# Patient Record
Sex: FEMALE | Race: WHITE | Employment: OTHER | URBAN - METROPOLITAN AREA
[De-identification: names, ages, dates, MRNs, and addresses within clinical notes are randomized per-mention and may not be internally consistent; named-entity substitution may affect disease eponyms.]

---

## 2017-02-08 PROBLEM — J45.20 MILD INTERMITTENT ASTHMA WITHOUT COMPLICATION: Status: ACTIVE | Noted: 2017-02-08

## 2017-06-25 PROBLEM — S06.0X1A CONCUSSION WITH LOSS OF CONSCIOUSNESS OF 30 MINUTES OR LESS: Status: ACTIVE | Noted: 2017-06-25

## 2017-06-25 PROBLEM — S09.90XA HEAD INJURY: Status: ACTIVE | Noted: 2017-06-25

## 2017-06-25 PROBLEM — F41.9 ANXIETY: Status: ACTIVE | Noted: 2017-06-25

## 2017-08-25 PROBLEM — B02.9 HERPES ZOSTER WITHOUT COMPLICATION: Status: ACTIVE | Noted: 2017-08-25

## 2017-09-29 PROBLEM — E06.3 HASHIMOTO'S THYROIDITIS: Status: ACTIVE | Noted: 2017-09-29

## 2018-04-30 ENCOUNTER — HOSPITAL ENCOUNTER (OUTPATIENT)
Age: 28
Discharge: HOME | End: 2018-04-30
Payer: COMMERCIAL

## 2018-04-30 LAB
ALBUMIN SERPL-MCNC: 4.4 G/DL (ref 3.5–5.2)
ALP BLD-CCNC: 49 U/L (ref 35–104)
ALT SERPL-CCNC: 21 U/L (ref 0–32)
ANION GAP SERPL CALCULATED.3IONS-SCNC: 14 MMOL/L (ref 7–16)
AST SERPL-CCNC: 22 U/L (ref 0–31)
BASOPHILS ABSOLUTE: 0.07 E9/L (ref 0–0.2)
BASOPHILS RELATIVE PERCENT: 0.9 % (ref 0–2)
BILIRUB SERPL-MCNC: 0.5 MG/DL (ref 0–1.2)
BUN BLDV-MCNC: 13 MG/DL (ref 6–20)
CALCIUM SERPL-MCNC: 9.4 MG/DL (ref 8.6–10.2)
CHLORIDE BLD-SCNC: 103 MMOL/L (ref 98–107)
CHOLESTEROL, TOTAL: 155 MG/DL (ref 0–199)
CO2: 18 MMOL/L (ref 22–29)
CREAT SERPL-MCNC: 0.8 MG/DL (ref 0.5–1)
EOSINOPHILS ABSOLUTE: 0.25 E9/L (ref 0.05–0.5)
EOSINOPHILS RELATIVE PERCENT: 3.1 % (ref 0–6)
GFR AFRICAN AMERICAN: >60
GFR NON-AFRICAN AMERICAN: >60 ML/MIN/1.73
GLUCOSE BLD-MCNC: 92 MG/DL (ref 74–109)
HBA1C MFR BLD: 5.2 % (ref 4.8–5.9)
HCT VFR BLD CALC: 44.2 % (ref 34–48)
HDLC SERPL-MCNC: 51 MG/DL
HEMOGLOBIN: 14.9 G/DL (ref 11.5–15.5)
IMMATURE GRANULOCYTES #: 0.04 E9/L
IMMATURE GRANULOCYTES %: 0.5 % (ref 0–5)
LDL CHOLESTEROL CALCULATED: 69 MG/DL (ref 0–99)
LYMPHOCYTES ABSOLUTE: 2.16 E9/L (ref 1.5–4)
LYMPHOCYTES RELATIVE PERCENT: 26.8 % (ref 20–42)
MCH RBC QN AUTO: 31.6 PG (ref 26–35)
MCHC RBC AUTO-ENTMCNC: 33.7 % (ref 32–34.5)
MCV RBC AUTO: 93.8 FL (ref 80–99.9)
MONOCYTES ABSOLUTE: 0.56 E9/L (ref 0.1–0.95)
MONOCYTES RELATIVE PERCENT: 6.9 % (ref 2–12)
NEUTROPHILS ABSOLUTE: 4.99 E9/L (ref 1.8–7.3)
NEUTROPHILS RELATIVE PERCENT: 61.8 % (ref 43–80)
PDW BLD-RTO: 12.3 FL (ref 11.5–15)
PLATELET # BLD: 307 E9/L (ref 130–450)
PMV BLD AUTO: 10 FL (ref 7–12)
POTASSIUM SERPL-SCNC: 4.2 MMOL/L (ref 3.5–5)
RBC # BLD: 4.71 E12/L (ref 3.5–5.5)
SODIUM BLD-SCNC: 135 MMOL/L (ref 132–146)
TOTAL PROTEIN: 7.7 G/DL (ref 6.4–8.3)
TRIGL SERPL-MCNC: 176 MG/DL (ref 0–149)
TSH SERPL DL<=0.05 MIU/L-ACNC: 2.01 UIU/ML (ref 0.27–4.2)
VITAMIN D 25-HYDROXY: 57 NG/ML (ref 30–100)
VLDLC SERPL CALC-MCNC: 35 MG/DL
WBC # BLD: 8.1 E9/L (ref 4.5–11.5)

## 2018-04-30 PROCEDURE — 80061 LIPID PANEL: CPT

## 2018-04-30 PROCEDURE — 85025 COMPLETE CBC W/AUTO DIFF WBC: CPT

## 2018-04-30 PROCEDURE — 84443 ASSAY THYROID STIM HORMONE: CPT

## 2018-04-30 PROCEDURE — 36415 COLL VENOUS BLD VENIPUNCTURE: CPT

## 2018-04-30 PROCEDURE — 83036 HEMOGLOBIN GLYCOSYLATED A1C: CPT

## 2018-04-30 PROCEDURE — 82306 VITAMIN D 25 HYDROXY: CPT

## 2018-04-30 PROCEDURE — 80053 COMPREHEN METABOLIC PANEL: CPT

## 2018-05-01 ENCOUNTER — OFFICE VISIT (OUTPATIENT)
Dept: FAMILY MEDICINE CLINIC | Age: 28
End: 2018-05-01
Payer: COMMERCIAL

## 2018-05-01 ENCOUNTER — TELEPHONE (OUTPATIENT)
Dept: PHYSICAL MEDICINE AND REHAB | Age: 28
End: 2018-05-01

## 2018-05-01 VITALS
RESPIRATION RATE: 18 BRPM | HEIGHT: 66 IN | WEIGHT: 197.4 LBS | DIASTOLIC BLOOD PRESSURE: 78 MMHG | BODY MASS INDEX: 31.72 KG/M2 | HEART RATE: 77 BPM | SYSTOLIC BLOOD PRESSURE: 118 MMHG | OXYGEN SATURATION: 97 %

## 2018-05-01 DIAGNOSIS — J45.20 MILD INTERMITTENT ASTHMA WITHOUT COMPLICATION: ICD-10-CM

## 2018-05-01 DIAGNOSIS — I87.009 POST-PHLEBITIC SYNDROME: ICD-10-CM

## 2018-05-01 DIAGNOSIS — I63.50 CEREBRAL ARTERY OCCLUSION WITH CEREBRAL INFARCTION (HCC): Chronic | ICD-10-CM

## 2018-05-01 DIAGNOSIS — E78.49 OTHER HYPERLIPIDEMIA: ICD-10-CM

## 2018-05-01 DIAGNOSIS — M54.32 SCIATICA OF LEFT SIDE: ICD-10-CM

## 2018-05-01 DIAGNOSIS — G90.A POTS (POSTURAL ORTHOSTATIC TACHYCARDIA SYNDROME): ICD-10-CM

## 2018-05-01 DIAGNOSIS — D68.2 FACTOR V DEFICIENCY (HCC): ICD-10-CM

## 2018-05-01 DIAGNOSIS — G81.90 HEMIPARESIS, UNSPECIFIED HEMIPARESIS ETIOLOGY, UNSPECIFIED LATERALITY (HCC): Primary | ICD-10-CM

## 2018-05-01 PROCEDURE — G8417 CALC BMI ABV UP PARAM F/U: HCPCS | Performed by: FAMILY MEDICINE

## 2018-05-01 PROCEDURE — 1036F TOBACCO NON-USER: CPT | Performed by: FAMILY MEDICINE

## 2018-05-01 PROCEDURE — G8598 ASA/ANTIPLAT THER USED: HCPCS | Performed by: FAMILY MEDICINE

## 2018-05-01 PROCEDURE — 99214 OFFICE O/P EST MOD 30 MIN: CPT | Performed by: FAMILY MEDICINE

## 2018-05-01 PROCEDURE — G8427 DOCREV CUR MEDS BY ELIG CLIN: HCPCS | Performed by: FAMILY MEDICINE

## 2018-05-01 ASSESSMENT — PATIENT HEALTH QUESTIONNAIRE - PHQ9
1. LITTLE INTEREST OR PLEASURE IN DOING THINGS: 1
SUM OF ALL RESPONSES TO PHQ QUESTIONS 1-9: 2
SUM OF ALL RESPONSES TO PHQ9 QUESTIONS 1 & 2: 2
2. FEELING DOWN, DEPRESSED OR HOPELESS: 1

## 2018-05-01 ASSESSMENT — ENCOUNTER SYMPTOMS
BACK PAIN: 1
SINUS PAIN: 0
SHORTNESS OF BREATH: 0
EYE PAIN: 0
BLOOD IN STOOL: 0
CONSTIPATION: 0
VOMITING: 0
EYE REDNESS: 0
EYES NEGATIVE: 1
STRIDOR: 0
RESPIRATORY NEGATIVE: 1
BLURRED VISION: 0
COUGH: 0
DOUBLE VISION: 0
SPUTUM PRODUCTION: 0
ABDOMINAL PAIN: 0
HEMOPTYSIS: 0
SORE THROAT: 0
DIARRHEA: 0
EYE DISCHARGE: 0
PHOTOPHOBIA: 0
HEARTBURN: 0
WHEEZING: 0
NAUSEA: 0
GASTROINTESTINAL NEGATIVE: 1
ORTHOPNEA: 0

## 2018-05-09 ENCOUNTER — HOSPITAL ENCOUNTER (OUTPATIENT)
Dept: NEUROLOGY | Age: 28
Discharge: HOME | End: 2018-05-09
Payer: MEDICARE

## 2018-05-09 ENCOUNTER — TELEPHONE (OUTPATIENT)
Dept: FAMILY MEDICINE CLINIC | Age: 28
End: 2018-05-09

## 2018-05-09 ENCOUNTER — HOSPITAL ENCOUNTER (OUTPATIENT)
Dept: MRI IMAGING | Age: 28
Discharge: HOME | End: 2018-05-11
Payer: MEDICARE

## 2018-05-09 DIAGNOSIS — M54.32 SCIATICA OF LEFT SIDE: ICD-10-CM

## 2018-05-09 PROCEDURE — 95911 NRV CNDJ TEST 9-10 STUDIES: CPT | Performed by: PHYSICAL MEDICINE & REHABILITATION

## 2018-05-09 PROCEDURE — 72148 MRI LUMBAR SPINE W/O DYE: CPT

## 2018-05-09 PROCEDURE — 95870 NDL EMG LMTD STD MUSC 1 XTR: CPT

## 2018-05-09 PROCEDURE — 95886 MUSC TEST DONE W/N TEST COMP: CPT

## 2018-05-09 PROCEDURE — 95911 NRV CNDJ TEST 9-10 STUDIES: CPT

## 2018-05-09 PROCEDURE — 95886 MUSC TEST DONE W/N TEST COMP: CPT | Performed by: PHYSICAL MEDICINE & REHABILITATION

## 2018-05-16 ENCOUNTER — OFFICE VISIT (OUTPATIENT)
Dept: FAMILY MEDICINE CLINIC | Age: 28
End: 2018-05-16
Payer: MEDICARE

## 2018-05-16 VITALS
OXYGEN SATURATION: 98 % | TEMPERATURE: 98.5 F | WEIGHT: 196 LBS | DIASTOLIC BLOOD PRESSURE: 80 MMHG | HEIGHT: 66 IN | RESPIRATION RATE: 16 BRPM | HEART RATE: 66 BPM | SYSTOLIC BLOOD PRESSURE: 102 MMHG | BODY MASS INDEX: 31.5 KG/M2

## 2018-05-16 DIAGNOSIS — I63.50 CEREBRAL ARTERY OCCLUSION WITH CEREBRAL INFARCTION (HCC): Chronic | ICD-10-CM

## 2018-05-16 DIAGNOSIS — M54.16 LUMBAR RADICULOPATHY: Primary | ICD-10-CM

## 2018-05-16 DIAGNOSIS — G90.A POTS (POSTURAL ORTHOSTATIC TACHYCARDIA SYNDROME): ICD-10-CM

## 2018-05-16 DIAGNOSIS — D68.2 FACTOR V DEFICIENCY (HCC): ICD-10-CM

## 2018-05-16 DIAGNOSIS — M51.36 DDD (DEGENERATIVE DISC DISEASE), LUMBAR: ICD-10-CM

## 2018-05-16 PROCEDURE — G8417 CALC BMI ABV UP PARAM F/U: HCPCS | Performed by: FAMILY MEDICINE

## 2018-05-16 PROCEDURE — G8598 ASA/ANTIPLAT THER USED: HCPCS | Performed by: FAMILY MEDICINE

## 2018-05-16 PROCEDURE — 99214 OFFICE O/P EST MOD 30 MIN: CPT | Performed by: FAMILY MEDICINE

## 2018-05-16 PROCEDURE — 1036F TOBACCO NON-USER: CPT | Performed by: FAMILY MEDICINE

## 2018-05-16 PROCEDURE — G8427 DOCREV CUR MEDS BY ELIG CLIN: HCPCS | Performed by: FAMILY MEDICINE

## 2018-05-16 ASSESSMENT — ENCOUNTER SYMPTOMS
SORE THROAT: 0
DIARRHEA: 0
RESPIRATORY NEGATIVE: 1
EYE DISCHARGE: 0
ORTHOPNEA: 0
CONSTIPATION: 0
EYE REDNESS: 0
SHORTNESS OF BREATH: 0
EYE PAIN: 0
PHOTOPHOBIA: 0
SPUTUM PRODUCTION: 0
VOMITING: 0
GASTROINTESTINAL NEGATIVE: 1
COUGH: 0
BACK PAIN: 1
BLOOD IN STOOL: 0
DOUBLE VISION: 0
WHEEZING: 0
HEMOPTYSIS: 0
BLURRED VISION: 0
EYES NEGATIVE: 1
STRIDOR: 0
SINUS PAIN: 0
HEARTBURN: 0
ABDOMINAL PAIN: 0
NAUSEA: 0

## 2018-05-18 ASSESSMENT — ENCOUNTER SYMPTOMS: BOWEL INCONTINENCE: 0

## 2018-05-24 ENCOUNTER — OFFICE VISIT (OUTPATIENT)
Dept: PHYSICAL MEDICINE AND REHAB | Age: 28
End: 2018-05-24
Payer: MEDICARE

## 2018-05-24 VITALS
HEART RATE: 82 BPM | WEIGHT: 190 LBS | DIASTOLIC BLOOD PRESSURE: 57 MMHG | HEIGHT: 66 IN | SYSTOLIC BLOOD PRESSURE: 101 MMHG | BODY MASS INDEX: 30.53 KG/M2

## 2018-05-24 DIAGNOSIS — R20.0 NUMBNESS AND TINGLING: ICD-10-CM

## 2018-05-24 DIAGNOSIS — R20.2 NUMBNESS AND TINGLING: ICD-10-CM

## 2018-05-24 DIAGNOSIS — I63.50 CEREBRAL ARTERY OCCLUSION WITH CEREBRAL INFARCTION (HCC): Chronic | ICD-10-CM

## 2018-05-24 DIAGNOSIS — G81.94 LEFT HEMIPARESIS (HCC): ICD-10-CM

## 2018-05-24 DIAGNOSIS — M21.372 LEFT FOOT DROP: ICD-10-CM

## 2018-05-24 DIAGNOSIS — M54.16 LUMBAR RADICULITIS: Primary | ICD-10-CM

## 2018-05-24 PROCEDURE — G8417 CALC BMI ABV UP PARAM F/U: HCPCS | Performed by: PHYSICAL MEDICINE & REHABILITATION

## 2018-05-24 PROCEDURE — 1036F TOBACCO NON-USER: CPT | Performed by: PHYSICAL MEDICINE & REHABILITATION

## 2018-05-24 PROCEDURE — 99204 OFFICE O/P NEW MOD 45 MIN: CPT | Performed by: PHYSICAL MEDICINE & REHABILITATION

## 2018-05-24 PROCEDURE — G8598 ASA/ANTIPLAT THER USED: HCPCS | Performed by: PHYSICAL MEDICINE & REHABILITATION

## 2018-05-24 PROCEDURE — G8427 DOCREV CUR MEDS BY ELIG CLIN: HCPCS | Performed by: PHYSICAL MEDICINE & REHABILITATION

## 2018-05-28 ENCOUNTER — TELEPHONE (OUTPATIENT)
Dept: NEUROSURGERY | Age: 28
End: 2018-05-28

## 2018-06-26 ENCOUNTER — APPOINTMENT (OUTPATIENT)
Dept: CT IMAGING | Age: 28
End: 2018-06-26
Payer: MEDICARE

## 2018-06-26 ENCOUNTER — HOSPITAL ENCOUNTER (EMERGENCY)
Age: 28
Discharge: HOME | End: 2018-06-27
Attending: EMERGENCY MEDICINE
Payer: MEDICARE

## 2018-06-26 ENCOUNTER — APPOINTMENT (OUTPATIENT)
Dept: GENERAL RADIOLOGY | Age: 28
End: 2018-06-26
Payer: MEDICARE

## 2018-06-26 VITALS
WEIGHT: 185 LBS | RESPIRATION RATE: 18 BRPM | TEMPERATURE: 98.6 F | BODY MASS INDEX: 29.86 KG/M2 | SYSTOLIC BLOOD PRESSURE: 109 MMHG | HEART RATE: 68 BPM | OXYGEN SATURATION: 98 % | DIASTOLIC BLOOD PRESSURE: 65 MMHG

## 2018-06-26 DIAGNOSIS — R10.84 GENERALIZED ABDOMINAL PAIN: Primary | ICD-10-CM
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
## 2018-06-26 LAB
ALBUMIN SERPL-MCNC: 4.1 G/DL (ref 3.5–5.2)
ALP BLD-CCNC: 54 U/L (ref 35–104)
ALT SERPL-CCNC: 16 U/L (ref 0–32)
ANION GAP SERPL CALCULATED.3IONS-SCNC: 14 MMOL/L (ref 7–16)
AST SERPL-CCNC: 18 U/L (ref 0–31)
BACTERIA: ABNORMAL /HPF
BASOPHILS ABSOLUTE: 0.09 E9/L (ref 0–0.2)
BASOPHILS RELATIVE PERCENT: 0.9 % (ref 0–2)
BILIRUB SERPL-MCNC: <0.2 MG/DL (ref 0–1.2)
BILIRUBIN URINE: NEGATIVE
BLOOD, URINE: ABNORMAL
BUN BLDV-MCNC: 12 MG/DL (ref 6–20)
CALCIUM SERPL-MCNC: 9.8 MG/DL (ref 8.6–10.2)
CHLORIDE BLD-SCNC: 105 MMOL/L (ref 98–107)
CHP ED QC CHECK: YES
CLARITY: ABNORMAL
CO2: 21 MMOL/L (ref 22–29)
COLOR: YELLOW
CREAT SERPL-MCNC: 1 MG/DL (ref 0.5–1)
EKG ATRIAL RATE: 57 BPM
EKG P AXIS: 13 DEGREES
EKG P-R INTERVAL: 142 MS
EKG Q-T INTERVAL: 390 MS
EKG QRS DURATION: 62 MS
EKG QTC CALCULATION (BAZETT): 379 MS
EKG R AXIS: 59 DEGREES
EKG T AXIS: 50 DEGREES
EKG VENTRICULAR RATE: 57 BPM
EOSINOPHILS ABSOLUTE: 0.29 E9/L (ref 0.05–0.5)
EOSINOPHILS RELATIVE PERCENT: 3 % (ref 0–6)
EPITHELIAL CELLS, UA: ABNORMAL /HPF
GFR AFRICAN AMERICAN: >60
GFR NON-AFRICAN AMERICAN: >60 ML/MIN/1.73
GLUCOSE BLD-MCNC: 87 MG/DL (ref 74–109)
GLUCOSE URINE: NEGATIVE MG/DL
HCT VFR BLD CALC: 40.1 % (ref 34–48)
HEMOGLOBIN: 13.9 G/DL (ref 11.5–15.5)
IMMATURE GRANULOCYTES #: 0.04 E9/L
IMMATURE GRANULOCYTES %: 0.4 % (ref 0–5)
KETONES, URINE: ABNORMAL MG/DL
LACTIC ACID: 1.3 MMOL/L (ref 0.5–2.2)
LEUKOCYTE ESTERASE, URINE: ABNORMAL
LIPASE: 32 U/L (ref 13–60)
LYMPHOCYTES ABSOLUTE: 2.45 E9/L (ref 1.5–4)
LYMPHOCYTES RELATIVE PERCENT: 25.5 % (ref 20–42)
MCH RBC QN AUTO: 31.9 PG (ref 26–35)
MCHC RBC AUTO-ENTMCNC: 34.7 % (ref 32–34.5)
MCV RBC AUTO: 92 FL (ref 80–99.9)
MONOCYTES ABSOLUTE: 0.96 E9/L (ref 0.1–0.95)
MONOCYTES RELATIVE PERCENT: 10 % (ref 2–12)
NEUTROPHILS ABSOLUTE: 5.79 E9/L (ref 1.8–7.3)
NEUTROPHILS RELATIVE PERCENT: 60.2 % (ref 43–80)
NITRITE, URINE: NEGATIVE
PDW BLD-RTO: 12.2 FL (ref 11.5–15)
PH UA: 6 (ref 5–9)
PLATELET # BLD: 341 E9/L (ref 130–450)
PMV BLD AUTO: 10 FL (ref 7–12)
POTASSIUM SERPL-SCNC: 4.2 MMOL/L (ref 3.5–5)
PREGNANCY TEST URINE, POC: NEGATIVE
PROTEIN UA: NEGATIVE MG/DL
RBC # BLD: 4.36 E12/L (ref 3.5–5.5)
RBC UA: ABNORMAL /HPF (ref 0–2)
SODIUM BLD-SCNC: 140 MMOL/L (ref 132–146)
SPECIFIC GRAVITY UA: 1.02 (ref 1–1.03)
TOTAL PROTEIN: 7.4 G/DL (ref 6.4–8.3)
TROPONIN: <0.01 NG/ML (ref 0–0.03)
UROBILINOGEN, URINE: 0.2 E.U./DL
WBC # BLD: 9.6 E9/L (ref 4.5–11.5)
WBC UA: ABNORMAL /HPF (ref 0–5)

## 2018-06-26 PROCEDURE — 2580000003 HC RX 258: Performed by: EMERGENCY MEDICINE

## 2018-06-26 PROCEDURE — 83605 ASSAY OF LACTIC ACID: CPT

## 2018-06-26 PROCEDURE — 84484 ASSAY OF TROPONIN QUANT: CPT

## 2018-06-26 PROCEDURE — 6360000004 HC RX CONTRAST MEDICATION: Performed by: RADIOLOGY

## 2018-06-26 PROCEDURE — 93005 ELECTROCARDIOGRAM TRACING: CPT | Performed by: EMERGENCY MEDICINE

## 2018-06-26 PROCEDURE — 72170 X-RAY EXAM OF PELVIS: CPT

## 2018-06-26 PROCEDURE — 71260 CT THORAX DX C+: CPT

## 2018-06-26 PROCEDURE — 81001 URINALYSIS AUTO W/SCOPE: CPT

## 2018-06-26 PROCEDURE — 6360000002 HC RX W HCPCS: Performed by: EMERGENCY MEDICINE

## 2018-06-26 PROCEDURE — 96374 THER/PROPH/DIAG INJ IV PUSH: CPT

## 2018-06-26 PROCEDURE — 72100 X-RAY EXAM L-S SPINE 2/3 VWS: CPT

## 2018-06-26 PROCEDURE — 74177 CT ABD & PELVIS W/CONTRAST: CPT

## 2018-06-26 PROCEDURE — 99285 EMERGENCY DEPT VISIT HI MDM: CPT

## 2018-06-26 PROCEDURE — 80053 COMPREHEN METABOLIC PANEL: CPT

## 2018-06-26 PROCEDURE — 72074 X-RAY EXAM THORAC SPINE4/>VW: CPT

## 2018-06-26 PROCEDURE — 83690 ASSAY OF LIPASE: CPT

## 2018-06-26 PROCEDURE — 70450 CT HEAD/BRAIN W/O DYE: CPT

## 2018-06-26 PROCEDURE — 85025 COMPLETE CBC W/AUTO DIFF WBC: CPT

## 2018-06-26 PROCEDURE — 36415 COLL VENOUS BLD VENIPUNCTURE: CPT

## 2018-06-26 PROCEDURE — 94760 N-INVAS EAR/PLS OXIMETRY 1: CPT

## 2018-06-26 RX ORDER — KETOROLAC TROMETHAMINE 30 MG/ML
30 INJECTION, SOLUTION INTRAMUSCULAR; INTRAVENOUS ONCE
Status: DISCONTINUED | OUTPATIENT
Start: 2018-06-27 | End: 2018-06-27

## 2018-06-26 RX ORDER — ONDANSETRON 2 MG/ML
4 INJECTION INTRAMUSCULAR; INTRAVENOUS ONCE
Status: COMPLETED | OUTPATIENT
Start: 2018-06-26 | End: 2018-06-26

## 2018-06-26 RX ORDER — IBUPROFEN 800 MG/1
800 TABLET ORAL EVERY 8 HOURS PRN
Qty: 21 TABLET | Refills: 0 | Status: SHIPPED | OUTPATIENT
Start: 2018-06-26 | End: 2018-06-27 | Stop reason: ALTCHOICE

## 2018-06-26 RX ORDER — SODIUM CHLORIDE 9 MG/ML
1000 INJECTION, SOLUTION INTRAVENOUS ONCE
Status: COMPLETED | OUTPATIENT
Start: 2018-06-26 | End: 2018-06-27

## 2018-06-26 RX ADMIN — IOPAMIDOL 80 ML: 755 INJECTION, SOLUTION INTRAVENOUS at 23:14

## 2018-06-26 RX ADMIN — ONDANSETRON 4 MG: 2 INJECTION INTRAMUSCULAR; INTRAVENOUS at 21:43

## 2018-06-26 RX ADMIN — SODIUM CHLORIDE 1000 ML: 9 INJECTION, SOLUTION INTRAVENOUS at 21:43

## 2018-06-26 ASSESSMENT — ENCOUNTER SYMPTOMS
ABDOMINAL PAIN: 1
EYE PAIN: 0
SINUS PRESSURE: 0
BACK PAIN: 1
NAUSEA: 0
EYE REDNESS: 0
VOMITING: 0
DIARRHEA: 0
EYE DISCHARGE: 0
SHORTNESS OF BREATH: 0
COUGH: 0
SORE THROAT: 0
WHEEZING: 0
ABDOMINAL DISTENTION: 0

## 2018-06-26 ASSESSMENT — PAIN DESCRIPTION - PAIN TYPE: TYPE: ACUTE PAIN

## 2018-06-26 ASSESSMENT — PAIN DESCRIPTION - LOCATION: LOCATION: HIP;HEAD

## 2018-06-26 ASSESSMENT — PAIN SCALES - GENERAL: PAINLEVEL_OUTOF10: 7

## 2018-06-27 NOTE — ED PROVIDER NOTES
0.00 - 0.03 ng/mL   Microscopic Urinalysis   Result Value Ref Range    WBC, UA 5-10 0 - 5 /HPF    RBC, UA 1-3 0 - 2 /HPF    Epi Cells MANY /HPF    Bacteria, UA MODERATE (A) /HPF   POC Pregnancy Urine Qual   Result Value Ref Range    Preg Test, Ur negative     QC OK? yes    EKG 12 Lead   Result Value Ref Range    Ventricular Rate 57 BPM    Atrial Rate 57 BPM    P-R Interval 142 ms    QRS Duration 62 ms    Q-T Interval 390 ms    QTc Calculation (Bazett) 379 ms    P Axis 13 degrees    R Axis 59 degrees    T Axis 50 degrees     EKG:  Sinus bradycardia with ventricular rate of 57. SD interval, QRS duration and QT interval within normal range. Normal axis. Nonspecific T wave changes. No previous EKG. Radiology:  CT Chest W Contrast   Final Result         CHEST: No acute injury. Abdomen and pelvis: No acute injury            CT ABDOMEN PELVIS W IV CONTRAST Additional Contrast? None   Final Result         CHEST: No acute injury. Abdomen and pelvis: No acute injury            CT Head WO Contrast   Final Result   No CT evidence of acute intracranial injury. XR LUMBAR SPINE (2-3 VIEWS)   Final Result   No focal abnormality. XR PELVIS (1-2 VIEWS)   Final Result   No focal abnormality. XR THORACIC SPINE (MIN 4 VIEWS)   Final Result   No fracture          ------------------------- NURSING NOTES AND VITALS REVIEWED ---------------------------  Date / Time Roomed:  6/26/2018  7:34 PM  ED Bed Assignment:  [id number]    The nursing notes within the ED encounter and vital signs as below have been reviewed.    /65   Pulse 68   Temp 98.6 °F (37 °C) (Oral)   Resp 18   Wt 185 lb (83.9 kg)   LMP 06/01/2018   SpO2 98%   BMI 29.86 kg/m²   Oxygen Saturation Interpretation: Normal      ------------------------------------------ PROGRESS NOTES ------------------------------------------  I have spoken with the patient and discussed todays results, in addition to providing specific details for

## 2018-07-31 RX ORDER — CLONAZEPAM 1 MG/1
0.25 TABLET ORAL DAILY
COMMUNITY
End: 2021-09-02

## 2018-08-01 NOTE — H&P
[place], [address] [facility]  [address] [facility] [facility] [place]  Phone: [phone]  Fax: [phone]    PCP: Irineo Jean DO  Date of visit: 8/2/2018    CC: low back pain       Taty Solis is a 32 y.o. female who presents today for epidural injection. Patient complains of low back pain and left leg numbness. No red flag symptoms. Consents to proceed with procedure. Allergies   Allergen Reactions    Latex     Latex Dermatitis    Prednisone     Prednisone Other (See Comments)     Becomes aggressive per mother    Prozac [Fluoxetine Hcl] Other (See Comments)     Suicidal thoughts       No current facility-administered medications for this encounter. Past Medical History:   Diagnosis Date    Anxiety     Asthma     Blood clot embolism during pregnancy, antepartum     [facility]erebral [facility] occlusion [facility] infarction ([facility]     DVT (deep vein thrombosis) in pregnancy ([facility] [address] [facility] 11/2015    DVT of leg (deep venous thrombosis) ([facility])     left    DVT of upper extremity (deep vein thrombosis) ([facility])     Left X 2    POTS (postural orthostatic tachycardia syndrome)     TIA (transient ischemic attack)        Past Surgical History:   Procedure Laterality Date    ARM SURGERY Right        Family History   Problem Relation Age of Onset    Deep Vein Thrombosis Maternal Grandmother             ROS:    Constitutional: Denies fevers, chills, night sweats, unintentional weight loss     Skin: Denies rash or skin changes     Respiratory: Denies SOB or cough     Cardiovascular: Denies CP, palpitations, edema      Neurologic: See HPI.     MSK: See HPI. Hematologic/Lymphatic/Immunologic: Denies bruising       Physical Exam:   Blood pressure 105/63, pulse 62, resp. rate 16, SpO2 99 %, not currently breastfeeding.    General: well developed and well nourished in no acute distress  Resp: symmetrical chest expansion, unlabored breathing, respirations unlabored. CV: Heart rate is regular. Peripheral pulses are palpable  Skin: No rashes or ecchymosis. Normal turgor. MSK: Palpatory exam revealed mild tenderness along lumbosacral paraspinals, midline spine.       Neurological Exam:  Strength:                     R          L  Hip Flex                       5          4  Knee Ext                     5          4  Ankle dorsi                  5          1  EHL                             5          3  Ankle Plantar               5          4     Sensory:  Decreased sensation LT diffusely but left L4 and L5 more so     Reflexes:                     R          L  Patellar                        (2+)      (2+)  Ankle Jerk                   (2+)      (2+)    Impression:     Lumbar radiculopathy      Plan:   · Injection as planned today           Lj Fish DO, FAAPMR   Board Certified Physical Medicine and Rehabilitation

## 2018-08-02 ENCOUNTER — HOSPITAL ENCOUNTER (OUTPATIENT)
Dept: OPERATING ROOM | Age: 28
Setting detail: OUTPATIENT SURGERY
Discharge: HOME | End: 2018-08-02
Attending: PHYSICAL MEDICINE & REHABILITATION
Payer: MEDICARE

## 2018-08-02 ENCOUNTER — HOSPITAL ENCOUNTER (OUTPATIENT)
Age: 28
Setting detail: OUTPATIENT SURGERY
Discharge: HOME | End: 2018-08-02
Attending: PHYSICAL MEDICINE & REHABILITATION | Admitting: PHYSICAL MEDICINE & REHABILITATION
Payer: MEDICARE

## 2018-08-02 VITALS
HEART RATE: 59 BPM | DIASTOLIC BLOOD PRESSURE: 62 MMHG | OXYGEN SATURATION: 98 % | RESPIRATION RATE: 16 BRPM | SYSTOLIC BLOOD PRESSURE: 112 MMHG

## 2018-08-02 DIAGNOSIS — M51.36 DDD (DEGENERATIVE DISC DISEASE), LUMBAR: ICD-10-CM

## 2018-08-02 PROCEDURE — 3209999900 FLUORO FOR SURGICAL PROCEDURES

## 2018-08-02 PROCEDURE — 2580000003 HC RX 258: Performed by: PHYSICAL MEDICINE & REHABILITATION

## 2018-08-02 PROCEDURE — 2709999900 HC NON-CHARGEABLE SUPPLY: Performed by: PHYSICAL MEDICINE & REHABILITATION

## 2018-08-02 PROCEDURE — 6360000002 HC RX W HCPCS: Performed by: PHYSICAL MEDICINE & REHABILITATION

## 2018-08-02 PROCEDURE — 7100000010 HC PHASE II RECOVERY - FIRST 15 MIN: Performed by: PHYSICAL MEDICINE & REHABILITATION

## 2018-08-02 PROCEDURE — 64483 NJX AA&/STRD TFRM EPI L/S 1: CPT | Performed by: PHYSICAL MEDICINE & REHABILITATION

## 2018-08-02 PROCEDURE — 2500000003 HC RX 250 WO HCPCS: Performed by: PHYSICAL MEDICINE & REHABILITATION

## 2018-08-02 PROCEDURE — 3600000005 HC SURGERY LEVEL 5 BASE: Performed by: PHYSICAL MEDICINE & REHABILITATION

## 2018-08-02 PROCEDURE — 6360000004 HC RX CONTRAST MEDICATION: Performed by: PHYSICAL MEDICINE & REHABILITATION

## 2018-08-02 PROCEDURE — 7100000011 HC PHASE II RECOVERY - ADDTL 15 MIN: Performed by: PHYSICAL MEDICINE & REHABILITATION

## 2018-08-02 RX ORDER — LIDOCAINE HYDROCHLORIDE 10 MG/ML
INJECTION, SOLUTION EPIDURAL; INFILTRATION; INTRACAUDAL; PERINEURAL PRN
Status: DISCONTINUED | OUTPATIENT
Start: 2018-08-02 | End: 2018-08-02 | Stop reason: HOSPADM

## 2018-08-02 ASSESSMENT — PAIN DESCRIPTION - DESCRIPTORS
DESCRIPTORS: ACHING
DESCRIPTORS: ACHING

## 2018-08-02 ASSESSMENT — PAIN DESCRIPTION - LOCATION
LOCATION: HIP
LOCATION: HIP

## 2018-08-02 ASSESSMENT — PAIN DESCRIPTION - PAIN TYPE
TYPE: SURGICAL PAIN;CHRONIC PAIN
TYPE: SURGICAL PAIN

## 2018-08-02 ASSESSMENT — PAIN DESCRIPTION - ORIENTATION
ORIENTATION: LEFT
ORIENTATION: LEFT

## 2018-08-02 ASSESSMENT — PAIN SCALES - GENERAL
PAINLEVEL_OUTOF10: 6
PAINLEVEL_OUTOF10: 4

## 2018-08-02 ASSESSMENT — PAIN - FUNCTIONAL ASSESSMENT: PAIN_FUNCTIONAL_ASSESSMENT: 0-10

## 2018-08-02 NOTE — LETTER
[facility]  [place] [address]  Phone: [phone]          August 2, 2018     Patient: Ann Stiles   YOB: 1990   Date of Visit: 6/27/2018       To Whom it May Concern:    Ann Stiles was seen at [facility] [facility] on 6/27/2018. Please excuse Brisa Hodge from work today, she was needed for her daughter. If you have any questions or concerns, please don't hesitate to call.     Sincerely,         Norman Mcguire, RN

## 2018-08-02 NOTE — OP NOTE
LUMBOSACRAL EPIDURAL STEROID INJECTION, TRANSFORAMINAL APPROACH       WITH FLUOROSCOPIC GUIDANCE    Patient: Taty Solis                         MRN#: [id number]  : 1990   Date of procedure: 2018      Physician Performing Procedure:  Christina Dobbins DO    Clinical Scenario: As per electronic documentation. Diagnosis: lumbar radiculopathy     Injectate: A total of 3cc, consisting of 1 cc of Dexamethasone 10mg/ml,  with the remainder of normal saline    Levels Treated:  Left L5-S1 neural foramen    Approach:   Transforaminal    Improvement after today's procedure: As per nursing record. Comments:  None     Pre-procedural evaluation: The patient was examined today just prior to performing the procedure listed above. The patient's heart rate was normal, lungs were clear to auscultation bilaterally. Procedure: The patient was prepped and draped in a sterile fashion in the prone position after informed consent was signed and all the patient's questions were answered including the risks, benefits, alternative treatment options, and prognosis. The risks include - but are not limited to - infection, allergic reaction, increased pain, lack of therapeutic benefit, steroid reaction, nerve damage, paralysis, stroke, epidural hematoma, syncope, headache, respiratory or cardiac arrest, and scar formation. The C-arm was positioned so that an oblique view of the neural foramen as noted above was visualized. The soft tissues overlying this structure were infiltrated with 2-3 cc. of 1% Lidocaine without Epinephrine. A 22 gauge 5 inch spinal needle was inserted toward the target using a trajectory view along the fluoroscope beam.  Under AP and lateral visualization, the needle was advanced so it did not puncture dura. Biplanar projections were used to confirm position. Aspiration was confirmed to be negative for CSF and/or blood.   A 1-2 cc. volume of Isovue contrast was injected at this

## 2019-01-21 ENCOUNTER — OFFICE VISIT (OUTPATIENT)
Dept: FAMILY MEDICINE CLINIC | Age: 29
End: 2019-01-21
Payer: MEDICARE

## 2019-01-21 VITALS
SYSTOLIC BLOOD PRESSURE: 120 MMHG | HEIGHT: 66 IN | OXYGEN SATURATION: 96 % | TEMPERATURE: 98.3 F | DIASTOLIC BLOOD PRESSURE: 78 MMHG | BODY MASS INDEX: 39.05 KG/M2 | HEART RATE: 94 BPM | RESPIRATION RATE: 18 BRPM | WEIGHT: 243 LBS

## 2019-01-21 DIAGNOSIS — R73.01 IFG (IMPAIRED FASTING GLUCOSE): ICD-10-CM

## 2019-01-21 DIAGNOSIS — R53.83 FATIGUE, UNSPECIFIED TYPE: ICD-10-CM

## 2019-01-21 DIAGNOSIS — Z23 IMMUNIZATION DUE: ICD-10-CM

## 2019-01-21 DIAGNOSIS — I10 ESSENTIAL HYPERTENSION: ICD-10-CM

## 2019-01-21 DIAGNOSIS — R79.89 LOW VITAMIN D LEVEL: ICD-10-CM

## 2019-01-21 DIAGNOSIS — E78.5 DYSLIPIDEMIA: ICD-10-CM

## 2019-01-21 DIAGNOSIS — I63.50 CEREBRAL ARTERY OCCLUSION WITH CEREBRAL INFARCTION (HCC): Chronic | ICD-10-CM

## 2019-01-21 DIAGNOSIS — D68.2 FACTOR V DEFICIENCY (HCC): ICD-10-CM

## 2019-01-21 DIAGNOSIS — G81.90 HEMIPARESIS, UNSPECIFIED HEMIPARESIS ETIOLOGY, UNSPECIFIED LATERALITY (HCC): Primary | ICD-10-CM

## 2019-01-21 PROCEDURE — G8482 FLU IMMUNIZE ORDER/ADMIN: HCPCS | Performed by: FAMILY MEDICINE

## 2019-01-21 PROCEDURE — G8417 CALC BMI ABV UP PARAM F/U: HCPCS | Performed by: FAMILY MEDICINE

## 2019-01-21 PROCEDURE — 90686 IIV4 VACC NO PRSV 0.5 ML IM: CPT | Performed by: FAMILY MEDICINE

## 2019-01-21 PROCEDURE — G8427 DOCREV CUR MEDS BY ELIG CLIN: HCPCS | Performed by: FAMILY MEDICINE

## 2019-01-21 PROCEDURE — 99214 OFFICE O/P EST MOD 30 MIN: CPT | Performed by: FAMILY MEDICINE

## 2019-01-21 PROCEDURE — 1036F TOBACCO NON-USER: CPT | Performed by: FAMILY MEDICINE

## 2019-01-21 PROCEDURE — G0008 ADMIN INFLUENZA VIRUS VAC: HCPCS | Performed by: FAMILY MEDICINE

## 2019-01-21 PROCEDURE — G8598 ASA/ANTIPLAT THER USED: HCPCS | Performed by: FAMILY MEDICINE

## 2019-01-21 RX ORDER — CHOLECALCIFEROL (VITAMIN D3) 125 MCG
CAPSULE ORAL
Qty: 90 TABLET | Refills: 1 | Status: SHIPPED | OUTPATIENT
Start: 2019-01-21 | End: 2019-04-23 | Stop reason: SDUPTHER

## 2019-01-21 RX ORDER — ZOLPIDEM TARTRATE 5 MG/1
5 TABLET ORAL NIGHTLY PRN
COMMUNITY
End: 2019-10-23 | Stop reason: ALTCHOICE

## 2019-01-21 RX ORDER — FOLIC ACID 1 MG/1
TABLET ORAL
Qty: 90 TABLET | Refills: 1 | Status: SHIPPED | OUTPATIENT
Start: 2019-01-21 | End: 2019-04-23 | Stop reason: SDUPTHER

## 2019-01-21 RX ORDER — METOPROLOL SUCCINATE 25 MG/1
12.5 TABLET, EXTENDED RELEASE ORAL DAILY
Qty: 45 TABLET | Refills: 1 | Status: SHIPPED | OUTPATIENT
Start: 2019-01-21 | End: 2019-04-23 | Stop reason: SDUPTHER

## 2019-01-21 RX ORDER — ATORVASTATIN CALCIUM 10 MG/1
TABLET, FILM COATED ORAL
Qty: 90 TABLET | Refills: 1 | Status: SHIPPED | OUTPATIENT
Start: 2019-01-21 | End: 2019-04-23 | Stop reason: SDUPTHER

## 2019-01-21 ASSESSMENT — ENCOUNTER SYMPTOMS
FACIAL SWELLING: 0
VOICE CHANGE: 0
BLURRED VISION: 0
TROUBLE SWALLOWING: 0
BLOOD IN STOOL: 0
EYE PAIN: 0
ANAL BLEEDING: 0
ABDOMINAL DISTENTION: 0
RECTAL PAIN: 0
EYE ITCHING: 0
EYE REDNESS: 0
SORE THROAT: 0
BACK PAIN: 1
ORTHOPNEA: 0
CONSTIPATION: 0
SINUS PAIN: 0
APNEA: 0
RHINORRHEA: 0
SHORTNESS OF BREATH: 0
PHOTOPHOBIA: 0
WHEEZING: 0
VOMITING: 0
EYE DISCHARGE: 0
COLOR CHANGE: 0
RESPIRATORY NEGATIVE: 1
CHOKING: 0
ABDOMINAL PAIN: 0
STRIDOR: 0
ALLERGIC/IMMUNOLOGIC NEGATIVE: 1
COUGH: 0
GASTROINTESTINAL NEGATIVE: 1
NAUSEA: 0
DIARRHEA: 0
BOWEL INCONTINENCE: 0
SINUS PRESSURE: 0
CHEST TIGHTNESS: 0

## 2019-04-18 ENCOUNTER — HOSPITAL ENCOUNTER (OUTPATIENT)
Age: 29
Discharge: HOME | End: 2019-04-18
Payer: MEDICARE

## 2019-04-18 DIAGNOSIS — E78.5 DYSLIPIDEMIA: ICD-10-CM

## 2019-04-18 DIAGNOSIS — G81.90 HEMIPARESIS, UNSPECIFIED HEMIPARESIS ETIOLOGY, UNSPECIFIED LATERALITY (HCC): ICD-10-CM

## 2019-04-18 DIAGNOSIS — R53.83 FATIGUE, UNSPECIFIED TYPE: ICD-10-CM

## 2019-04-18 DIAGNOSIS — D68.2 FACTOR V DEFICIENCY (HCC): ICD-10-CM

## 2019-04-18 DIAGNOSIS — R73.01 IFG (IMPAIRED FASTING GLUCOSE): ICD-10-CM

## 2019-04-18 LAB
ALBUMIN SERPL-MCNC: 4.3 G/DL (ref 3.5–5.2)
ALP BLD-CCNC: 66 U/L (ref 35–104)
ALT SERPL-CCNC: 17 U/L (ref 0–32)
ANION GAP SERPL CALCULATED.3IONS-SCNC: 11 MMOL/L (ref 7–16)
AST SERPL-CCNC: 19 U/L (ref 0–31)
BASOPHILS ABSOLUTE: 0.05 E9/L (ref 0–0.2)
BASOPHILS RELATIVE PERCENT: 0.8 % (ref 0–2)
BILIRUB SERPL-MCNC: 0.5 MG/DL (ref 0–1.2)
BUN BLDV-MCNC: 10 MG/DL (ref 6–20)
CALCIUM SERPL-MCNC: 9.5 MG/DL (ref 8.6–10.2)
CHLORIDE BLD-SCNC: 107 MMOL/L (ref 98–107)
CHOLESTEROL, TOTAL: 188 MG/DL (ref 0–199)
CO2: 19 MMOL/L (ref 22–29)
CREAT SERPL-MCNC: 0.9 MG/DL (ref 0.5–1)
EOSINOPHILS ABSOLUTE: 0.21 E9/L (ref 0.05–0.5)
EOSINOPHILS RELATIVE PERCENT: 3.5 % (ref 0–6)
GFR AFRICAN AMERICAN: >60
GFR NON-AFRICAN AMERICAN: >60 ML/MIN/1.73
GLUCOSE BLD-MCNC: 102 MG/DL (ref 74–99)
HBA1C MFR BLD: 5.4 % (ref 4–5.6)
HCT VFR BLD CALC: 40.6 % (ref 34–48)
HDLC SERPL-MCNC: 50 MG/DL
HEMOGLOBIN: 13.8 G/DL (ref 11.5–15.5)
IMMATURE GRANULOCYTES #: 0.02 E9/L
IMMATURE GRANULOCYTES %: 0.3 % (ref 0–5)
LDL CHOLESTEROL CALCULATED: 94 MG/DL (ref 0–99)
LYMPHOCYTES ABSOLUTE: 1.92 E9/L (ref 1.5–4)
LYMPHOCYTES RELATIVE PERCENT: 31.7 % (ref 20–42)
MCH RBC QN AUTO: 31.3 PG (ref 26–35)
MCHC RBC AUTO-ENTMCNC: 34 % (ref 32–34.5)
MCV RBC AUTO: 92.1 FL (ref 80–99.9)
MONOCYTES ABSOLUTE: 0.76 E9/L (ref 0.1–0.95)
MONOCYTES RELATIVE PERCENT: 12.6 % (ref 2–12)
NEUTROPHILS ABSOLUTE: 3.09 E9/L (ref 1.8–7.3)
NEUTROPHILS RELATIVE PERCENT: 51.1 % (ref 43–80)
PDW BLD-RTO: 12.1 FL (ref 11.5–15)
PLATELET # BLD: 278 E9/L (ref 130–450)
PMV BLD AUTO: 9.4 FL (ref 7–12)
POTASSIUM SERPL-SCNC: 4 MMOL/L (ref 3.5–5)
RBC # BLD: 4.41 E12/L (ref 3.5–5.5)
SODIUM BLD-SCNC: 137 MMOL/L (ref 132–146)
TOTAL PROTEIN: 7.8 G/DL (ref 6.4–8.3)
TRIGL SERPL-MCNC: 219 MG/DL (ref 0–149)
TSH SERPL DL<=0.05 MIU/L-ACNC: 4.77 UIU/ML (ref 0.27–4.2)
VLDLC SERPL CALC-MCNC: 44 MG/DL
WBC # BLD: 6.1 E9/L (ref 4.5–11.5)

## 2019-04-18 PROCEDURE — 80061 LIPID PANEL: CPT

## 2019-04-18 PROCEDURE — 84443 ASSAY THYROID STIM HORMONE: CPT

## 2019-04-18 PROCEDURE — 36415 COLL VENOUS BLD VENIPUNCTURE: CPT

## 2019-04-18 PROCEDURE — 85025 COMPLETE CBC W/AUTO DIFF WBC: CPT

## 2019-04-18 PROCEDURE — 83036 HEMOGLOBIN GLYCOSYLATED A1C: CPT

## 2019-04-18 PROCEDURE — 80053 COMPREHEN METABOLIC PANEL: CPT

## 2019-04-23 ENCOUNTER — OFFICE VISIT (OUTPATIENT)
Dept: FAMILY MEDICINE CLINIC | Age: 29
End: 2019-04-23
Payer: MEDICARE

## 2019-04-23 VITALS
SYSTOLIC BLOOD PRESSURE: 114 MMHG | RESPIRATION RATE: 18 BRPM | DIASTOLIC BLOOD PRESSURE: 62 MMHG | HEIGHT: 66 IN | HEART RATE: 62 BPM | TEMPERATURE: 97.7 F | BODY MASS INDEX: 38.54 KG/M2 | WEIGHT: 239.8 LBS | OXYGEN SATURATION: 98 %

## 2019-04-23 DIAGNOSIS — R79.89 LOW VITAMIN D LEVEL: ICD-10-CM

## 2019-04-23 DIAGNOSIS — I10 ESSENTIAL HYPERTENSION: ICD-10-CM

## 2019-04-23 DIAGNOSIS — I63.50 CEREBRAL ARTERY OCCLUSION WITH CEREBRAL INFARCTION (HCC): Chronic | ICD-10-CM

## 2019-04-23 DIAGNOSIS — D68.2 FACTOR V DEFICIENCY (HCC): ICD-10-CM

## 2019-04-23 DIAGNOSIS — J45.20 MILD INTERMITTENT ASTHMA WITHOUT COMPLICATION: Primary | ICD-10-CM

## 2019-04-23 DIAGNOSIS — G90.A POTS (POSTURAL ORTHOSTATIC TACHYCARDIA SYNDROME): ICD-10-CM

## 2019-04-23 DIAGNOSIS — E78.49 OTHER HYPERLIPIDEMIA: ICD-10-CM

## 2019-04-23 DIAGNOSIS — E78.5 DYSLIPIDEMIA: ICD-10-CM

## 2019-04-23 DIAGNOSIS — R05.9 COUGH: ICD-10-CM

## 2019-04-23 PROCEDURE — 99214 OFFICE O/P EST MOD 30 MIN: CPT | Performed by: FAMILY MEDICINE

## 2019-04-23 PROCEDURE — G8427 DOCREV CUR MEDS BY ELIG CLIN: HCPCS | Performed by: FAMILY MEDICINE

## 2019-04-23 PROCEDURE — 1036F TOBACCO NON-USER: CPT | Performed by: FAMILY MEDICINE

## 2019-04-23 PROCEDURE — G8417 CALC BMI ABV UP PARAM F/U: HCPCS | Performed by: FAMILY MEDICINE

## 2019-04-23 PROCEDURE — G8598 ASA/ANTIPLAT THER USED: HCPCS | Performed by: FAMILY MEDICINE

## 2019-04-23 PROCEDURE — G8510 SCR DEP NEG, NO PLAN REQD: HCPCS | Performed by: FAMILY MEDICINE

## 2019-04-23 RX ORDER — TOPIRAMATE 100 MG/1
100 TABLET, FILM COATED ORAL 2 TIMES DAILY
Refills: 11 | COMMUNITY
Start: 2019-03-27 | End: 2021-02-05 | Stop reason: SDUPTHER

## 2019-04-23 RX ORDER — METOPROLOL SUCCINATE 25 MG/1
12.5 TABLET, EXTENDED RELEASE ORAL DAILY
Qty: 45 TABLET | Refills: 1 | Status: SHIPPED | OUTPATIENT
Start: 2019-04-23 | End: 2019-10-23 | Stop reason: SDUPTHER

## 2019-04-23 RX ORDER — ALBUTEROL SULFATE 90 UG/1
2 AEROSOL, METERED RESPIRATORY (INHALATION) EVERY 6 HOURS PRN
Qty: 1 INHALER | Refills: 3 | Status: SHIPPED | OUTPATIENT
Start: 2019-04-23 | End: 2019-10-23 | Stop reason: SDUPTHER

## 2019-04-23 RX ORDER — CHOLECALCIFEROL (VITAMIN D3) 125 MCG
CAPSULE ORAL
Qty: 90 TABLET | Refills: 1 | Status: SHIPPED | OUTPATIENT
Start: 2019-04-23 | End: 2019-10-23 | Stop reason: SDUPTHER

## 2019-04-23 RX ORDER — ATORVASTATIN CALCIUM 10 MG/1
TABLET, FILM COATED ORAL
Qty: 90 TABLET | Refills: 1 | Status: SHIPPED | OUTPATIENT
Start: 2019-04-23 | End: 2019-10-23 | Stop reason: SDUPTHER

## 2019-04-23 RX ORDER — FOLIC ACID 1 MG/1
TABLET ORAL
Qty: 90 TABLET | Refills: 1 | Status: SHIPPED | OUTPATIENT
Start: 2019-04-23 | End: 2019-10-23 | Stop reason: SDUPTHER

## 2019-04-23 ASSESSMENT — ENCOUNTER SYMPTOMS
RHINORRHEA: 0
SHORTNESS OF BREATH: 0
ABDOMINAL PAIN: 0
NAUSEA: 0
SORE THROAT: 0
FACIAL SWELLING: 0
COLOR CHANGE: 0
EYE DISCHARGE: 0
BLOOD IN STOOL: 0
EYE PAIN: 0
TROUBLE SWALLOWING: 0
VOMITING: 0
COUGH: 0
BACK PAIN: 1
RECTAL PAIN: 0
APNEA: 0
ORTHOPNEA: 0
BOWEL INCONTINENCE: 0
DIARRHEA: 0
STRIDOR: 0
VOICE CHANGE: 0
ABDOMINAL DISTENTION: 0
CHEST TIGHTNESS: 0
EYE ITCHING: 0
CHOKING: 0
BLURRED VISION: 0
ANAL BLEEDING: 0
PHOTOPHOBIA: 0
RESPIRATORY NEGATIVE: 1
SINUS PRESSURE: 0
EYE REDNESS: 0
SINUS PAIN: 0
GASTROINTESTINAL NEGATIVE: 1
CONSTIPATION: 0
ALLERGIC/IMMUNOLOGIC NEGATIVE: 1
WHEEZING: 0

## 2019-04-23 ASSESSMENT — PATIENT HEALTH QUESTIONNAIRE - PHQ9
1. LITTLE INTEREST OR PLEASURE IN DOING THINGS: 0
SUM OF ALL RESPONSES TO PHQ9 QUESTIONS 1 & 2: 0
SUM OF ALL RESPONSES TO PHQ QUESTIONS 1-9: 0
2. FEELING DOWN, DEPRESSED OR HOPELESS: 0
SUM OF ALL RESPONSES TO PHQ QUESTIONS 1-9: 0

## 2019-04-23 NOTE — PROGRESS NOTES
Ashley Kahtleen is a 29 y.o. female. HPI/Chief C/O:  Chief Complaint   Patient presents with    Discuss Labs     Pt here today to discuss labs done on 4/18    Medication Refill     Pt here today for medication refills- meds pended     Allergies   Allergen Reactions    Latex     Latex Dermatitis    Prednisone     Prednisone Other (See Comments)     Becomes aggressive per mother    Prozac [Fluoxetine Hcl] Other (See Comments)     Suicidal thoughts     The patient is here for a medication list and treatment planning review  We will go over our care planning goals as well as take care of all refills  We will set up labs as well     Hypertension   This is a chronic problem. The current episode started more than 1 year ago. The problem is controlled. Associated symptoms include anxiety, malaise/fatigue and neck pain. Pertinent negatives include no blurred vision, chest pain, headaches, orthopnea, palpitations, peripheral edema, PND, shortness of breath or sweats. Risk factors for coronary artery disease include family history. Past treatments include lifestyle changes and beta blockers. The current treatment provides significant improvement. Compliance problems include exercise, diet and psychosocial issues. Hypertensive end-organ damage includes CVA. There is no history of angina, kidney disease, CAD/MI, heart failure, left ventricular hypertrophy, PVD (H/O DVT lower extremity ) or retinopathy. Identifiable causes of hypertension include a thyroid problem. There is no history of chronic renal disease, coarctation of the aorta, hyperaldosteronism, hypercortisolism, hyperparathyroidism, a hypertension causing med, pheochromocytoma, renovascular disease or sleep apnea. Back Pain   This is a chronic problem. The current episode started more than 1 month ago. The problem occurs constantly. The problem has been gradually worsening since onset. The pain is present in the lumbar spine.  The quality of the pain is described as shooting and stabbing. The pain is at a severity of 8/10. The pain is moderate. The pain is the same all the time. Stiffness is present all day. Associated symptoms include leg pain, tingling and weakness. Pertinent negatives include no abdominal pain, bladder incontinence, bowel incontinence, chest pain, dysuria, fever, headaches, numbness, paresis, paresthesias, pelvic pain, perianal numbness or weight loss. ROS:  Review of Systems   Constitutional: Positive for fatigue and malaise/fatigue. Negative for activity change, appetite change, chills, diaphoresis, fever, unexpected weight change and weight loss. HENT: Negative. Negative for congestion, dental problem, drooling, ear discharge, ear pain, facial swelling, hearing loss, mouth sores, nosebleeds, postnasal drip, rhinorrhea, sinus pressure, sinus pain, sneezing, sore throat, tinnitus, trouble swallowing and voice change. Eyes: Negative for blurred vision, photophobia, pain, discharge, redness, itching and visual disturbance. Respiratory: Negative. Negative for apnea, cough, choking, chest tightness, shortness of breath, wheezing and stridor. Cardiovascular: Negative. Negative for chest pain, palpitations, orthopnea, leg swelling and PND. Gastrointestinal: Negative. Negative for abdominal distention, abdominal pain, anal bleeding, blood in stool, bowel incontinence, constipation, diarrhea, nausea, rectal pain and vomiting. Endocrine: Negative. Negative for cold intolerance, heat intolerance, polydipsia, polyphagia and polyuria. Genitourinary: Negative. Negative for bladder incontinence, decreased urine volume, difficulty urinating, dyspareunia, dysuria, enuresis, flank pain, frequency, genital sores, hematuria, menstrual problem, pelvic pain, urgency, vaginal bleeding, vaginal discharge and vaginal pain. Musculoskeletal: Positive for arthralgias, back pain, gait problem, myalgias and neck pain.  Negative for joint swelling and neck stiffness. Skin: Negative. Negative for color change, pallor, rash and wound. Allergic/Immunologic: Negative. Negative for environmental allergies, food allergies and immunocompromised state. Neurological: Positive for tingling and weakness. Negative for dizziness, tremors, seizures, syncope, facial asymmetry, speech difficulty, light-headedness, numbness, headaches and paresthesias. Hematological: Negative for adenopathy. Bruises/bleeds easily. Psychiatric/Behavioral: Positive for decreased concentration and dysphoric mood. Negative for agitation, behavioral problems, confusion, hallucinations, self-injury, sleep disturbance and suicidal ideas. The patient is nervous/anxious. The patient is not hyperactive.          Past Medical/Surgical Hx;  Reviewed with patient      Diagnosis Date    Anxiety     Asthma     Blood clot embolism during pregnancy, antepartum     [facility] occlusion [facility] ([facility]     DVT (deep vein thrombosis) in pregnancy ([facility] [address] 11/2015    DVT of leg (deep venous thrombosis) ([facility])     left    DVT of upper extremity (deep vein thrombosis) ([facility])     Left X 2    POTS (postural orthostatic tachycardia syndrome)     TIA (transient ischemic attack)      Past Surgical History:   Procedure Laterality Date    ARM SURGERY Right     NERVE BLOCK Left 08/02/2018    transforaminal epidural #1    MI ZEHRA NOSE/CLEFT LIP/TIP Left 8/2/2018    LEFT L5-S1 TRANSFORAMINAL EPIDURAL STEROID INJECTION #1 performed by Anitha Chapin DO at [address] [place] [address]       Past Family Hx:  Reviewed with patient      Problem Relation Age of Onset    Deep Vein Thrombosis Maternal Grandmother        Social Hx:  Reviewed with patient  Social History     Tobacco Use    Smoking status: Former Smoker     Types: Cigarettes    Smokeless tobacco: Never Used   Substance Use Topics    Alcohol use: No     Alcohol/week: 0.0 oz       OBJECTIVE  /62   Pulse 62 medication refill. Diagnoses and all orders for this visit:    Mild intermittent asthma without complication  --PLAN--aerosol accuneb 1.25 plus chest percussion--Rx      Dyslipidemia  -     atorvastatin (LIPITOR) 10 MG tablet; TAKE ONE TABLET BY MOUTH ONCE DAILY  --Mediterranean diet, exercise, weight loss, vitamins    We have a long talk on cholesterol and importance of lowering it       Essential hypertension ---controlled   ---CARDIAC---ELIQUIS, ace, BETA, STATIN, hctz, ( ccb )  --patient is instructed on low to moderate sodium ( 2 to 2.5 grams ), daily    Also to increase potassium in the diet to about 3.5 grams daily    Literature is provided     -     metoprolol succinate (TOPROL XL) 25 MG extended release tablet; Take 0.5 tablets by mouth daily    Low vitamin D level  -     Cholecalciferol (VITAMIN D3) 2000 units TABS; TAKE 1 TABLET BY MOUTH ONCE DAILY    Cerebral artery occlusion with cerebral infarction (HCC)  -     folic acid (FOLVITE) 1 MG tablet; TAKE 1 TABLET BY MOUTH ONCE DAILY  ---VASCULAR PANEL  A) asa, plavix, aggrenox  B) ELIQUIS, pletal, tzd, STATIN  C) ace, hctz, FOLIC, ccb  D) cannikinumab, fish oils       Cough  -     albuterol sulfate HFA (VENTOLIN HFA) 108 (90 Base) MCG/ACT inhaler;  Inhale 2 puffs into the lungs every 6 hours as needed for Wheezing    POTS (postural orthostatic tachycardia syndrome)  Long talk on treatment and prevention  Literature is given       Other hyperlipidemia  Long talk on treatment and prevention  Literature is given       Factor V deficiency ([facility] [address]  Long talk on treatment   Literature is given           Outpatient Encounter Medications as of 4/23/2019   Medication Sig Dispense Refill    topiramate (TOPAMAX) 100 MG tablet 100 mg 2 times daily   11    albuterol sulfate HFA (VENTOLIN HFA) 108 (90 Base) MCG/ACT inhaler Inhale 2 puffs into the lungs every 6 hours as needed for Wheezing 1 Inhaler 3    atorvastatin (LIPITOR) 10 MG tablet TAKE ONE TABLET BY MOUTH ONCE DAILY 90 tablet 1    metoprolol succinate (TOPROL XL) 25 MG extended release tablet Take 0.5 tablets by mouth daily 45 tablet 1    Cholecalciferol (VITAMIN D3) 2000 units TABS TAKE 1 TABLET BY MOUTH ONCE DAILY 90 tablet 1    folic acid (FOLVITE) 1 MG tablet TAKE 1 TABLET BY MOUTH ONCE DAILY 90 tablet 1    zolpidem (AMBIEN) 5 MG tablet Take 5 mg by mouth nightly as needed for Sleep. Yamil Whitney clonazePAM (KLONOPIN) 1 MG tablet Take 1 mg by mouth 2 times daily as needed. Yamil Whitney tiZANidine (ZANAFLEX) 2 MG tablet TAKE ONE TABLET BY MOUTH AT BEDTIME 90 tablet 1    pyridostigmine (MESTINON) 60 MG tablet Take 1 tablet by mouth 3 times daily 270 tablet 1    apixaban (ELIQUIS) 5 MG TABS tablet Take 5 mg by mouth 2 times daily      citalopram (CELEXA) 20 MG tablet Take 20 mg by mouth daily      acetaminophen (TYLENOL) 500 MG tablet Take 500 mg by mouth      hydrocortisone 2.5 % cream Apply topically 2 times daily. 1 Tube 3    fluticasone (FLONASE) 50 MCG/ACT nasal spray 1 spray by Nasal route daily 1 Bottle 3    [DISCONTINUED] folic acid (FOLVITE) 1 MG tablet TAKE 1 TABLET BY MOUTH ONCE DAILY 90 tablet 1    [DISCONTINUED] atorvastatin (LIPITOR) 10 MG tablet TAKE ONE TABLET BY MOUTH ONCE DAILY 90 tablet 1    [DISCONTINUED] metoprolol succinate (TOPROL XL) 25 MG extended release tablet Take 0.5 tablets by mouth daily 45 tablet 1    [DISCONTINUED] Cholecalciferol (VITAMIN D3) 2000 units TABS TAKE 1 TABLET BY MOUTH ONCE DAILY 90 tablet 1    [DISCONTINUED] topiramate (TOPAMAX) 25 MG tablet Take 1 tablet by mouth 4 times daily (Patient taking differently: Take 200 mg by mouth daily ) 120 tablet 3    [DISCONTINUED] albuterol sulfate HFA (VENTOLIN HFA) 108 (90 BASE) MCG/ACT inhaler Inhale 2 puffs into the lungs every 6 hours as needed for Wheezing 1 Inhaler 3     No facility-administered encounter medications on file as of 4/23/2019. No follow-ups on file.         Reviewed recent labs related to Gertrude's current

## 2019-04-23 NOTE — PATIENT INSTRUCTIONS
Patient Education        Hashimoto's Thyroiditis: Care Instructions  Your Care Instructions    Hashimoto's thyroiditis is a problem with the thyroid gland. The thyroid gland, which is in your neck, controls the way your body uses energy. Sometimes the disease causes the gland to make too much thyroid hormone (thyrotoxicosis). This can make you feel nervous, lose weight, and have many loose bowel movements. You may also have a fast heartbeat. But as the disease progresses, the gland usually does not make enough thyroid hormone. This can cause you to feel tired and have dry skin and thinning hair. Most people with Hashimoto's are diagnosed when they have these symptoms. You may need to take medicine if you have symptoms or if your thyroid hormone level is not normal. Most people with Hashimoto's thyroiditis need to take medicine for the rest of their lives. Follow-up care is a key part of your treatment and safety. Be sure to make and go to all appointments, and call your doctor if you are having problems. It's also a good idea to know your test results and keep a list of the medicines you take. How can you care for yourself at home? · Take your medicines exactly as prescribed. Call your doctor if you think you are having a problem with your medicine. You will get more details on the specific medicines your doctor prescribes. When should you call for help? Call 911 anytime you think you may need emergency care.  For example, call if:    · You passed out (lost consciousness).     · You have severe trouble breathing.     · You have a very slow heartbeat (less than 60 beats a minute).     · You have a low body temperature (95°F or below).    Watch closely for changes in your health, and be sure to contact your doctor if:    · You gain weight even though you are eating normally or less than usual.     · You feel extremely weak or tired.     · You have new changes in your skin, nails, or hair, or the changes get worse.     · You notice that your thyroid gland has grown or changed in size.     · You have constipation that is new or that gets worse.     · You cannot stand cold temperatures.     · You have heavy or irregular menstrual periods.     · You have other new symptoms. Where can you learn more? Go to [email or web address] and sign in to your [email or web address] account. Enter K454 in the [employer] box to learn more about \"Hashimoto's Thyroiditis: Care Instructions. \"     If you do not have an account, please click on the \"Sign Up Now\" link. Current as of: March 14, 2018  Content Version: 11.9  © 4748-9275 [employer]. Care instructions adapted under license by [facility] ([facility]). If you have questions about a medical condition or this instruction, always ask your healthcare professional. [address] any warranty or liability for your use of this information.

## 2019-05-07 ENCOUNTER — OFFICE VISIT (OUTPATIENT)
Dept: FAMILY MEDICINE CLINIC | Age: 29
End: 2019-05-07
Payer: MEDICARE

## 2019-05-07 VITALS
BODY MASS INDEX: 40.15 KG/M2 | DIASTOLIC BLOOD PRESSURE: 62 MMHG | HEART RATE: 73 BPM | WEIGHT: 241 LBS | TEMPERATURE: 97.6 F | OXYGEN SATURATION: 99 % | SYSTOLIC BLOOD PRESSURE: 112 MMHG | HEIGHT: 65 IN

## 2019-05-07 DIAGNOSIS — E66.01 MORBID OBESITY WITH BMI OF 40.0-44.9, ADULT (HCC): ICD-10-CM

## 2019-05-07 DIAGNOSIS — J45.20 MILD INTERMITTENT ASTHMA WITHOUT COMPLICATION: ICD-10-CM

## 2019-05-07 DIAGNOSIS — E66.01 MORBID OBESITY (HCC): ICD-10-CM

## 2019-05-07 DIAGNOSIS — J40 BRONCHITIS: Primary | ICD-10-CM

## 2019-05-07 DIAGNOSIS — G90.A POTS (POSTURAL ORTHOSTATIC TACHYCARDIA SYNDROME): ICD-10-CM

## 2019-05-07 DIAGNOSIS — J01.90 ACUTE SINUSITIS, RECURRENCE NOT SPECIFIED, UNSPECIFIED LOCATION: ICD-10-CM

## 2019-05-07 PROCEDURE — 1036F TOBACCO NON-USER: CPT | Performed by: FAMILY MEDICINE

## 2019-05-07 PROCEDURE — G8417 CALC BMI ABV UP PARAM F/U: HCPCS | Performed by: FAMILY MEDICINE

## 2019-05-07 PROCEDURE — G8427 DOCREV CUR MEDS BY ELIG CLIN: HCPCS | Performed by: FAMILY MEDICINE

## 2019-05-07 PROCEDURE — G8598 ASA/ANTIPLAT THER USED: HCPCS | Performed by: FAMILY MEDICINE

## 2019-05-07 PROCEDURE — 99213 OFFICE O/P EST LOW 20 MIN: CPT | Performed by: FAMILY MEDICINE

## 2019-05-07 RX ORDER — GUAIFENESIN 600 MG/1
600 TABLET, EXTENDED RELEASE ORAL 2 TIMES DAILY
Qty: 30 TABLET | Refills: 0 | Status: SHIPPED | OUTPATIENT
Start: 2019-05-07 | End: 2019-05-22

## 2019-05-07 RX ORDER — AMOXICILLIN 500 MG/1
500 CAPSULE ORAL 3 TIMES DAILY
Qty: 30 CAPSULE | Refills: 0 | Status: SHIPPED | OUTPATIENT
Start: 2019-05-07 | End: 2019-05-17

## 2019-05-07 ASSESSMENT — ENCOUNTER SYMPTOMS
ABDOMINAL PAIN: 0
GASTROINTESTINAL NEGATIVE: 1
TROUBLE SWALLOWING: 0
EYE DISCHARGE: 0
CHEST TIGHTNESS: 0
COLOR CHANGE: 0
CONSTIPATION: 0
FACIAL SWELLING: 0
BLOOD IN STOOL: 0
SHORTNESS OF BREATH: 0
APNEA: 0
RECTAL PAIN: 0
BACK PAIN: 1
ALLERGIC/IMMUNOLOGIC NEGATIVE: 1
RESPIRATORY NEGATIVE: 1
STRIDOR: 0
ANAL BLEEDING: 0
EYE ITCHING: 0
ABDOMINAL DISTENTION: 0
CHOKING: 0
VOICE CHANGE: 0

## 2019-05-07 NOTE — PROGRESS NOTES
Richy Reynolds is a 29 y.o. female. HPI/Chief C/O:  Chief Complaint   Patient presents with    URI     dry cough; unable to bring anything up; symptoms started last night    Headache     Allergies   Allergen Reactions    Latex     Latex Dermatitis    Prednisone     Prednisone Other (See Comments)     Becomes aggressive per mother    Prozac [Fluoxetine Hcl] Other (See Comments)     Suicidal thoughts   She is here with sinus and a sore throat    Hypertension   This is a chronic problem. The current episode started more than 1 year ago. The problem is controlled. Associated symptoms include anxiety, malaise/fatigue and neck pain. Pertinent negatives include no blurred vision, chest pain, headaches, orthopnea, palpitations, peripheral edema, PND, shortness of breath or sweats. Risk factors for coronary artery disease include family history. Past treatments include lifestyle changes and beta blockers. The current treatment provides significant improvement. Compliance problems include exercise, diet and psychosocial issues. Hypertensive end-organ damage includes CVA. There is no history of angina, kidney disease, CAD/MI, heart failure, left ventricular hypertrophy, PVD (H/O DVT lower extremity ) or retinopathy. Identifiable causes of hypertension include a thyroid problem. There is no history of chronic renal disease, coarctation of the aorta, hyperaldosteronism, hypercortisolism, hyperparathyroidism, a hypertension causing med, pheochromocytoma, renovascular disease or sleep apnea. Back Pain   This is a chronic problem. The current episode started more than 1 month ago. The problem occurs constantly. The problem has been gradually worsening since onset. The pain is present in the lumbar spine. The quality of the pain is described as shooting and stabbing. The pain is at a severity of 8/10. The pain is moderate. The pain is the same all the time. Stiffness is present all day.  Associated symptoms include leg pain, tingling and weakness. Pertinent negatives include no abdominal pain, bladder incontinence, bowel incontinence, chest pain, dysuria, fever, headaches, numbness, paresis, paresthesias, pelvic pain, perianal numbness or weight loss. ROS:  Review of Systems   Constitutional: Positive for fatigue and malaise/fatigue. Negative for activity change, appetite change, chills, diaphoresis, fever, unexpected weight change and weight loss. HENT: Positive for postnasal drip. Negative for dental problem, drooling, ear discharge, facial swelling, mouth sores, nosebleeds, trouble swallowing and voice change. Eyes: Negative for blurred vision, discharge, itching and visual disturbance. Respiratory: Negative. Negative for apnea, choking, chest tightness, shortness of breath and stridor. Cardiovascular: Negative. Negative for chest pain, palpitations, orthopnea, leg swelling and PND. Gastrointestinal: Negative. Negative for abdominal distention, abdominal pain, anal bleeding, blood in stool, bowel incontinence, constipation and rectal pain. Endocrine: Negative. Negative for cold intolerance, heat intolerance, polydipsia, polyphagia and polyuria. Genitourinary: Negative. Negative for bladder incontinence, decreased urine volume, difficulty urinating, dyspareunia, dysuria, enuresis, flank pain, frequency, genital sores, hematuria, menstrual problem, pelvic pain, urgency, vaginal bleeding, vaginal discharge and vaginal pain. Musculoskeletal: Positive for arthralgias, back pain, gait problem, myalgias and neck pain. Negative for joint swelling and neck stiffness. Skin: Negative. Negative for color change, pallor and wound. Allergic/Immunologic: Negative. Negative for environmental allergies, food allergies and immunocompromised state. Neurological: Positive for tingling and weakness.  Negative for tremors, syncope, facial asymmetry, speech difficulty, light-headedness, numbness, headaches and paresthesias. Hematological: Negative for adenopathy. Bruises/bleeds easily. Psychiatric/Behavioral: Positive for decreased concentration and dysphoric mood. Negative for agitation, behavioral problems, confusion, hallucinations, self-injury, sleep disturbance and suicidal ideas. The patient is nervous/anxious. The patient is not hyperactive. Past Medical/Surgical Hx;  Reviewed with patient      Diagnosis Date    Anxiety     Asthma     Blood clot embolism during pregnancy, antepartum     Cerebral artery occlusion with cerebral infarction [facility] [address]     DVT (deep vein thrombosis) in pregnancy ([facility] [address] 11/2015    DVT of leg (deep venous thrombosis) (HCC)     left    DVT of upper extremity (deep vein thrombosis) (HCC)     Left X 2    POTS (postural orthostatic tachycardia syndrome)     TIA (transient ischemic attack)      Past Surgical History:   Procedure Laterality Date    ARM SURGERY Right     NERVE BLOCK Left 08/02/2018    transforaminal epidural #1    NE ZEHRA NOSE/CLEFT LIP/TIP Left 8/2/2018    LEFT L5-S1 TRANSFORAMINAL EPIDURAL STEROID INJECTION #1 performed by Mook Sanz DO at [address] [place] [address]       Past Family Hx:  Reviewed with patient      Problem Relation Age of Onset    Deep Vein Thrombosis Maternal Grandmother        Social Hx:  Reviewed with patient  Social History     Tobacco Use    Smoking status: Former Smoker     Types: Cigarettes    Smokeless tobacco: Never Used   Substance Use Topics    Alcohol use: No     Alcohol/week: 0.0 oz       OBJECTIVE  /62   Pulse 73   Temp 97.6 °F (36.4 °C)   Ht 5' 5\" (1.651 m)   Wt 241 lb (109.3 kg)   LMP 03/22/2019 (Exact Date)   SpO2 99%   Breastfeeding? No   BMI 40.10 kg/m²     Problem List:  [facility] does not have any pertinent problems on file. PHYS EX:  Physical Exam   Constitutional: She is oriented to person, place, and time. She appears well-developed and well-nourished. No distress.    HENT:   Head: and headache. Diagnoses and all orders for this visit:    Bronchitis  -     XR CHEST STANDARD (2 VW); Future  -     guaiFENesin (MUCINEX) 600 MG extended release tablet; Take 1 tablet by mouth 2 times daily for 15 days  -     amoxicillin (AMOXIL) 500 MG capsule; Take 1 capsule by mouth 3 times daily for 10 days  --PLAN--aerosol accuneb 1.25 plus chest percussion--Rx      Acute sinusitis, recurrence not specified, unspecified location  -     guaiFENesin (MUCINEX) 600 MG extended release tablet; Take 1 tablet by mouth 2 times daily for 15 days  -     amoxicillin (AMOXIL) 500 MG capsule; Take 1 capsule by mouth 3 times daily for 10 days  PLAN---irrigate right and left sinus---Rx      POTS (postural orthostatic tachycardia syndrome)  --stable on current care planning  -- continue treatment as we are meeting goals   Long talk on treatment and prevention  Literature is given       Mild intermittent asthma without complication  --PLAN--aerosol accuneb 1.25 plus chest percussion--Rx    Morbid obesity  Long talk on treatment and prevention  Literature is given       ---CARDIAC---ELIQUIS, ace, BETA, STATIN, hctz, ( ccb )    Quality & Risk Score Accuracy    Visit Dx:  E66.01, Z68.41 - Morbid obesity with BMI of 40.0-44.9, adult ([facility] [address]  The current BMI is 40.10 kg/m2 as of 05/08/19 05:55 EDT  Assessment and plan:  Stable based upon symptoms and exam. Continue current treatment plan and follow up at least yearly.   Last edited 05/08/19 05:55 EDT by Greta Peter, DO           Outpatient Encounter Medications as of 5/7/2019   Medication Sig Dispense Refill    guaiFENesin (MUCINEX) 600 MG extended release tablet Take 1 tablet by mouth 2 times daily for 15 days 30 tablet 0    amoxicillin (AMOXIL) 500 MG capsule Take 1 capsule by mouth 3 times daily for 10 days 30 capsule 0    topiramate (TOPAMAX) 100 MG tablet 100 mg 2 times daily   11    albuterol sulfate HFA (VENTOLIN HFA) 108 (90 Base) MCG/ACT inhaler Inhale 2 puffs

## 2019-05-07 NOTE — PATIENT INSTRUCTIONS
Patient Education        Low Blood Pressure: Care Instructions  Your Care Instructions    Blood pressure is a measurement of the force of the blood against the walls of the blood vessels during and after each beat of the heart. Low blood pressure is also called hypotension. It means that your blood pressure is much lower than normal. Some people, especially young, slim women, may have slightly low blood pressure without symptoms. But in many people, low blood pressure can cause symptoms such as feeling dizzy or lightheaded. When your blood pressure is too low, your heart, brain, and other organs do not get enough blood. Low blood pressure can be caused by many things, including heart problems and some medicines. Diabetes that is not under control can cause your blood pressure to drop. And so can a severe allergic reaction or infection. Another cause is dehydration, which is when your body loses too much fluid. Treatment for low blood pressure depends on the cause. Follow-up care is a key part of your treatment and safety. Be sure to make and go to all appointments, and call your doctor if you are having problems. It's also a good idea to know your test results and keep a list of the medicines you take. How can you care for yourself at home? · Drink plenty of fluids, enough so that your urine is light yellow or clear like water. If you have kidney, heart, or liver disease and have to limit fluids, talk with your doctor before you increase the amount of fluids you drink. · Be safe with medicines. Call your doctor if you think you are having a problem with your medicine. You will get more details on the specific medicines your doctor prescribes. · Stand up or get out of bed very slowly to allow your body to adjust.  · Get plenty of rest.  · Do not smoke. Smoking increases your risk of heart attack. If you need help quitting, talk to your doctor about stop-smoking programs and medicines.  These can increase your chances of quitting for good. · Limit alcohol to 2 drinks a day for men and 1 drink a day for women. Alcohol may interfere with your medicine. In addition, alcohol can make your low blood pressure worse by causing your body to lose water. When should you call for help? Call 911 anytime you think you may need emergency care. For example, call if:    · You passed out (lost consciousness).    Call your doctor now or seek immediate medical care if:    · You are dizzy or lightheaded, or you feel like you may faint.    Watch closely for changes in your health, and be sure to contact your doctor if you have any problems. Where can you learn more? Go to [email or web address] and sign in to your NWIX account. Enter C304 in the [employer] box to learn more about \"Low Blood Pressure: Care Instructions. \"     If you do not have an account, please click on the \"Sign Up Now\" link. Current as of: July 22, 2018  Content Version: 11.9  © 0846-2176 [employer]. Care instructions adapted under license by [facility] [address] If you have questions about a medical condition or this instruction, always ask your healthcare professional. [place] [address] any warranty or liability for your use of this information.

## 2019-05-08 ASSESSMENT — ENCOUNTER SYMPTOMS
BOWEL INCONTINENCE: 0
ORTHOPNEA: 0
BLURRED VISION: 0

## 2019-09-16 ENCOUNTER — CARE COORDINATION (OUTPATIENT)
Dept: CARE COORDINATION | Age: 29
End: 2019-09-16

## 2019-09-16 NOTE — CARE COORDINATION
-ACM phoned patient to offer enrollment into care coordination, no answer, VM left requesting call back, contact information provided.     PLAN: Send introductory letter

## 2019-09-16 NOTE — LETTER
9/16/2019    Sandee Valdes  [address] [address]      Dear Sandee Valdes,    My name is Aubrie Conti and I am a registered nurse who partners with Piotr Cancino DO to improve patients' health. Moody De La Rosa DO believes you would benefit from working with me. As a member of your health care team, I would work with other providers involved in your care, offer education for your specific health conditions, and connect you with additional resources as needed. I will collaborate with Piotr Cancino DO to support you in following your treatment plan. The additional support I provide is no additional cost to you. My primary focus is to help you achieve specific goals and improve your health. We are committed to walk with you on this journey and look forward to working with you. Please call me to further discuss your healthcare needs. I am available by phone or for appointments at the office. You can reach me at  [phone] or office phone: [phone].     In good health,     Aubrie Conti RN

## 2019-09-23 ENCOUNTER — CARE COORDINATION (OUTPATIENT)
Dept: CARE COORDINATION | Age: 29
End: 2019-09-23

## 2019-09-27 ENCOUNTER — CARE COORDINATION (OUTPATIENT)
Dept: CARE COORDINATION | Age: 29
End: 2019-09-27

## 2019-09-27 DIAGNOSIS — Z76.89 ENCOUNTER FOR WEIGHT MANAGEMENT: ICD-10-CM

## 2019-09-27 DIAGNOSIS — G90.A POTS (POSTURAL ORTHOSTATIC TACHYCARDIA SYNDROME): Primary | ICD-10-CM

## 2019-09-27 SDOH — HEALTH STABILITY: PHYSICAL HEALTH: ON AVERAGE, HOW MANY MINUTES DO YOU ENGAGE IN EXERCISE AT THIS LEVEL?: 30 MIN

## 2019-09-27 SDOH — HEALTH STABILITY: MENTAL HEALTH
STRESS IS WHEN SOMEONE FEELS TENSE, NERVOUS, ANXIOUS, OR CAN'T SLEEP AT NIGHT BECAUSE THEIR MIND IS TROUBLED. HOW STRESSED ARE YOU?: VERY MUCH

## 2019-09-27 SDOH — SOCIAL STABILITY: SOCIAL NETWORK: HOW OFTEN DO YOU ATTENT MEETINGS OF THE CLUB OR ORGANIZATION YOU BELONG TO?: NEVER

## 2019-09-27 SDOH — ECONOMIC STABILITY: TRANSPORTATION INSECURITY
IN THE PAST 12 MONTHS, HAS LACK OF TRANSPORTATION KEPT YOU FROM MEETINGS, WORK, OR FROM GETTING THINGS NEEDED FOR DAILY LIVING?: NO

## 2019-09-27 SDOH — SOCIAL STABILITY: SOCIAL NETWORK
IN A TYPICAL WEEK, HOW MANY TIMES DO YOU TALK ON THE PHONE WITH FAMILY, FRIENDS, OR NEIGHBORS?: MORE THAN THREE TIMES A WEEK

## 2019-09-27 SDOH — ECONOMIC STABILITY: FOOD INSECURITY: WITHIN THE PAST 12 MONTHS, YOU WORRIED THAT YOUR FOOD WOULD RUN OUT BEFORE YOU GOT MONEY TO BUY MORE.: SOMETIMES TRUE

## 2019-09-27 SDOH — SOCIAL STABILITY: SOCIAL NETWORK
DO YOU BELONG TO ANY CLUBS OR ORGANIZATIONS SUCH AS CHURCH GROUPS UNIONS, FRATERNAL OR ATHLETIC GROUPS, OR SCHOOL GROUPS?: NO

## 2019-09-27 SDOH — ECONOMIC STABILITY: TRANSPORTATION INSECURITY
IN THE PAST 12 MONTHS, HAS THE LACK OF TRANSPORTATION KEPT YOU FROM MEDICAL APPOINTMENTS OR FROM GETTING MEDICATIONS?: NO

## 2019-09-27 SDOH — ECONOMIC STABILITY: FOOD INSECURITY: WITHIN THE PAST 12 MONTHS, THE FOOD YOU BOUGHT JUST DIDN'T LAST AND YOU DIDN'T HAVE MONEY TO GET MORE.: SOMETIMES TRUE

## 2019-09-27 SDOH — ECONOMIC STABILITY: INCOME INSECURITY: HOW HARD IS IT FOR YOU TO PAY FOR THE VERY BASICS LIKE FOOD, HOUSING, MEDICAL CARE, AND HEATING?: VERY HARD

## 2019-09-27 SDOH — SOCIAL STABILITY: SOCIAL NETWORK: HOW OFTEN DO YOU GET TOGETHER WITH FRIENDS OR RELATIVES?: NEVER

## 2019-09-27 SDOH — SOCIAL STABILITY: SOCIAL NETWORK: HOW OFTEN DO YOU ATTEND CHURCH OR RELIGIOUS SERVICES?: NEVER

## 2019-09-27 SDOH — SOCIAL STABILITY: SOCIAL NETWORK: ARE YOU MARRIED, WIDOWED, DIVORCED, SEPARATED, NEVER MARRIED, OR LIVING WITH A PARTNER?: MARRIED

## 2019-09-27 SDOH — HEALTH STABILITY: PHYSICAL HEALTH: ON AVERAGE, HOW MANY DAYS PER WEEK DO YOU ENGAGE IN MODERATE TO STRENUOUS EXERCISE (LIKE A BRISK WALK)?: 2 DAYS

## 2019-10-11 ENCOUNTER — OFFICE VISIT (OUTPATIENT)
Dept: FAMILY MEDICINE CLINIC | Age: 29
End: 2019-10-11
Payer: MEDICARE

## 2019-10-11 VITALS
DIASTOLIC BLOOD PRESSURE: 66 MMHG | WEIGHT: 227 LBS | SYSTOLIC BLOOD PRESSURE: 109 MMHG | BODY MASS INDEX: 36.48 KG/M2 | TEMPERATURE: 97.9 F | HEIGHT: 66 IN | HEART RATE: 78 BPM | OXYGEN SATURATION: 98 %

## 2019-10-11 DIAGNOSIS — E78.5 DYSLIPIDEMIA: ICD-10-CM

## 2019-10-11 DIAGNOSIS — J45.20 MILD INTERMITTENT ASTHMA WITHOUT COMPLICATION: Primary | ICD-10-CM

## 2019-10-11 DIAGNOSIS — R73.01 IFG (IMPAIRED FASTING GLUCOSE): ICD-10-CM

## 2019-10-11 DIAGNOSIS — I63.50 CEREBRAL ARTERY OCCLUSION WITH CEREBRAL INFARCTION (HCC): ICD-10-CM

## 2019-10-11 DIAGNOSIS — R53.83 FATIGUE, UNSPECIFIED TYPE: ICD-10-CM

## 2019-10-11 DIAGNOSIS — L30.9 DERMATITIS: ICD-10-CM

## 2019-10-11 DIAGNOSIS — G90.A POTS (POSTURAL ORTHOSTATIC TACHYCARDIA SYNDROME): ICD-10-CM

## 2019-10-11 DIAGNOSIS — I87.009 POST-PHLEBITIC SYNDROME: ICD-10-CM

## 2019-10-11 DIAGNOSIS — D68.2 FACTOR V DEFICIENCY (HCC): ICD-10-CM

## 2019-10-11 PROCEDURE — 99214 OFFICE O/P EST MOD 30 MIN: CPT | Performed by: FAMILY MEDICINE

## 2019-10-11 PROCEDURE — G8598 ASA/ANTIPLAT THER USED: HCPCS | Performed by: FAMILY MEDICINE

## 2019-10-11 PROCEDURE — G8417 CALC BMI ABV UP PARAM F/U: HCPCS | Performed by: FAMILY MEDICINE

## 2019-10-11 PROCEDURE — 1036F TOBACCO NON-USER: CPT | Performed by: FAMILY MEDICINE

## 2019-10-11 PROCEDURE — G8427 DOCREV CUR MEDS BY ELIG CLIN: HCPCS | Performed by: FAMILY MEDICINE

## 2019-10-11 PROCEDURE — G8484 FLU IMMUNIZE NO ADMIN: HCPCS | Performed by: FAMILY MEDICINE

## 2019-10-11 RX ORDER — VALACYCLOVIR HYDROCHLORIDE 1 G/1
1000 TABLET, FILM COATED ORAL 3 TIMES DAILY
Qty: 21 TABLET | Refills: 1 | Status: SHIPPED | OUTPATIENT
Start: 2019-10-11 | End: 2019-10-23 | Stop reason: ALTCHOICE

## 2019-10-11 RX ORDER — FEXOFENADINE HCL 180 MG/1
180 TABLET ORAL DAILY
Qty: 30 TABLET | Refills: 0 | Status: SHIPPED | OUTPATIENT
Start: 2019-10-11 | End: 2019-10-23 | Stop reason: ALTCHOICE

## 2019-10-11 ASSESSMENT — ENCOUNTER SYMPTOMS
CHOKING: 0
RECTAL PAIN: 0
NAUSEA: 0
ALLERGIC/IMMUNOLOGIC NEGATIVE: 1
ORTHOPNEA: 0
FACIAL SWELLING: 0
BLOOD IN STOOL: 0
RESPIRATORY NEGATIVE: 1
ABDOMINAL DISTENTION: 0
SINUS PRESSURE: 0
CHEST TIGHTNESS: 0
VOICE CHANGE: 0
ANAL BLEEDING: 0
WHEEZING: 0
PHOTOPHOBIA: 0
SINUS PAIN: 0
EYE DISCHARGE: 0
ABDOMINAL PAIN: 0
BACK PAIN: 1
SHORTNESS OF BREATH: 0
TROUBLE SWALLOWING: 0
EYE REDNESS: 0
STRIDOR: 0
BLURRED VISION: 0
CONSTIPATION: 0
APNEA: 0
EYE ITCHING: 0
GASTROINTESTINAL NEGATIVE: 1
COLOR CHANGE: 0

## 2019-10-14 ENCOUNTER — CARE COORDINATION (OUTPATIENT)
Dept: CARE COORDINATION | Age: 29
End: 2019-10-14

## 2019-10-17 ENCOUNTER — CARE COORDINATION (OUTPATIENT)
Dept: CARE COORDINATION | Age: 29
End: 2019-10-17

## 2019-10-22 ENCOUNTER — HOSPITAL ENCOUNTER (OUTPATIENT)
Age: 29
Discharge: HOME | End: 2019-10-22
Payer: MEDICARE

## 2019-10-22 DIAGNOSIS — D68.2 FACTOR V DEFICIENCY (HCC): ICD-10-CM

## 2019-10-22 DIAGNOSIS — R73.01 IFG (IMPAIRED FASTING GLUCOSE): ICD-10-CM

## 2019-10-22 DIAGNOSIS — I63.50 CEREBRAL ARTERY OCCLUSION WITH CEREBRAL INFARCTION (HCC): ICD-10-CM

## 2019-10-22 DIAGNOSIS — G90.A POTS (POSTURAL ORTHOSTATIC TACHYCARDIA SYNDROME): ICD-10-CM

## 2019-10-22 DIAGNOSIS — R53.83 FATIGUE, UNSPECIFIED TYPE: ICD-10-CM

## 2019-10-22 DIAGNOSIS — I87.009 POST-PHLEBITIC SYNDROME: ICD-10-CM

## 2019-10-22 DIAGNOSIS — E78.5 DYSLIPIDEMIA: ICD-10-CM

## 2019-10-22 DIAGNOSIS — J45.20 MILD INTERMITTENT ASTHMA WITHOUT COMPLICATION: ICD-10-CM

## 2019-10-22 DIAGNOSIS — L30.9 DERMATITIS: ICD-10-CM

## 2019-10-22 LAB
ANION GAP SERPL CALCULATED.3IONS-SCNC: 12 MMOL/L (ref 7–16)
BASOPHILS ABSOLUTE: 0.05 E9/L (ref 0–0.2)
BASOPHILS RELATIVE PERCENT: 0.9 % (ref 0–2)
BUN BLDV-MCNC: 11 MG/DL (ref 6–20)
CALCIUM SERPL-MCNC: 10.1 MG/DL (ref 8.6–10.2)
CHLORIDE BLD-SCNC: 108 MMOL/L (ref 98–107)
CHOLESTEROL, TOTAL: 153 MG/DL (ref 0–199)
CO2: 20 MMOL/L (ref 22–29)
CREAT SERPL-MCNC: 0.9 MG/DL (ref 0.5–1)
EOSINOPHILS ABSOLUTE: 0.15 E9/L (ref 0.05–0.5)
EOSINOPHILS RELATIVE PERCENT: 2.7 % (ref 0–6)
GFR AFRICAN AMERICAN: >60
GFR NON-AFRICAN AMERICAN: >60 ML/MIN/1.73
GLUCOSE BLD-MCNC: 102 MG/DL (ref 74–99)
HBA1C MFR BLD: 5.2 % (ref 4–5.6)
HCT VFR BLD CALC: 41.2 % (ref 34–48)
HDLC SERPL-MCNC: 47 MG/DL
HEMOGLOBIN: 14 G/DL (ref 11.5–15.5)
IMMATURE GRANULOCYTES #: 0.02 E9/L
IMMATURE GRANULOCYTES %: 0.4 % (ref 0–5)
LDL CHOLESTEROL CALCULATED: 82 MG/DL (ref 0–99)
LYMPHOCYTES ABSOLUTE: 1.78 E9/L (ref 1.5–4)
LYMPHOCYTES RELATIVE PERCENT: 32 % (ref 20–42)
MCH RBC QN AUTO: 31.6 PG (ref 26–35)
MCHC RBC AUTO-ENTMCNC: 34 % (ref 32–34.5)
MCV RBC AUTO: 93 FL (ref 80–99.9)
MONOCYTES ABSOLUTE: 0.53 E9/L (ref 0.1–0.95)
MONOCYTES RELATIVE PERCENT: 9.5 % (ref 2–12)
NEUTROPHILS ABSOLUTE: 3.03 E9/L (ref 1.8–7.3)
NEUTROPHILS RELATIVE PERCENT: 54.5 % (ref 43–80)
PDW BLD-RTO: 12.6 FL (ref 11.5–15)
PLATELET # BLD: 300 E9/L (ref 130–450)
PMV BLD AUTO: 9.6 FL (ref 7–12)
POTASSIUM SERPL-SCNC: 4.2 MMOL/L (ref 3.5–5)
RBC # BLD: 4.43 E12/L (ref 3.5–5.5)
SODIUM BLD-SCNC: 140 MMOL/L (ref 132–146)
TRIGL SERPL-MCNC: 121 MG/DL (ref 0–149)
TSH SERPL DL<=0.05 MIU/L-ACNC: 1.67 UIU/ML (ref 0.27–4.2)
VLDLC SERPL CALC-MCNC: 24 MG/DL
WBC # BLD: 5.6 E9/L (ref 4.5–11.5)

## 2019-10-22 PROCEDURE — 84443 ASSAY THYROID STIM HORMONE: CPT

## 2019-10-22 PROCEDURE — 80061 LIPID PANEL: CPT

## 2019-10-22 PROCEDURE — 83036 HEMOGLOBIN GLYCOSYLATED A1C: CPT

## 2019-10-22 PROCEDURE — 36415 COLL VENOUS BLD VENIPUNCTURE: CPT

## 2019-10-22 PROCEDURE — 80048 BASIC METABOLIC PNL TOTAL CA: CPT

## 2019-10-22 PROCEDURE — 85025 COMPLETE CBC W/AUTO DIFF WBC: CPT

## 2019-10-23 ENCOUNTER — OFFICE VISIT (OUTPATIENT)
Dept: FAMILY MEDICINE CLINIC | Age: 29
End: 2019-10-23
Payer: MEDICARE

## 2019-10-23 VITALS
OXYGEN SATURATION: 98 % | DIASTOLIC BLOOD PRESSURE: 70 MMHG | TEMPERATURE: 98.4 F | HEART RATE: 76 BPM | SYSTOLIC BLOOD PRESSURE: 116 MMHG | BODY MASS INDEX: 36.13 KG/M2 | HEIGHT: 66 IN | RESPIRATION RATE: 18 BRPM | WEIGHT: 224.8 LBS

## 2019-10-23 DIAGNOSIS — Z23 IMMUNIZATION DUE: ICD-10-CM

## 2019-10-23 DIAGNOSIS — R05.9 COUGH: ICD-10-CM

## 2019-10-23 DIAGNOSIS — I63.50 CEREBRAL ARTERY OCCLUSION WITH CEREBRAL INFARCTION (HCC): Chronic | ICD-10-CM

## 2019-10-23 DIAGNOSIS — Z00.00 ROUTINE GENERAL MEDICAL EXAMINATION AT A HEALTH CARE FACILITY: ICD-10-CM

## 2019-10-23 DIAGNOSIS — Z00.00 ENCOUNTER FOR MEDICARE ANNUAL WELLNESS EXAM: Primary | ICD-10-CM

## 2019-10-23 DIAGNOSIS — I10 ESSENTIAL HYPERTENSION: ICD-10-CM

## 2019-10-23 DIAGNOSIS — E78.5 DYSLIPIDEMIA: ICD-10-CM

## 2019-10-23 DIAGNOSIS — R79.89 LOW VITAMIN D LEVEL: ICD-10-CM

## 2019-10-23 PROCEDURE — G0438 PPPS, INITIAL VISIT: HCPCS | Performed by: FAMILY MEDICINE

## 2019-10-23 PROCEDURE — G0008 ADMIN INFLUENZA VIRUS VAC: HCPCS | Performed by: FAMILY MEDICINE

## 2019-10-23 PROCEDURE — 90686 IIV4 VACC NO PRSV 0.5 ML IM: CPT | Performed by: FAMILY MEDICINE

## 2019-10-23 PROCEDURE — G8482 FLU IMMUNIZE ORDER/ADMIN: HCPCS | Performed by: FAMILY MEDICINE

## 2019-10-23 PROCEDURE — G8598 ASA/ANTIPLAT THER USED: HCPCS | Performed by: FAMILY MEDICINE

## 2019-10-23 RX ORDER — CHOLECALCIFEROL (VITAMIN D3) 125 MCG
CAPSULE ORAL
Qty: 90 TABLET | Refills: 1 | Status: SHIPPED
Start: 2019-10-23 | End: 2020-03-26

## 2019-10-23 RX ORDER — ALBUTEROL SULFATE 90 UG/1
2 AEROSOL, METERED RESPIRATORY (INHALATION) EVERY 6 HOURS PRN
Qty: 1 INHALER | Refills: 3 | Status: SHIPPED
Start: 2019-10-23 | End: 2020-05-22 | Stop reason: SDUPTHER

## 2019-10-23 RX ORDER — FLUTICASONE PROPIONATE 50 MCG
1 SPRAY, SUSPENSION (ML) NASAL DAILY
Qty: 1 BOTTLE | Refills: 3 | Status: SHIPPED
Start: 2019-10-23 | End: 2021-09-02

## 2019-10-23 RX ORDER — FOLIC ACID 1 MG/1
TABLET ORAL
Qty: 90 TABLET | Refills: 1 | Status: SHIPPED
Start: 2019-10-23 | End: 2020-03-26

## 2019-10-23 RX ORDER — ZOLPIDEM TARTRATE 5 MG/1
5 TABLET ORAL NIGHTLY PRN
COMMUNITY
End: 2020-05-22

## 2019-10-23 RX ORDER — ATORVASTATIN CALCIUM 10 MG/1
TABLET, FILM COATED ORAL
Qty: 90 TABLET | Refills: 1 | Status: SHIPPED
Start: 2019-10-23 | End: 2020-05-22 | Stop reason: SDUPTHER

## 2019-10-23 RX ORDER — METOPROLOL SUCCINATE 25 MG/1
12.5 TABLET, EXTENDED RELEASE ORAL DAILY
Qty: 45 TABLET | Refills: 1 | Status: SHIPPED
Start: 2019-10-23 | End: 2020-03-26

## 2019-10-23 ASSESSMENT — LIFESTYLE VARIABLES
HOW OFTEN DURING THE LAST YEAR HAVE YOU FOUND THAT YOU WERE NOT ABLE TO STOP DRINKING ONCE YOU HAD STARTED: 0
HAVE YOU OR SOMEONE ELSE BEEN INJURED AS A RESULT OF YOUR DRINKING: 0
HOW OFTEN DO YOU HAVE A DRINK CONTAINING ALCOHOL: 1
HOW OFTEN DURING THE LAST YEAR HAVE YOU FAILED TO DO WHAT WAS NORMALLY EXPECTED FROM YOU BECAUSE OF DRINKING: 0
HOW OFTEN DURING THE LAST YEAR HAVE YOU NEEDED AN ALCOHOLIC DRINK FIRST THING IN THE MORNING TO GET YOURSELF GOING AFTER A NIGHT OF HEAVY DRINKING: 0
HOW MANY STANDARD DRINKS CONTAINING ALCOHOL DO YOU HAVE ON A TYPICAL DAY: 0
AUDIT-C TOTAL SCORE: 1
HAS A RELATIVE, FRIEND, DOCTOR, OR ANOTHER HEALTH PROFESSIONAL EXPRESSED CONCERN ABOUT YOUR DRINKING OR SUGGESTED YOU CUT DOWN: 0
HOW OFTEN DO YOU HAVE SIX OR MORE DRINKS ON ONE OCCASION: 0
HOW OFTEN DURING THE LAST YEAR HAVE YOU BEEN UNABLE TO REMEMBER WHAT HAPPENED THE NIGHT BEFORE BECAUSE YOU HAD BEEN DRINKING: 0
HOW OFTEN DURING THE LAST YEAR HAVE YOU HAD A FEELING OF GUILT OR REMORSE AFTER DRINKING: 0
AUDIT TOTAL SCORE: 1

## 2019-10-23 ASSESSMENT — PATIENT HEALTH QUESTIONNAIRE - PHQ9: SUM OF ALL RESPONSES TO PHQ QUESTIONS 1-9: 13

## 2019-10-31 ENCOUNTER — CARE COORDINATION (OUTPATIENT)
Dept: CARE COORDINATION | Age: 29
End: 2019-10-31

## 2019-11-11 ENCOUNTER — CARE COORDINATION (OUTPATIENT)
Dept: CARE COORDINATION | Age: 29
End: 2019-11-11

## 2019-11-19 ENCOUNTER — CARE COORDINATION (OUTPATIENT)
Dept: CARE COORDINATION | Age: 29
End: 2019-11-19

## 2019-12-02 ENCOUNTER — CARE COORDINATION (OUTPATIENT)
Dept: CARE COORDINATION | Age: 29
End: 2019-12-02

## 2019-12-11 ENCOUNTER — CARE COORDINATION (OUTPATIENT)
Dept: CARE COORDINATION | Age: 29
End: 2019-12-11

## 2019-12-12 ENCOUNTER — TELEPHONE (OUTPATIENT)
Dept: FAMILY MEDICINE CLINIC | Age: 29
End: 2019-12-12

## 2019-12-19 ENCOUNTER — CARE COORDINATION (OUTPATIENT)
Dept: CARE COORDINATION | Age: 29
End: 2019-12-19

## 2020-05-18 RX ORDER — CHOLECALCIFEROL (VITAMIN D3) 125 MCG
CAPSULE ORAL
Qty: 90 TABLET | Refills: 0 | OUTPATIENT
Start: 2020-05-18

## 2020-05-18 RX ORDER — ALBUTEROL SULFATE 90 UG/1
AEROSOL, METERED RESPIRATORY (INHALATION)
Qty: 9 G | Refills: 0 | OUTPATIENT
Start: 2020-05-18

## 2020-05-18 RX ORDER — FOLIC ACID 1 MG/1
TABLET ORAL
Qty: 30 TABLET | Refills: 0 | OUTPATIENT
Start: 2020-05-18

## 2020-05-18 RX ORDER — METOPROLOL SUCCINATE 25 MG/1
TABLET, EXTENDED RELEASE ORAL
Qty: 45 TABLET | Refills: 0 | OUTPATIENT
Start: 2020-05-18

## 2020-05-22 ENCOUNTER — VIRTUAL VISIT (OUTPATIENT)
Dept: FAMILY MEDICINE CLINIC | Age: 30
End: 2020-05-22
Payer: MEDICARE

## 2020-05-22 VITALS — BODY MASS INDEX: 38.57 KG/M2 | WEIGHT: 240 LBS | HEIGHT: 66 IN

## 2020-05-22 PROBLEM — E66.01 MORBID OBESITY (HCC): Status: ACTIVE | Noted: 2020-05-22

## 2020-05-22 PROCEDURE — 99215 OFFICE O/P EST HI 40 MIN: CPT | Performed by: FAMILY MEDICINE

## 2020-05-22 PROCEDURE — G8427 DOCREV CUR MEDS BY ELIG CLIN: HCPCS | Performed by: FAMILY MEDICINE

## 2020-05-22 PROCEDURE — G8417 CALC BMI ABV UP PARAM F/U: HCPCS | Performed by: FAMILY MEDICINE

## 2020-05-22 PROCEDURE — 1036F TOBACCO NON-USER: CPT | Performed by: FAMILY MEDICINE

## 2020-05-22 RX ORDER — CHOLECALCIFEROL (VITAMIN D3) 125 MCG
CAPSULE ORAL
Qty: 90 TABLET | Refills: 1 | Status: SHIPPED
Start: 2020-05-22 | End: 2020-10-26

## 2020-05-22 RX ORDER — FOLIC ACID 1 MG/1
TABLET ORAL
Qty: 90 TABLET | Refills: 1 | Status: SHIPPED
Start: 2020-05-22 | End: 2020-10-26

## 2020-05-22 RX ORDER — METOPROLOL SUCCINATE 25 MG/1
TABLET, EXTENDED RELEASE ORAL
Qty: 45 TABLET | Refills: 1 | Status: SHIPPED
Start: 2020-05-22 | End: 2020-10-26

## 2020-05-22 RX ORDER — ALBUTEROL SULFATE 90 UG/1
2 AEROSOL, METERED RESPIRATORY (INHALATION) EVERY 6 HOURS PRN
Qty: 3 INHALER | Refills: 1 | Status: SHIPPED
Start: 2020-05-22 | End: 2021-04-01 | Stop reason: SDUPTHER

## 2020-05-22 RX ORDER — ATORVASTATIN CALCIUM 10 MG/1
TABLET, FILM COATED ORAL
Qty: 90 TABLET | Refills: 1 | Status: SHIPPED
Start: 2020-05-22 | End: 2020-12-09 | Stop reason: SDUPTHER

## 2020-05-22 ASSESSMENT — ENCOUNTER SYMPTOMS
SINUS PAIN: 0
WHEEZING: 0
STRIDOR: 0
EYE PAIN: 0
FACIAL SWELLING: 0
EYE DISCHARGE: 0
CONSTIPATION: 0
CHOKING: 0
CHEST TIGHTNESS: 0
ALLERGIC/IMMUNOLOGIC NEGATIVE: 1
RESPIRATORY NEGATIVE: 1
ANAL BLEEDING: 0
APNEA: 0
GASTROINTESTINAL NEGATIVE: 1
SHORTNESS OF BREATH: 0
BLURRED VISION: 0
RHINORRHEA: 0
BACK PAIN: 1
PHOTOPHOBIA: 0
RECTAL PAIN: 0
BLOOD IN STOOL: 0
ABDOMINAL DISTENTION: 0
COLOR CHANGE: 0
VOICE CHANGE: 0
TROUBLE SWALLOWING: 0
DIARRHEA: 0
ORTHOPNEA: 0
EYE REDNESS: 0
EYE ITCHING: 0

## 2020-05-22 ASSESSMENT — PATIENT HEALTH QUESTIONNAIRE - PHQ9
SUM OF ALL RESPONSES TO PHQ QUESTIONS 1-9: 0
SUM OF ALL RESPONSES TO PHQ QUESTIONS 1-9: 0
1. LITTLE INTEREST OR PLEASURE IN DOING THINGS: 0
2. FEELING DOWN, DEPRESSED OR HOPELESS: 0
SUM OF ALL RESPONSES TO PHQ9 QUESTIONS 1 & 2: 0

## 2020-05-22 NOTE — PROGRESS NOTES
as needed for Wheezing 3 Inhaler 1    fluticasone (FLONASE) 50 MCG/ACT nasal spray 1 spray by Nasal route daily 1 Bottle 3    topiramate (TOPAMAX) 100 MG tablet 100 mg 2 times daily   11    clonazePAM (KLONOPIN) 1 MG tablet Take 1 mg by mouth 2 times daily as needed. Pasty Shankar tiZANidine (ZANAFLEX) 2 MG tablet TAKE ONE TABLET BY MOUTH AT BEDTIME 90 tablet 1    pyridostigmine (MESTINON) 60 MG tablet Take 1 tablet by mouth 3 times daily 270 tablet 1    apixaban (ELIQUIS) 5 MG TABS tablet Take 5 mg by mouth 2 times daily      citalopram (CELEXA) 20 MG tablet Take 20 mg by mouth daily      acetaminophen (TYLENOL) 500 MG tablet Take 500 mg by mouth      hydrocortisone 2.5 % cream Apply topically 2 times daily. 1 Tube 3    [DISCONTINUED] folic acid (FOLVITE) 1 MG tablet TAKE 1 TABLET BY MOUTH ONCE DAILY - appointment required for further refills. 30 tablet 0    [DISCONTINUED] Cholecalciferol (VITAMIN D3) 50 MCG (2000 UT) TABS TAKE 1 TABLET BY MOUTH ONCE DAILY - appointment required for further refills. 30 tablet 0    [DISCONTINUED] metoprolol succinate (TOPROL XL) 25 MG extended release tablet Take 1/2 tablet by mouth daily - appointment required for further refills. 15 tablet 0    [DISCONTINUED] zolpidem (AMBIEN) 5 MG tablet Take 5 mg by mouth nightly as needed for Sleep.  [DISCONTINUED] atorvastatin (LIPITOR) 10 MG tablet TAKE ONE TABLET BY MOUTH ONCE DAILY 90 tablet 1    [DISCONTINUED] albuterol sulfate HFA (VENTOLIN HFA) 108 (90 Base) MCG/ACT inhaler Inhale 2 puffs into the lungs every 6 hours as needed for Wheezing 1 Inhaler 3     No facility-administered encounter medications on file as of 5/22/2020. Return in about 3 months (around 8/22/2020).         Reviewed recent labs related to Gertrude's current problems      Discussed importance of regular Health Maintenance follow up  Health Maintenance   Topic    Varicella vaccine (1 of 2 - 2-dose childhood series)    Pneumococcal 0-64 years Vaccine

## 2020-06-15 ENCOUNTER — HOSPITAL ENCOUNTER (OUTPATIENT)
Age: 30
Discharge: HOME | End: 2020-06-15
Payer: MEDICARE

## 2020-06-15 LAB
ALBUMIN SERPL-MCNC: 4.2 G/DL (ref 3.5–5.2)
ALP BLD-CCNC: 70 U/L (ref 35–104)
ALT SERPL-CCNC: 17 U/L (ref 0–32)
ANION GAP SERPL CALCULATED.3IONS-SCNC: 12 MMOL/L (ref 7–16)
AST SERPL-CCNC: 17 U/L (ref 0–31)
BASOPHILS ABSOLUTE: 0.06 E9/L (ref 0–0.2)
BASOPHILS RELATIVE PERCENT: 0.8 % (ref 0–2)
BILIRUB SERPL-MCNC: 0.3 MG/DL (ref 0–1.2)
BUN BLDV-MCNC: 8 MG/DL (ref 6–20)
CALCIUM SERPL-MCNC: 9.6 MG/DL (ref 8.6–10.2)
CHLORIDE BLD-SCNC: 107 MMOL/L (ref 98–107)
CHOLESTEROL, TOTAL: 162 MG/DL (ref 0–199)
CO2: 19 MMOL/L (ref 22–29)
CREAT SERPL-MCNC: 0.9 MG/DL (ref 0.5–1)
EOSINOPHILS ABSOLUTE: 0.31 E9/L (ref 0.05–0.5)
EOSINOPHILS RELATIVE PERCENT: 4.1 % (ref 0–6)
GFR AFRICAN AMERICAN: >60
GFR NON-AFRICAN AMERICAN: >60 ML/MIN/1.73
GLUCOSE BLD-MCNC: 106 MG/DL (ref 74–99)
HBA1C MFR BLD: 5.3 % (ref 4–5.6)
HCT VFR BLD CALC: 41.7 % (ref 34–48)
HDLC SERPL-MCNC: 38 MG/DL
HEMOGLOBIN: 13.7 G/DL (ref 11.5–15.5)
IMMATURE GRANULOCYTES #: 0.03 E9/L
IMMATURE GRANULOCYTES %: 0.4 % (ref 0–5)
LDL CHOLESTEROL CALCULATED: 73 MG/DL (ref 0–99)
LYMPHOCYTES ABSOLUTE: 2.04 E9/L (ref 1.5–4)
LYMPHOCYTES RELATIVE PERCENT: 26.8 % (ref 20–42)
MCH RBC QN AUTO: 31.4 PG (ref 26–35)
MCHC RBC AUTO-ENTMCNC: 32.9 % (ref 32–34.5)
MCV RBC AUTO: 95.4 FL (ref 80–99.9)
MONOCYTES ABSOLUTE: 0.63 E9/L (ref 0.1–0.95)
MONOCYTES RELATIVE PERCENT: 8.3 % (ref 2–12)
NEUTROPHILS ABSOLUTE: 4.55 E9/L (ref 1.8–7.3)
NEUTROPHILS RELATIVE PERCENT: 59.6 % (ref 43–80)
PDW BLD-RTO: 12.5 FL (ref 11.5–15)
PLATELET # BLD: 311 E9/L (ref 130–450)
PMV BLD AUTO: 9.6 FL (ref 7–12)
POTASSIUM SERPL-SCNC: 4 MMOL/L (ref 3.5–5)
RBC # BLD: 4.37 E12/L (ref 3.5–5.5)
SODIUM BLD-SCNC: 138 MMOL/L (ref 132–146)
TOTAL PROTEIN: 7.5 G/DL (ref 6.4–8.3)
TRIGL SERPL-MCNC: 256 MG/DL (ref 0–149)
TSH SERPL DL<=0.05 MIU/L-ACNC: 2.42 UIU/ML (ref 0.27–4.2)
URIC ACID, SERUM: 6.8 MG/DL (ref 2.4–5.7)
VLDLC SERPL CALC-MCNC: 51 MG/DL
WBC # BLD: 7.6 E9/L (ref 4.5–11.5)

## 2020-06-15 PROCEDURE — 83036 HEMOGLOBIN GLYCOSYLATED A1C: CPT

## 2020-06-15 PROCEDURE — 84550 ASSAY OF BLOOD/URIC ACID: CPT

## 2020-06-15 PROCEDURE — 36415 COLL VENOUS BLD VENIPUNCTURE: CPT

## 2020-06-15 PROCEDURE — 84443 ASSAY THYROID STIM HORMONE: CPT

## 2020-06-15 PROCEDURE — 80061 LIPID PANEL: CPT

## 2020-06-15 PROCEDURE — 80053 COMPREHEN METABOLIC PANEL: CPT

## 2020-06-15 PROCEDURE — 85025 COMPLETE CBC W/AUTO DIFF WBC: CPT

## 2020-11-30 RX ORDER — CHOLECALCIFEROL (VITAMIN D3) 50 MCG
TABLET ORAL
Qty: 30 TABLET | Refills: 0 | OUTPATIENT
Start: 2020-11-30

## 2020-11-30 RX ORDER — METOPROLOL SUCCINATE 25 MG/1
TABLET, EXTENDED RELEASE ORAL
Qty: 30 TABLET | Refills: 0 | OUTPATIENT
Start: 2020-11-30

## 2020-11-30 RX ORDER — FOLIC ACID 1 MG/1
TABLET ORAL
Qty: 30 TABLET | Refills: 0 | OUTPATIENT
Start: 2020-11-30

## 2020-12-02 RX ORDER — CHOLECALCIFEROL (VITAMIN D3) 50 MCG
TABLET ORAL
Qty: 30 TABLET | Refills: 0 | OUTPATIENT
Start: 2020-12-02

## 2020-12-02 RX ORDER — FOLIC ACID 1 MG/1
TABLET ORAL
Qty: 30 TABLET | Refills: 0 | OUTPATIENT
Start: 2020-12-02

## 2020-12-02 RX ORDER — METOPROLOL SUCCINATE 25 MG/1
TABLET, EXTENDED RELEASE ORAL
Qty: 30 TABLET | Refills: 0 | OUTPATIENT
Start: 2020-12-02

## 2020-12-02 NOTE — TELEPHONE ENCOUNTER
Pt called in she needs her folic acid, toprol and vitamin D3 refilled advised pt she needs to make appt she made a appt for 12/9 at 3:00 can we send a week supply into the pharmacy til her appt next wednes?  Pt aware she needs to make she comes to her appt

## 2020-12-03 RX ORDER — FOLIC ACID 1 MG/1
TABLET ORAL
Qty: 30 TABLET | Refills: 0 | Status: SHIPPED
Start: 2020-12-03 | End: 2020-12-09 | Stop reason: SDUPTHER

## 2020-12-03 RX ORDER — CHOLECALCIFEROL (VITAMIN D3) 125 MCG
CAPSULE ORAL
Qty: 30 TABLET | Refills: 0 | Status: SHIPPED
Start: 2020-12-03 | End: 2020-12-09 | Stop reason: SDUPTHER

## 2020-12-03 RX ORDER — METOPROLOL SUCCINATE 25 MG/1
TABLET, EXTENDED RELEASE ORAL
Qty: 30 TABLET | Refills: 0 | Status: SHIPPED
Start: 2020-12-03 | End: 2020-12-09 | Stop reason: SDUPTHER

## 2020-12-09 ENCOUNTER — OFFICE VISIT (OUTPATIENT)
Dept: FAMILY MEDICINE CLINIC | Age: 30
End: 2020-12-09
Payer: MEDICARE

## 2020-12-09 VITALS
WEIGHT: 247 LBS | SYSTOLIC BLOOD PRESSURE: 122 MMHG | TEMPERATURE: 98.1 F | RESPIRATION RATE: 18 BRPM | DIASTOLIC BLOOD PRESSURE: 80 MMHG | BODY MASS INDEX: 39.7 KG/M2 | OXYGEN SATURATION: 98 % | HEART RATE: 77 BPM | HEIGHT: 66 IN

## 2020-12-09 PROCEDURE — G0439 PPPS, SUBSEQ VISIT: HCPCS | Performed by: FAMILY MEDICINE

## 2020-12-09 PROCEDURE — G0008 ADMIN INFLUENZA VIRUS VAC: HCPCS | Performed by: FAMILY MEDICINE

## 2020-12-09 PROCEDURE — 90686 IIV4 VACC NO PRSV 0.5 ML IM: CPT | Performed by: FAMILY MEDICINE

## 2020-12-09 PROCEDURE — G8482 FLU IMMUNIZE ORDER/ADMIN: HCPCS | Performed by: FAMILY MEDICINE

## 2020-12-09 RX ORDER — ATORVASTATIN CALCIUM 10 MG/1
TABLET, FILM COATED ORAL
Qty: 90 TABLET | Refills: 1 | Status: SHIPPED
Start: 2020-12-09 | End: 2021-04-01 | Stop reason: SDUPTHER

## 2020-12-09 RX ORDER — METOPROLOL SUCCINATE 25 MG/1
12.5 TABLET, EXTENDED RELEASE ORAL DAILY
Qty: 45 TABLET | Refills: 1 | Status: SHIPPED
Start: 2020-12-09 | End: 2021-04-01 | Stop reason: SDUPTHER

## 2020-12-09 RX ORDER — CHOLECALCIFEROL (VITAMIN D3) 125 MCG
CAPSULE ORAL
Qty: 90 TABLET | Refills: 1 | Status: SHIPPED
Start: 2020-12-09 | End: 2021-04-01 | Stop reason: SDUPTHER

## 2020-12-09 RX ORDER — FOLIC ACID 1 MG/1
TABLET ORAL
Qty: 90 TABLET | Refills: 1 | Status: SHIPPED
Start: 2020-12-09 | End: 2021-04-01 | Stop reason: SDUPTHER

## 2020-12-09 ASSESSMENT — PATIENT HEALTH QUESTIONNAIRE - PHQ9
SUM OF ALL RESPONSES TO PHQ9 QUESTIONS 1 & 2: 0
SUM OF ALL RESPONSES TO PHQ QUESTIONS 1-9: 0
SUM OF ALL RESPONSES TO PHQ QUESTIONS 1-9: 0
2. FEELING DOWN, DEPRESSED OR HOPELESS: 0
1. LITTLE INTEREST OR PLEASURE IN DOING THINGS: 0
SUM OF ALL RESPONSES TO PHQ QUESTIONS 1-9: 0

## 2020-12-09 ASSESSMENT — LIFESTYLE VARIABLES: HOW OFTEN DO YOU HAVE A DRINK CONTAINING ALCOHOL: 0

## 2020-12-09 NOTE — PROGRESS NOTES
Medicare Annual Wellness Visit  Name: Tiana Cancino Date: 2020   MRN: [id number] Sex: Female   Age: 34 y.o. Ethnicity: Non-/Non    : 1990 Race: Yandel Harkins is here for Medicare AWV (AWV) and Medication Refill (Pharmacy confirmed, meds pended)    Screenings for behavioral, psychosocial and functional/safety risks, and cognitive dysfunction are all negative except as indicated below. These results, as well as other patient data from the [address] form, are documented in Flowsheets linked to this Encounter. Allergies   Allergen Reactions    Latex     Latex Dermatitis    Prednisone     Prednisone Other (See Comments)     Becomes aggressive per mother    Prozac [Fluoxetine Hcl] Other (See Comments)     Suicidal thoughts         Prior to Visit Medications    Medication Sig Taking? Authorizing Provider   atorvastatin (LIPITOR) 10 MG tablet TAKE ONE TABLET BY MOUTH ONCE DAILY Yes Moody Ramos DO   folic acid (FOLVITE) 1 MG tablet TAKE 1 TABLET BY MOUTH ONCE DAILY Yes Gage Ramos DO   metoprolol succinate (TOPROL XL) 25 MG extended release tablet Take 0.5 tablets by mouth daily Yes Moody Ramos DO   Cholecalciferol (VITAMIN D3) 50 MCG (2000 UT) TABS Take one tablet daily Yes Moody Ramos DO   albuterol sulfate HFA (VENTOLIN HFA) 108 (90 Base) MCG/ACT inhaler Inhale 2 puffs into the lungs every 6 hours as needed for Wheezing Yes Moody Ramos DO   fluticasone (FLONASE) 50 MCG/ACT nasal spray 1 spray by Nasal route daily Yes Moody Ramos DO   topiramate (TOPAMAX) 100 MG tablet 100 mg 2 times daily  Yes Historical Provider, MD   clonazePAM (KLONOPIN) 1 MG tablet Take 1 mg by mouth 2 times daily as needed. . Yes Historical Provider, MD   tiZANidine (ZANAFLEX) 2 MG tablet TAKE ONE TABLET BY MOUTH AT BEDTIME Yes Moody Ramos DO   pyridostigmine (MESTINON) 60 MG tablet Take 1 tablet by mouth 3 6\" (1.676 m)     Body mass index is 39.87 kg/m². Based upon direct observation of the patient, evaluation of cognition reveals recent and remote memory intact. General Appearance: alert and oriented to person, place and time, well-developed and well-nourished, in no acute distress  Skin: warm and dry, no rash or erythema  Head: normocephalic and atraumatic  Eyes: pupils equal, round, and reactive to light, extraocular eye movements intact, conjunctivae normal  ENT: tympanic membrane, external ear and ear canal normal bilaterally, oropharynx clear and moist with normal mucous membranes and hearing grossly normal bilaterally  Neck: neck supple and non tender without mass, no thyromegaly or thyroid nodules, no cervical lymphadenopathy   Pulmonary/Chest: clear to auscultation bilaterally- no wheezes, rales or rhonchi, normal air movement, no respiratory distress and no chest wall tenderness  Cardiovascular: normal rate, regular rhythm, normal S1 and S2, no murmurs, no gallops, intact distal pulses and no carotid bruits  Abdomen: soft, non-tender, non-distended, normal bowel sounds, no masses or organomegaly  Extremities: no cyanosis, no clubbing and brace to her lower left leg for foot drop  Musculoskeletal: weak left side  Neurologic: left arm and left leg are weak     Patient's complete Health Risk Assessment and screening values have been reviewed and are found in Flowsheets. The following problems were reviewed today and where indicated follow up appointments were made and/or referrals ordered. Positive Risk Factor Screenings with Interventions:     Fall Risk:  Timed Up and Go Test > 12 seconds?  (Complete if either Fall Risk answers are Yes): no  2 or more falls in past year?: (!) yes  Fall with injury in past year?: no  Fall Risk Interventions:    · Home safety tips provided    General Health and ACP:  General  In general, how would you say your health is?: Good  In the past 7 days, have you experienced any of the following?  New or Increased Pain, New or Increased Fatigue, Loneliness, Social Isolation, Stress or Anger?: None of These  Do you get the social and emotional support that you need?: Yes  Do you have a Living Will?: (!) No  Advance Directives     Power of  Living Will ACP-Advance Directive ACP-Power of     Not on File [place] on 02/05/16 [address] [facility] Risk Interventions:  · she feels much better than she did but her left leg still troubles her    Health Habits/Nutrition:  Health Habits/Nutrition  Do you exercise for at least 20 minutes 2-3 times per week?: Yes  Have you lost any weight without trying in the past 3 months?: No  Do you eat fewer than 2 meals per day?: No  Have you seen a dentist within the past year?: Yes  Body mass index: (!) 39.86  Health Habits/Nutrition Interventions:  · no acute issues    Personalized Preventive Plan   Current Health Maintenance Status  Immunization History   Administered Date(s) Administered    Influenza Vaccine, unspecified formulation 12/03/2015    Influenza, Intradermal, Preservative free 01/12/2018    Influenza, Quadv, IM, PF (6 mo and older Fluzone, Flulaval, Fluarix, and 3 yrs and older Afluria) 10/04/2016, 01/21/2019, 10/23/2019    Rabies 06/23/2015, 06/26/2015, 06/30/2015, 07/07/2015    Rabies Immune Globulin 06/23/2015    Td, unspecified formulation 06/23/2015        Health Maintenance   Topic Date Due    Varicella vaccine (1 of 2 - 2-dose childhood series) 12/15/1991    HIV screen  12/15/2005    DTaP/Tdap/Td vaccine (1 - Tdap) 12/15/2009    Cervical cancer screen  09/01/2014    Annual Wellness Visit (AWV)  05/29/2019    Flu vaccine (1) 09/01/2020    Lipid screen  06/15/2021    Hepatitis A vaccine  Aged Out    Hepatitis B vaccine  Aged Out    Hib vaccine  Aged Out    Meningococcal (ACWY) vaccine  Aged Out    Pneumococcal 0-64 years Vaccine  Aged Out     Recommendations for Aquiris Due: see orders and patient instructions/AVS.  . Recommended screening schedule for the next 5-10 years is provided to the patient in written form: see Patient Instructions/AVS.    [facility] was seen today for medicare awv and medication refill. Diagnoses and all orders for this visit:    Encounter for Medicare annual wellness exam    ---VASCULAR PANEL  A) asa, plavix, aggrenox  B) ELIQUIS, pletal, tzd, STATIN  C) ace, hctz, FOLIC, ccb  D) cannikinumab, fish oils     ---CARDIAC---ELIQUIS, ace, BETA, STATIN, hctz, ( ccb )    Cough  -     Basic Metabolic Panel; Future  -     CBC Auto Differential; Future    Dyslipidemia  -     atorvastatin (LIPITOR) 10 MG tablet; TAKE ONE TABLET BY MOUTH ONCE DAILY  -     Basic Metabolic Panel; Future  -     Lipid Panel; Future  -     CBC Auto Differential; Future    Cerebral artery occlusion with cerebral infarction (HCC)  -     folic acid (FOLVITE) 1 MG tablet; TAKE 1 TABLET BY MOUTH ONCE DAILY  -     Basic Metabolic Panel; Future  -     CBC Auto Differential; Future    Essential hypertension  -     metoprolol succinate (TOPROL XL) 25 MG extended release tablet; Take 0.5 tablets by mouth daily  -     Basic Metabolic Panel; Future  -     CBC Auto Differential; Future    Low vitamin D level  -     Cholecalciferol (VITAMIN D3) 50 MCG (2000 UT) TABS; Take one tablet daily  -     Basic Metabolic Panel; Future  -     CBC Auto Differential; Future    IFG (impaired fasting glucose)  -     Basic Metabolic Panel; Future  -     CBC Auto Differential; Future  -     Hemoglobin A1C; Future    Fatigue, unspecified type  -     TSH without Reflex; Future  -     Uric Acid; Future  -     Basic Metabolic Panel;  Future  -     CBC Auto Differential; Future    Needs flu shot  -     INFLUENZA, QUADV, 3 YRS AND OLDER, IM PF, PREFILL SYR OR SDV, 0.5ML (AFLURIA QUADV, PF)

## 2020-12-09 NOTE — PATIENT INSTRUCTIONS
Personalized Preventive Plan for [place] - 12/9/2020  Medicare offers a range of preventive health benefits. Some of the tests and screenings are paid in full while other may be subject to a deductible, co-insurance, and/or copay. Some of these benefits include a comprehensive review of your medical history including lifestyle, illnesses that may run in your family, and various assessments and screenings as appropriate. After reviewing your medical record and screening and assessments performed today your provider may have ordered immunizations, labs, imaging, and/or referrals for you. A list of these orders (if applicable) as well as your Preventive Care list are included within your After Visit Summary for your review. Other Preventive Recommendations:    · A preventive eye exam performed by an eye specialist is recommended every 1-2 years to screen for glaucoma; cataracts, macular degeneration, and other eye disorders. · A preventive dental visit is recommended every 6 months. · Try to get at least 150 minutes of exercise per week or 10,000 steps per day on a pedometer . · Order or download the FREE \"Exercise & Physical Activity: Your Everyday Guide\" from [employer]. Call 4-181.553.2381 or search [employer] online. · You need 5329-1977 mg of calcium and 3418-3570 IU of vitamin D per day. It is possible to meet your calcium requirement with diet alone, but a vitamin D supplement is usually necessary to meet this goal.  · When exposed to the sun, use a sunscreen that protects against both UVA and UVB radiation with an SPF of 30 or greater. Reapply every 2 to 3 hours or after sweating, drying off with a towel, or swimming. · Always wear a seat belt when traveling in a car. Always wear a helmet when riding a bicycle or motorcycle.

## 2021-01-08 ENCOUNTER — TELEPHONE (OUTPATIENT)
Dept: FAMILY MEDICINE CLINIC | Age: 31
End: 2021-01-08

## 2021-01-08 DIAGNOSIS — Z86.718 H/O DEEP VENOUS THROMBOSIS: ICD-10-CM

## 2021-01-08 DIAGNOSIS — M79.605 PAIN OF LEFT LOWER EXTREMITY: Primary | ICD-10-CM

## 2021-01-08 NOTE — TELEPHONE ENCOUNTER
This MA spoke with pt - advised pt US order is placed. Pt verbalized she understood.     Electronically signed by Levar Benitez MA on 1/8/21 at 3:58 PM EST

## 2021-02-04 NOTE — PROGRESS NOTES
[facility]. Haris Reid M.D., F.A.C.P. Leona Jarrell, FRANCISCO, APRN, CNS  Marisela Sims. Saleem Salgado, MSN, APRN-FNP-C  Therese Pierce MSN, APRN, FNP-C  Antoine LONG, PEPE  [address] MSN, APRN, FNP-C  [address], [address]  [place], [address] [address]  Phone: [phone]  Fax: [phone]       Rickie Rojas is a 27 y.o. right handed female     Past Medical History:     Past Medical History:   Diagnosis Date    Anxiety     Asthma     Blood clot embolism during pregnancy, antepartum     Cerebrovascular [facility] (CVA) [facility] to vascular occlusion [facility]     Chronic migraine     Coagulopathy [facility] [facility]     DVT of leg (deep venous thrombosis) ([facility] [address] [facility]     left    DVT of upper extremity (deep vein thrombosis) ([facility] [address] [facility]     Left X 2    Heterozygous for BERT-1 4G allele     Low back pain     MTHFR mutation ([facility] [address] [facility]     POTS (postural orthostatic tachycardia syndrome)     Shingles      Past Surgical History:       Past Surgical History:   Procedure Laterality Date    DILATION AND CURETTAGE OF UTERUS      NERVE BLOCK Left 08/02/2018    transforaminal epidural #1    NM ZEHRA NOSE/CLEFT LIP/TIP Left 08/02/2018    LEFT L5-S1 TRANSFORAMINAL EPIDURAL STEROID INJECTION #1 performed by Herb Medrano DO at [facility] Right      Allergies:       Latex, Latex, Prednisone, Prednisone, and Prozac [fluoxetine hcl]    Medications:     Current Outpatient Medications   Medication Sig Dispense Refill    zolpidem (AMBIEN) 10 MG tablet Take 1 tablet by mouth nightly.       atorvastatin (LIPITOR) 10 MG tablet TAKE ONE TABLET BY MOUTH ONCE DAILY 90 tablet 1    folic acid (FOLVITE) 1 MG tablet TAKE 1 TABLET BY MOUTH ONCE DAILY 90 tablet 1    metoprolol succinate (TOPROL XL) 25 MG extended release tablet Take 0.5 tablets by mouth daily 45 tablet 1    Cholecalciferol (VITAMIN D3) 50 MCG (2000 UT) TABS Take one tablet daily 90 tablet 1    albuterol sulfate HFA (VENTOLIN HFA) 108 (90 Base) MCG/ACT inhaler Inhale 2 puffs into the lungs every 6 hours as needed for Wheezing 3 Inhaler 1    fluticasone (FLONASE) 50 MCG/ACT nasal spray 1 spray by Nasal route daily (Patient taking differently: 1 spray by Nasal route daily as needed ) 1 Bottle 3    topiramate (TOPAMAX) 100 MG tablet 100 mg 2 times daily   11    clonazePAM (KLONOPIN) 1 MG tablet Take 1 mg by mouth 2 times daily.  tiZANidine (ZANAFLEX) 2 MG tablet TAKE ONE TABLET BY MOUTH AT BEDTIME 90 tablet 1    pyridostigmine (MESTINON) 60 MG tablet Take 1 tablet by mouth 3 times daily 270 tablet 1    apixaban (ELIQUIS) 5 MG TABS tablet Take 5 mg by mouth 2 times daily      citalopram (CELEXA) 20 MG tablet Take 20 mg by mouth daily      acetaminophen (TYLENOL) 500 MG tablet Take 500 mg by mouth every 6 hours as needed       hydrocortisone 2.5 % cream Apply topically 2 times daily. (Patient taking differently: daily as needed ) 1 Tube 3     No current facility-administered medications for this visit. Social History:       She is  with no children and does not currently work    She is a current smoker--a few cigarettes per day; no history of alcohol or illicit drugs    Review of Systems:     No chest pain or palpitations  No SOB  No vertigo, lightheadedness or loss of consciousness  No falls, tripping or stumbling  No incontinence of bowels or bladder  No itching or bruising appreciated  +L arm and leg numbness    ROS is otherwise negative    Family History:     Family History   Problem Relation Age of Onset    Diabetes Father     Deep Vein Thrombosis Maternal Grandmother     Substance Abuse Brother     Stroke Paternal Uncle      History of Present Illness:      The patient was referred by Dr. 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 She was smoking more at that time and on birth control. She passed out at work and, when she awoke, she could not move her left side. She went to the hospital and was found to be suffering from a stroke--she had a complicated hospital course and developed DVTs in her left arm and leg. Subsequently she was found eventually discovered to have a coagulopathy and has been on Eliquis. She follows at the [facility]. Her arm apparently recovered well, but she has residual L leg spasticity. She is wearing an orthotic that is from 2005, and ill fitting. She suffers with dysesthesias in her left lower extremity and was previously following at [facility]-- and placed on topiramate for these pains, as well as for chronic migraines. Gabapentin was reportedly deferred due to worries for weight gain. No images to review from 2005, but no areas of gliosis or encephalomalacia on MRI brain from 2015. She was also diagnosed with POTS and was following with a neurologist at [facility] who also specializes in POTS. She then followed with Dr. Vibha Fregoso. She is on low-dose metoprolol as well as pyridostigmine for the POTS. She does not wear compression stockings but drinks adequate amounts of fluids. She has suffered with chronic migraines as described below    Description of Headaches:  Location of pain: bilateral, temporal, apical  Radiation of pain?:bilateral, apical, retro-orbital  Character of pain:pulsating and throbbing  Severity of pain: 10  Accompanying symptoms: nausea, sonophobia, photophobia, hypotension, tachycardia  Aura: black spots in vision, losing spots of time  Prodromal sx?: none identified  Rapidity of onset: gradual  Typical duration of individual headache: 2 days  Are most headaches similar in presentation? yes  Aggravating factors: bending. moving  Typical precipitants: flashing lights, flashes on visualized  Neck: no carotid bruit--full ROM without cervicalgia  Back: symmetric, no curvature.  ROM normal.   Lungs: clear to auscultation bilaterally  Heart: regular rate and rhythm--no murmur  Abdomen: soft, non-tender; bowel sounds normal; no masses,  no organomegaly  Extremities: L AFO; atraumatic, no cyanosis or edema  Pulses: 2+ and symmetric  Skin:  color, texture, turgor normal--no rashes or lesions      Mental Status: alert and oriented x 4    Appropriate attention/concentration  Intact fundus of knowledge  Memories intact    Speech: no dysarthria  Language: no aphasias    Cranial Nerves:  I: smell    II: visual acuity     II: visual fields Full    II: pupils MILADIS   III,VII: ptosis None   III,IV,VI: extraocular muscles  EOMI without nystagmus   V: mastication Normal   V: facial light touch sensation  Normal   V,VII: corneal reflex     VII: facial muscle function - upper  Normal   VII: facial muscle function - lower Normal   VIII: hearing Normal   IX: soft palate elevation  Normal   IX,X: gag reflex    XI: trapezius strength  5/5   XI: sternocleidomastoid strength 5/5   XI: neck extension strength  5/5   XII: tongue strength  Normal     Motor:  5/5 BUE--slight LUE drift but no pronation (suspect functional)  5/5 RLE; Sudden giving way LLE  Normal bulk; increased tone LLE  No abnormal movements    Sensory:  LT decreased LUE and LLE   Vibration impaired LLE    Coordination:   FN, FFM normal on R  Functional ataxia with FN testing on L    Gait:  Limps on L    DTR:   Right Brachioradialis reflex 2+  Left Brachioradialis reflex 2+  Right Biceps reflex 2+  Left Biceps reflex 2+  Right Triceps reflex 2+  Left Triceps reflex 2+  Right Quadriceps reflex 2+  Left Quadriceps reflex 2+  Right Achilles reflex 2+  Left Achilles reflex NOT DONE--AFO    No Irving's    No other pathological reflexes    Laboratory/Radiology:  ry/Radiology:     CBC with Differential:    Lab Results   Component Value Date    WBC 7.6 and control her vascular risk factors to prevent future events. Chronic migraines with aura: Over 15 HA days per month on topiramate and SSRI. Topiramate is also causing unpleasant side effects and we will start to wean. Limited ability to use or add other first line agents as explained above. She would benefit from CGRP receptor inhibition, which also has a safer side effect profile in light of her multiple comorbidities. She is not a candidate for triptan therapy with her history of stroke, and we will start oral gepant for HA rescue. POTS: May need to establish with EP as I do not typically manage this condition. However, could consider maximizing beta-blocker and discontinuing Mestinon to reduce pill burden. SADIA hose and continuing to maximize fluid intake are also other interventions. Hypercoagulopathy: on [facility] and follows with [facility]    Plan:     Continue OAC/statin    Start Emgality--loading dose sample given    Nurtec PRN--samples given    Decrease topiramate 150 mg nightly    She must stop smoking    HA diary    Stroke s/s reviewed    Akila YOST    The patient is diagnosed with stroke. Patient is in need of a left custom KAFO to offset weakness, joint instability/malalignment, associated pain and proprioceptive and tactile feedback. The custom orthosis will provide multi-plane joint control of the ankle and foot. The custom orthosis will be needed more than six months for ambulation and will prevent tissue injury. The orthosis will improve the patients gait, limb alignment, balance, and risk of falling. This will enable the patient to safely and effectively achieve an increased level of physical activity, leading to an overall reduction in pain and improved quality of life.     RTO in 2 months or sooner PRN    DOUGLAS Castellano  4:42 PM  2/4/2021    I spent 50 minutes with this patient obtaining the HPI and discussing the exam with greater than 50% of the time providing counseling and education on medications and other treatment plans. All questions were answered prior to leaving my office.

## 2021-02-05 ENCOUNTER — OFFICE VISIT (OUTPATIENT)
Dept: NEUROLOGY | Age: 31
End: 2021-02-05
Payer: MEDICARE

## 2021-02-05 VITALS
TEMPERATURE: 97.9 F | SYSTOLIC BLOOD PRESSURE: 107 MMHG | DIASTOLIC BLOOD PRESSURE: 73 MMHG | BODY MASS INDEX: 38.86 KG/M2 | HEART RATE: 82 BPM | OXYGEN SATURATION: 96 % | WEIGHT: 241.8 LBS | RESPIRATION RATE: 12 BRPM | HEIGHT: 66 IN

## 2021-02-05 DIAGNOSIS — G43.109 CHRONIC MIGRAINE WITH AURA: ICD-10-CM

## 2021-02-05 DIAGNOSIS — I69.30 PERSONAL HISTORY OF STROKE WITH RESIDUAL EFFECTS: Primary | ICD-10-CM

## 2021-02-05 PROBLEM — S06.0X1A CONCUSSION WITH LOSS OF CONSCIOUSNESS OF 30 MINUTES OR LESS: Status: RESOLVED | Noted: 2017-06-25 | Resolved: 2021-02-05

## 2021-02-05 PROBLEM — B02.9 HERPES ZOSTER WITHOUT COMPLICATION: Status: RESOLVED | Noted: 2017-08-25 | Resolved: 2021-02-05

## 2021-02-05 PROBLEM — S09.90XA HEAD INJURY: Status: RESOLVED | Noted: 2017-06-25 | Resolved: 2021-02-05

## 2021-02-05 PROCEDURE — G8482 FLU IMMUNIZE ORDER/ADMIN: HCPCS | Performed by: NURSE PRACTITIONER

## 2021-02-05 PROCEDURE — G8427 DOCREV CUR MEDS BY ELIG CLIN: HCPCS | Performed by: NURSE PRACTITIONER

## 2021-02-05 PROCEDURE — 4004F PT TOBACCO SCREEN RCVD TLK: CPT | Performed by: NURSE PRACTITIONER

## 2021-02-05 PROCEDURE — 99205 OFFICE O/P NEW HI 60 MIN: CPT | Performed by: NURSE PRACTITIONER

## 2021-02-05 PROCEDURE — G8417 CALC BMI ABV UP PARAM F/U: HCPCS | Performed by: NURSE PRACTITIONER

## 2021-02-05 RX ORDER — GALCANEZUMAB 120 MG/ML
INJECTION, SOLUTION SUBCUTANEOUS
Qty: 3 PEN | Refills: 3 | Status: SHIPPED
Start: 2021-02-04 | End: 2021-03-01

## 2021-02-05 RX ORDER — TOPIRAMATE 100 MG/1
150 TABLET, FILM COATED ORAL NIGHTLY
Qty: 60 TABLET | Refills: 11
Start: 2021-02-05 | End: 2021-04-30 | Stop reason: SDUPTHER

## 2021-02-05 RX ORDER — ZOLPIDEM TARTRATE 10 MG/1
1 TABLET ORAL NIGHTLY
COMMUNITY
Start: 2021-02-01 | End: 2021-06-18 | Stop reason: ALTCHOICE

## 2021-02-05 RX ORDER — GALCANEZUMAB 120 MG/ML
INJECTION, SOLUTION SUBCUTANEOUS
Qty: 2 PEN | Refills: 0 | COMMUNITY
Start: 2021-02-05 | End: 2021-03-01

## 2021-02-05 SDOH — HEALTH STABILITY: MENTAL HEALTH: HOW MANY STANDARD DRINKS CONTAINING ALCOHOL DO YOU HAVE ON A TYPICAL DAY?: NOT ASKED

## 2021-02-05 SDOH — HEALTH STABILITY: MENTAL HEALTH: HOW OFTEN DO YOU HAVE A DRINK CONTAINING ALCOHOL?: NOT ASKED

## 2021-02-05 NOTE — PATIENT INSTRUCTIONS
Patient Education        galcanezumab  Pronunciation:  GAL ka ANTONIA ue mab  Brand:  Emgality Prefilled Pen, Emgality Prefilled Syringe  What is the most important information I should know about galcanezumab? Follow all directions on your medicine label and package. Tell each of your healthcare providers about all your medical conditions, allergies, and all medicines you use. What is galcanezumab? [place] Bathe is used to prevent migraine headaches in adults. [place] Bathe is also used to treat cluster headache episodes in adults. [place] Bathe may also be used for purposes not listed in this medication guide. What should I discuss with my healthcare provider before using galcanezumab? You should not use galcanezumab if you are allergic to it. Bush[place] Bathe is not approved for use by anyone younger than 25years old. Tell your doctor if you are pregnant. It is not known whether this medicine will harm an unborn baby. However, having migraine headaches during pregnancy may cause preeclampsia (dangerously high blood pressure that can lead to medical problems in both mother and baby). The benefit of preventing migraines may outweigh any risks to the baby. It may not be safe to breast-feed while using this medicine. Ask your doctor about any risk. How should I use galcanezumab? Follow all directions on your prescription label and read all medication guides or instruction sheets. Use the medicine exactly as directed. [place] Bathe is injected under the skin. A healthcare provider may teach you how to properly use the medication by yourself. For prevention of migraine headaches:  Bushwood Bathe is usually given as a first dose of 2 injections, followed by 1 injection once per month. For treatment of a cluster headache episodes:  Bushwood Bathe is usually given as 3 injections at the start of the cluster period, followed by 1 injection once per month until the end of the cluster period. Follow your doctor's dosing instructions very carefully. Read and carefully follow any Instructions for Use provided with your medicine. Ask your doctor or pharmacist if you don't understand all instructions. Prepare an injection only when you are ready to give it. Do not use if the medicine looks cloudy, has changed colors, or has particles in it. Call your pharmacist for new medicine. Store galcanezumab in its original carton in the refrigerator, protected from light. Do not freeze or shake the medicine. You may briefly store galcanezumab at room temperature. Protect from light and use the medicine within 7 days. Do not put it back in the refrigerator. Take the medicine out of the refrigerator and let it reach room temperature for 30 minutes before injecting your dose. Do not warm the medicine with hot water, sunlight, or a microwave. Each prefilled syringe or pen is for one use only. Throw it away after one use, even if there is still medicine left inside. What happens if I miss a dose? Use the medicine as soon as you remember, and then restart your regular injection schedule 1 month later. Do not use two doses at one time to make up a missed dose. What happens if I overdose? Seek emergency medical attention or call the Poison Help line at 1-763.780.3396. What should I avoid while using galcanezumab? Follow your doctor's instructions about any restrictions on food, beverages, or activity. What are the possible side effects of galcanezumab? Get emergency medical help if you have signs of an allergic reaction: hives; difficult breathing; swelling of your face, lips, tongue, or throat. An allergic reaction to galcanezumab can occur several days after an injection. Common side effects may include:  · pain, redness, itching, or irritation where the medicine was injected. This is not a complete list of side effects and others may occur. Call your doctor for medical advice about side effects. You may report side effects to FDA at 2-002-HIE-2992. What other drugs will affect galcanezumab? Other drugs may affect galcanezumab, including prescription and over-the-counter medicines, vitamins, and herbal products. Tell your doctor about all your current medicines and any medicine you start or stop using. Where can I get more information? Your pharmacist can provide more information about galcanezumab. Remember, keep this and all other medicines out of the reach of children, never share your medicines with others, and use this medication only for the indication prescribed. Every effort has been made to ensure that the information provided by [address] is accurate, up-to-date, and complete, but no guarantee is made to that effect. Drug information contained herein may be time sensitive. [facility] information has been compiled for use by healthcare practitioners and consumers in the [place] and therefore [facility] does not warrant that uses outside of the [place] are appropriate, unless specifically indicated otherwise. [facility]'s drug information does not endorse drugs, diagnose patients or recommend therapy. [facility]'s drug information is an informational resource designed to assist licensed healthcare practitioners in caring for their patients and/or to serve consumers viewing this service as a supplement to, and not a substitute for, the expertise, skill, knowledge and judgment of healthcare practitioners. The absence of a warning for a given drug or drug combination in no way should be construed to indicate that the drug or drug combination is safe, effective or appropriate for any given patient. [facility] does not assume any responsibility for any aspect of healthcare administered with the aid of information [facility] provides. The information contained herein is not intended to cover all possible uses, directions, precautions, warnings, drug interactions, allergic reactions, or adverse effects. If you have questions about the drugs you are taking, check with your doctor, nurse or pharmacist.  Copyright 0881-6654 [address]: 2.01. Revision date: 6/13/2019. Care instructions adapted under license by [facility] ([facility]). If you have questions about a medical condition or this instruction, always ask your healthcare professional. [place] [address] any warranty or liability for your use of this information.        Patient Education        rimegepant  Pronunciation:  sera amaya  Brand:  Nurtec ODT What is the most important information I should know about rimegepant? Follow all directions on your medicine label and package. Tell each of your healthcare providers about all your medical conditions, allergies, and all medicines you use. What is rimegepant? Rimegepant is used in adults to treat a migraine headache with or without aura. Rimegepant will not prevent  migraines. Rimegepant may also be used for purposes not listed in this medication guide. What should I discuss with my healthcare provider before taking rimegepant? You should not use rimegepant if you are allergic to it. Tell your doctor if you have ever had:  · liver disease; or  · kidney disease (or if you are on dialysis). It is not known whether rimegepant will harm an unborn baby. However, having migraine headaches may cause complications during pregnancy such as dangerously high blood pressure that can lead to medical problems in both mother and baby. The benefit of treating migraines may outweigh any risks to the baby. It may not be safe to breastfeed while using this medicine. Ask your doctor about any risk. Rimegepant is not approved for use by anyone younger than 25years old. How should I take rimegepant? Follow all directions on your prescription label and read all medication guides or instruction sheets. Use the medicine exactly as directed. Call your doctor if your symptoms do not improve, or if you have more than 15 headaches in one month (30 days). Remove an orally disintegrating tablet from the package only when you are ready to take the medicine. Place the tablet on or under your tongue and allow it to dissolve, without chewing. Swallow several times as the tablet dissolves. Store at cool room temperature, away from moisture and heat. What happens if I miss a dose? Since rimegepant is used when needed, it does not have a daily dosing schedule. Call your doctor if your symptoms do not improve after using this medicine. What happens if I overdose? Seek emergency medical attention or call the Poison Help line at 1-383.947.9895. What should I avoid while taking rimegepant? Follow your doctor's instructions about any restrictions on food, beverages, or activity. What are the possible side effects of rimegepant? Get emergency medical help if you have signs of an allergic reaction: hives, rash; difficult breathing; swelling of your face, lips, tongue, or throat. An allergic reaction can be severe, and may occur days after you have taken rimegepant. Common side effects may include nausea. This is not a complete list of side effects and others may occur. Call your doctor for medical advice about side effects. You may report side effects to FDA at 3-079-ZOC-5157. What other drugs will affect rimegepant? Sometimes it is not safe to use certain medications at the same time. Some drugs can affect your blood levels of other drugs you take, which may increase side effects or make the medications less effective. If you use any of the following medicines, avoid taking rimegepant within 48 hours after you last took the other medicine:  · aprepitant;  · erythromycin;  · fluconazole; or  · heart or blood pressure medicine --diltiazem, verapamil. This list is not complete and many other drugs may affect rimegepant. This includes prescription and over-the-counter medicines, vitamins, and herbal products. Not all possible drug interactions are listed here. Where can I get more information? Your pharmacist can provide more information about rimegepant. Remember, keep this and all other medicines out of the reach of children, never share your medicines with others, and use this medication only for the indication prescribed. Every effort has been made to ensure that the information provided by [address] is accurate, up-to-date, and complete, but no guarantee is made to that effect. Drug information contained herein may be time sensitive. [facility] information has been compiled for use by healthcare practitioners and consumers in the [place] and therefore [facility] does not warrant that uses outside of the [place] are appropriate, unless specifically indicated otherwise. [facility]'s drug information does not endorse drugs, diagnose patients or recommend therapy. [facility]'s drug information is an informational resource designed to assist licensed healthcare practitioners in caring for their patients and/or to serve consumers viewing this service as a supplement to, and not a substitute for, the expertise, skill, knowledge and judgment of healthcare practitioners. The absence of a warning for a given drug or drug combination in no way should be construed to indicate that the drug or drug combination is safe, effective or appropriate for any given patient. [facility] does not assume any responsibility for any aspect of healthcare administered with the aid of information [facility] provides. The information contained herein is not intended to cover all possible uses, directions, precautions, warnings, drug interactions, allergic reactions, or adverse effects. If you have questions about the drugs you are taking, check with your doctor, nurse or pharmacist.  Copyright 6912-7918 [address] Version: 1.01. Revision date: 3/27/2020. Care instructions adapted under license by [facility] ([facility]). If you have questions about a medical condition or this instruction, always ask your healthcare professional. [place] [address] any warranty or liability for your use of this information. Patient Education        Learning About FAST: Stroke Warning Signs  What is FAST? FAST is a simple way to remember the main symptoms of stroke. Recognizing these symptoms helps you know when to call for medical help. FAST stands for:  · F ace drooping. · A rm weakness. · S peech difficulty. · T marge to call 911. Other stroke symptoms can include sudden confusion, a severe headache, and problems with vision or balance. What happens when you have a stroke? A stroke occurs when a blood vessel to the brain bursts or is blocked by a blood clot. Within minutes, the nerve cells in that part of the brain die. As a result, the part of the body controlled by those cells cannot work properly. The effects of a stroke may range from mild to severe. They may get better, or they may last the rest of your life. A stroke can affect vision, speech, behavior, thought processes, and your ability to move. It can cause symptoms that may include:  · Sudden numbness, tingling, weakness, or loss of movement in your face, arm, or leg, especially on only one side of your body. · Sudden vision changes. · Sudden trouble speaking. · Sudden confusion or trouble understanding simple statements. · Sudden problems with walking or balance. · A sudden, severe headache that is different from past headaches. Why is it important to get help FAST? Quick treatment may save your life. And it may reduce the damage in your brain so that you have fewer problems after the stroke. When you know stroke symptoms, you will know when it's important to call for medical help. Where can you learn more? Go to [email or web address]://myla.[email or web address] org and sign in to your "[employer] account. Enter X462 in the [employer] box to learn more about \"Learning About FAST: Stroke Warning Signs. \"     If you do not have an account, please click on the \"Sign Up Now\" link. Current as of: March 4, 2020               Content Version: 12.6  © 5493-8059 [employer]. Care instructions adapted under license by [address] If you have questions about a medical condition or this instruction, always ask your healthcare professional. [place] any warranty or liability for your use of this information. Patient Education         Headaches: Keeping a Diary (01:31)  Your health professional recommends that you watch this short online health video. Learn how keeping a headache diary can help you find what's causing your pain. How to watch the video    Scan the QR code   OR Visit the website    https://hwi. se/r/Gggksxjpnzsy5   Current as of: November 20, 2019               Content Version: 12.6  © 7523-6699 [employer]. Care instructions adapted under license by [address] If you have questions about a medical condition or this instruction, always ask your healthcare professional. [place] any warranty or liability for your use of this information.

## 2021-02-07 PROBLEM — G43.E09 CHRONIC MIGRAINE WITH AURA: Status: ACTIVE | Noted: 2021-02-07

## 2021-02-07 PROBLEM — I69.30 PERSONAL HISTORY OF STROKE WITH RESIDUAL EFFECTS: Status: ACTIVE | Noted: 2021-02-07

## 2021-02-07 PROBLEM — G43.109 CHRONIC MIGRAINE WITH AURA: Status: ACTIVE | Noted: 2021-02-07

## 2021-02-10 ENCOUNTER — TELEPHONE (OUTPATIENT)
Dept: NEUROLOGY | Age: 31
End: 2021-02-10

## 2021-02-10 NOTE — TELEPHONE ENCOUNTER
Emgality approved by insurance but med will cost $340/60 days. Tier reduction request was denied. MA contacted pt who will fill out pt assistance forms for both meds. MA will mail forms to pt who will return ASAP.   Electronically signed by Zach Reina on 2/10/21 at 10:52 AM EST

## 2021-03-01 ENCOUNTER — TELEPHONE (OUTPATIENT)
Dept: NEUROLOGY | Age: 31
End: 2021-03-01

## 2021-03-01 RX ORDER — ERENUMAB-AOOE 70 MG/ML
70 INJECTION SUBCUTANEOUS
Qty: 3 PEN | Refills: 1 | Status: SHIPPED
Start: 2021-03-01 | End: 2021-03-02

## 2021-03-01 NOTE — TELEPHONE ENCOUNTER
Patient called in stating that she filled out the forms that Hilary Tinajero sent and that she made a little bit too much money to qualify for patient assistance. She wants to know what her next option is. Please advise.

## 2021-03-02 RX ORDER — GALCANEZUMAB 120 MG/ML
120 INJECTION, SOLUTION SUBCUTANEOUS
Qty: 2 PEN | Refills: 0 | COMMUNITY
Start: 2021-03-02 | End: 2021-03-11

## 2021-03-02 NOTE — TELEPHONE ENCOUNTER
[employer] tier exception denied so, per Luz Dykes, samples will be given.   Electronically signed by Bettyann Shone on 3/2/21 at 2:31 PM EST

## 2021-03-11 ENCOUNTER — TELEPHONE (OUTPATIENT)
Dept: NEUROLOGY | Age: 31
End: 2021-03-11

## 2021-03-11 NOTE — TELEPHONE ENCOUNTER
Pt called stating [facility] does not accept her insurance. Referral faxed to [facility].   Electronically signed by Alexandria Rich on 3/11/21 at 12:30 PM EST

## 2021-03-15 RX ORDER — GALCANEZUMAB 120 MG/ML
120 INJECTION, SOLUTION SUBCUTANEOUS
Qty: 2 PEN | Refills: 0 | COMMUNITY
Start: 2021-03-15 | End: 2021-04-30 | Stop reason: SDUPTHER

## 2021-03-16 ENCOUNTER — HOSPITAL ENCOUNTER (OUTPATIENT)
Age: 31
Discharge: HOME | End: 2021-03-16
Payer: MEDICARE

## 2021-03-16 ENCOUNTER — TELEPHONE (OUTPATIENT)
Dept: NEUROLOGY | Age: 31
End: 2021-03-16

## 2021-03-16 DIAGNOSIS — R05.9 COUGH: ICD-10-CM

## 2021-03-16 DIAGNOSIS — I10 ESSENTIAL HYPERTENSION: ICD-10-CM

## 2021-03-16 DIAGNOSIS — E78.5 DYSLIPIDEMIA: ICD-10-CM

## 2021-03-16 DIAGNOSIS — I63.50 CEREBRAL ARTERY OCCLUSION WITH CEREBRAL INFARCTION (HCC): Chronic | ICD-10-CM

## 2021-03-16 DIAGNOSIS — R53.83 FATIGUE, UNSPECIFIED TYPE: ICD-10-CM

## 2021-03-16 DIAGNOSIS — R73.01 IFG (IMPAIRED FASTING GLUCOSE): ICD-10-CM

## 2021-03-16 DIAGNOSIS — R79.89 LOW VITAMIN D LEVEL: ICD-10-CM

## 2021-03-16 LAB
ANION GAP SERPL CALCULATED.3IONS-SCNC: 9 MMOL/L (ref 7–16)
BASOPHILS ABSOLUTE: 0.08 E9/L (ref 0–0.2)
BASOPHILS RELATIVE PERCENT: 1 % (ref 0–2)
BUN BLDV-MCNC: 9 MG/DL (ref 6–20)
CALCIUM SERPL-MCNC: 9.8 MG/DL (ref 8.6–10.2)
CHLORIDE BLD-SCNC: 107 MMOL/L (ref 98–107)
CHOLESTEROL, TOTAL: 178 MG/DL (ref 0–199)
CO2: 22 MMOL/L (ref 22–29)
CREAT SERPL-MCNC: 0.9 MG/DL (ref 0.5–1)
EOSINOPHILS ABSOLUTE: 0.35 E9/L (ref 0.05–0.5)
EOSINOPHILS RELATIVE PERCENT: 4.4 % (ref 0–6)
GFR AFRICAN AMERICAN: >60
GFR NON-AFRICAN AMERICAN: >60 ML/MIN/1.73
GLUCOSE BLD-MCNC: 106 MG/DL (ref 74–99)
HBA1C MFR BLD: 5.2 % (ref 4–5.6)
HCT VFR BLD CALC: 44.5 % (ref 34–48)
HDLC SERPL-MCNC: 39 MG/DL
HEMOGLOBIN: 15.3 G/DL (ref 11.5–15.5)
IMMATURE GRANULOCYTES #: 0.04 E9/L
IMMATURE GRANULOCYTES %: 0.5 % (ref 0–5)
LDL CHOLESTEROL CALCULATED: 77 MG/DL (ref 0–99)
LYMPHOCYTES ABSOLUTE: 2.37 E9/L (ref 1.5–4)
LYMPHOCYTES RELATIVE PERCENT: 29.9 % (ref 20–42)
MCH RBC QN AUTO: 31.9 PG (ref 26–35)
MCHC RBC AUTO-ENTMCNC: 34.4 % (ref 32–34.5)
MCV RBC AUTO: 92.7 FL (ref 80–99.9)
MONOCYTES ABSOLUTE: 0.77 E9/L (ref 0.1–0.95)
MONOCYTES RELATIVE PERCENT: 9.7 % (ref 2–12)
NEUTROPHILS ABSOLUTE: 4.32 E9/L (ref 1.8–7.3)
NEUTROPHILS RELATIVE PERCENT: 54.5 % (ref 43–80)
PDW BLD-RTO: 12 FL (ref 11.5–15)
PLATELET # BLD: 374 E9/L (ref 130–450)
PMV BLD AUTO: 9.4 FL (ref 7–12)
POTASSIUM SERPL-SCNC: 4 MMOL/L (ref 3.5–5)
RBC # BLD: 4.8 E12/L (ref 3.5–5.5)
SODIUM BLD-SCNC: 138 MMOL/L (ref 132–146)
TRIGL SERPL-MCNC: 308 MG/DL (ref 0–149)
TSH SERPL DL<=0.05 MIU/L-ACNC: 2.61 UIU/ML (ref 0.27–4.2)
URIC ACID, SERUM: 6.5 MG/DL (ref 2.4–5.7)
VLDLC SERPL CALC-MCNC: 62 MG/DL
WBC # BLD: 7.9 E9/L (ref 4.5–11.5)

## 2021-03-16 PROCEDURE — 80048 BASIC METABOLIC PNL TOTAL CA: CPT

## 2021-03-16 PROCEDURE — 84550 ASSAY OF BLOOD/URIC ACID: CPT

## 2021-03-16 PROCEDURE — 83036 HEMOGLOBIN GLYCOSYLATED A1C: CPT

## 2021-03-16 PROCEDURE — 36415 COLL VENOUS BLD VENIPUNCTURE: CPT

## 2021-03-16 PROCEDURE — 85025 COMPLETE CBC W/AUTO DIFF WBC: CPT

## 2021-03-16 PROCEDURE — 84443 ASSAY THYROID STIM HORMONE: CPT

## 2021-03-16 PROCEDURE — 80061 LIPID PANEL: CPT

## 2021-03-16 NOTE — TELEPHONE ENCOUNTER
[facility] appeal approved through 12/31/21. MA informed [address] who states med cost is >$200/month with insurance. Tier exception request will not be submitted based on previous denials w/insurance. MA LM for pt with above information.   Electronically signed by Lon Richardson on 3/16/21 at 12:20 PM EDT

## 2021-04-01 DIAGNOSIS — E78.5 DYSLIPIDEMIA: ICD-10-CM

## 2021-04-01 DIAGNOSIS — R05.9 COUGH: ICD-10-CM

## 2021-04-01 DIAGNOSIS — I10 ESSENTIAL HYPERTENSION: ICD-10-CM

## 2021-04-01 DIAGNOSIS — I63.50 CEREBRAL ARTERY OCCLUSION WITH CEREBRAL INFARCTION (HCC): Chronic | ICD-10-CM

## 2021-04-01 DIAGNOSIS — R79.89 LOW VITAMIN D LEVEL: ICD-10-CM

## 2021-04-01 RX ORDER — METOPROLOL SUCCINATE 25 MG/1
12.5 TABLET, EXTENDED RELEASE ORAL DAILY
Qty: 45 TABLET | Refills: 0 | Status: SHIPPED
Start: 2021-04-01 | End: 2021-06-18 | Stop reason: SDUPTHER

## 2021-04-01 RX ORDER — FOLIC ACID 1 MG/1
TABLET ORAL
Qty: 90 TABLET | Refills: 0 | Status: SHIPPED
Start: 2021-04-01 | End: 2022-02-16 | Stop reason: SDUPTHER

## 2021-04-01 RX ORDER — CHOLECALCIFEROL (VITAMIN D3) 125 MCG
CAPSULE ORAL
Qty: 90 TABLET | Refills: 0 | Status: SHIPPED
Start: 2021-04-01 | End: 2021-06-18 | Stop reason: SDUPTHER

## 2021-04-01 RX ORDER — ATORVASTATIN CALCIUM 10 MG/1
TABLET, FILM COATED ORAL
Qty: 90 TABLET | Refills: 0 | Status: SHIPPED
Start: 2021-04-01 | End: 2021-06-18

## 2021-04-01 RX ORDER — ALBUTEROL SULFATE 90 UG/1
2 AEROSOL, METERED RESPIRATORY (INHALATION) EVERY 6 HOURS PRN
Qty: 3 INHALER | Refills: 0 | Status: SHIPPED
Start: 2021-04-01 | End: 2021-04-15 | Stop reason: SDUPTHER

## 2021-04-01 NOTE — TELEPHONE ENCOUNTER
Pt called in she needs refills on her meds, she wants them sent to [facility] number [phone] and they would need to be a 90 day supply because of her insurance and the costs. She needs atorvastatin, metoprolol succinate, folic acid, Vitamin D and albuterol sulfate.

## 2021-04-05 ENCOUNTER — TELEPHONE (OUTPATIENT)
Dept: FAMILY MEDICINE CLINIC | Age: 31
End: 2021-04-05

## 2021-04-05 DIAGNOSIS — R05.9 COUGH: ICD-10-CM

## 2021-04-05 NOTE — TELEPHONE ENCOUNTER
Pt called in she said she spoke with her insurance and the albuterol needs a PA she said the number is [phone], also she said they needed clarification on the Vitamin D and they pharmacy was going to send us a fax over, she wasn't sure what they needed clarification on.

## 2021-04-15 DIAGNOSIS — R05.9 COUGH: ICD-10-CM

## 2021-04-15 RX ORDER — ALBUTEROL SULFATE 90 UG/1
2 AEROSOL, METERED RESPIRATORY (INHALATION) EVERY 6 HOURS PRN
Qty: 3 INHALER | Refills: 1 | Status: SHIPPED
Start: 2021-04-15 | End: 2022-03-16 | Stop reason: SDUPTHER

## 2021-04-15 NOTE — TELEPHONE ENCOUNTER
Patient called for refill. Chart reviewed - Rx sent to the pharmacy.     Electronically signed by Christine Noble MA on 4/15/21 at 2:12 PM EDT

## 2021-04-29 NOTE — PROGRESS NOTES
[address] [place] [address]. Arian Stanton M.D., F.A.C.P. Jimena Moreno, FRANCISCO, APRN, CNS  Familia Aguilar. Marlene Ruiz, MSN, APRN-FNP-C  [place] MSN, APRN, FNP-C  Fabrizio LONG, PA-C  [address] MSN, APRN, FNP-C  [address] [place], [address]  Phone: [phone]  Fax: [phone]           Lise Cadet is a 27 y.o. right handed female     We are following her for history of stroke and chronic migraines    She presents alone and is an excellent historian     Having some increased dysesthesias in her left lower extremity, radiating from her left calf up into the posterior thigh. She will sometimes have to take a hot bath in order to relieve her pains. She has not yet gone for replacement of her L AFO. No other new weakness or neuro symptoms since her last visit. She has been maintained on samples of Emgality and Nurtec due to insurance denials, which has shown a reduction to 9 headache days per month. She does notice a wearing off effect about about 4 days prior to Emgality injections, but Nurtec is very effective for rescue. She is weaned herself down to 100 mg of topiramate nightly and feels greater mental clarity, and would like to decrease this medication further. She followed with neurologist specializing in POTS at [facility], and is requesting medication refills for her Mestinon and metoprolol. She would rather not return to [facility] clinic if she does not have to.   No other new concerns since her last visit    No chest pain or palpitations  No SOB  No vertigo, lightheadedness or loss of consciousness  No falls, tripping or stumbling  No incontinence of bowels or bladder  No itching or bruising appreciated  No speech or swallowing troubles    ROS otherwise negative     Medications:     Current Outpatient Medications   Medication Sig Dispense Refill    albuterol sulfate HFA (VENTOLIN HFA) 108 (90 Base) MCG/ACT inhaler Inhale 2 puffs into the lungs every 6 hours as needed for Wheezing 3 Inhaler 1    atorvastatin (LIPITOR) 10 MG tablet TAKE ONE TABLET BY MOUTH ONCE DAILY 90 tablet 0    metoprolol succinate (TOPROL XL) 25 MG extended release tablet Take 0.5 tablets by mouth daily 45 tablet 0    folic acid (FOLVITE) 1 MG tablet TAKE 1 TABLET BY MOUTH ONCE DAILY 90 tablet 0    Cholecalciferol (VITAMIN D3) 50 MCG (2000 UT) TABS Take one tablet daily 90 tablet 0    Galcanezumab-gnlm (EMGALITY) 120 MG/ML SOAJ Inject 120 mg into the skin every 30 days 2 pen 0    Rimegepant Sulfate 75 MG TBDP Take 75 mg by mouth daily as needed (migraine) No more than 1 tab in 24 hours; lot [id number] exp 10/2022 4 tablet 0    zolpidem (AMBIEN) 10 MG tablet Take 1 tablet by mouth nightly.  topiramate (TOPAMAX) 100 MG tablet Take 1.5 tablets by mouth nightly 60 tablet 11    Rimegepant Sulfate 75 MG TBDP Take 75 mg by mouth daily as needed (migraine) No more than 1 tab in 24 hours 8 tablet 3    fluticasone (FLONASE) 50 MCG/ACT nasal spray 1 spray by Nasal route daily (Patient taking differently: 1 spray by Nasal route daily as needed ) 1 Bottle 3    clonazePAM (KLONOPIN) 1 MG tablet Take 1 mg by mouth 2 times daily.  tiZANidine (ZANAFLEX) 2 MG tablet TAKE ONE TABLET BY MOUTH AT BEDTIME 90 tablet 1    pyridostigmine (MESTINON) 60 MG tablet Take 1 tablet by mouth 3 times daily 270 tablet 1    apixaban (ELIQUIS) 5 MG TABS tablet Take 5 mg by mouth 2 times daily      citalopram (CELEXA) 20 MG tablet Take 20 mg by mouth daily      acetaminophen (TYLENOL) 500 MG tablet Take 500 mg by mouth every 6 hours as needed       hydrocortisone 2.5 % cream Apply topically 2 times daily. (Patient taking differently: daily as needed ) 1 Tube 3     No current facility-administered medications for this visit.       Objective:     /73 (Site: Left Upper Arm, Position: Sitting, Cuff Size: Large Adult)   Pulse 89   Temp 98.4 °F (36.9 °C) (Infrared)   Resp 12   SpO2 97%     General appearance: alert, appears stated age, cooperative and in no distress  Head: normocephalic, without obvious abnormality, atraumatic  Eyes:clear  Neck: no carotid bruit--full ROM without cervicalgia  Lungs: clear to auscultation bilaterally  Heart: regular rate and rhythm--no murmur  Extremities: L AFO; atraumatic, no cyanosis or edema  Pulses: 2+ and symmetric  Skin:  color, texture, turgor normal--no rashes or lesions      Mental Status: alert and oriented x 4    Appropriate attention/concentration  Intact fundus of knowledge  Memories intact    Speech: no dysarthria  Language: no aphasias    Cranial Nerves:  I: smell    II: visual acuity     II: visual fields Full    II: pupils MILADIS   III,VII: ptosis None   III,IV,VI: extraocular muscles  EOMI without nystagmus   V: mastication Normal   V: facial light touch sensation  Normal   V,VII: corneal reflex     VII: facial muscle function - upper  Normal   VII: facial muscle function - lower Normal   VIII: hearing Normal   IX: soft palate elevation  Normal   IX,X: gag reflex    XI: trapezius strength  5/5   XI: sternocleidomastoid strength 5/5   XI: neck extension strength  5/5   XII: tongue strength  Normal     Motor:  5/5 BUE no drift today  5/5 RLE; Sudden giving way LLE  Normal bulk; increased tone LLE  No abnormal movements    Sensory:  LT decreased LUE and LLE     Coordination:   FN, FFM normal on R  Functional ataxia with FN testing on L    Gait:  Limps on L    DTR:   Right Brachioradialis reflex 2+  Left Brachioradialis reflex 2+  Right Biceps reflex 2+  Left Biceps reflex 2+  Right Triceps reflex 2+  Left Triceps reflex 2+  Right Quadriceps reflex 2+  Left Quadriceps reflex 2+  Right Achilles reflex 2+  Left Achilles reflex NOT DONE--AFO    No Irving's    No other pathological reflexes    Laboratory/Radiology:  ry/Radiology:     CBC with Differential:    Lab Results   Component Value Date    WBC 7.9 03/16/2021    RBC 4.80 03/16/2021    HGB 15.3 03/16/2021    HCT 44.5 03/16/2021     03/16/2021    MCV 92.7 03/16/2021    MCH 31.9 03/16/2021    MCHC 34.4 03/16/2021    RDW 12.0 03/16/2021    LYMPHOPCT 29.9 03/16/2021    MONOPCT 9.7 03/16/2021    BASOPCT 1.0 03/16/2021    MONOSABS 0.77 03/16/2021    LYMPHSABS 2.37 03/16/2021    EOSABS 0.35 03/16/2021    BASOSABS 0.08 03/16/2021     CMP:    Lab Results   Component Value Date     03/16/2021    K 4.0 03/16/2021     03/16/2021    CO2 22 03/16/2021    BUN 9 03/16/2021    CREATININE 0.9 03/16/2021    GFRAA >60 03/16/2021    LABGLOM >60 03/16/2021    GLUCOSE 106 03/16/2021    PROT 7.5 06/15/2020    LABALBU 4.2 06/15/2020    CALCIUM 9.8 03/16/2021    BILITOT 0.3 06/15/2020    ALKPHOS 70 06/15/2020    AST 17 06/15/2020    ALT 17 06/15/2020     HgBA1c:    Lab Results   Component Value Date    LABA1C 5.2 03/16/2021     FLP:    Lab Results   Component Value Date    TRIG 308 03/16/2021    HDL 39 03/16/2021    LDLCALC 77 03/16/2021    LABVLDL 62 03/16/2021     All labs and images were personally reviewed at the time of this visit    Assessment:     Hx right hemispheric stroke: with residual left sided weakness and spasticity. Etiology suspected to be hypercoagulable state from clotting disorder (also on OCP and was smoking). With residual L sided weakness and spasticity. Having left lower extremity dysesthesias which may respond to gabapentin. Neuro exam is otherwise unchanged    Chronic migraines with aura: Reduction to 9 migraines per month since starting Emgality, with Nurtec very effective for rescue. We will continue topiramate wean d/t mental fogginess and to reduce pill burden. POTS: We will refer to EP, as our practice does not typically manage this condition.     Hypercoagulopathy: on [address] and follows with [facility]    Plan:     Refer to EP for POTS--if they will not see, she will have to return to [facility] specialist    Continue OAC/statin    Continue Emgality 120 mg monthly--samples given    Nurtec PRN--samples given    Wean off topiramate over the next few weeks    Start gabapentin 200 mg nightly PRN    She must stop smoking    HA diary    Stroke s/s reviewed    F/U with Francisco Gaytan for new L AFO    RTO in 4-6 months or sooner PRN    DOUGLAS Rai  11:53 AM  4/29/2021

## 2021-04-30 ENCOUNTER — OFFICE VISIT (OUTPATIENT)
Dept: NEUROLOGY | Age: 31
End: 2021-04-30
Payer: MEDICARE

## 2021-04-30 ENCOUNTER — TELEPHONE (OUTPATIENT)
Dept: NON INVASIVE DIAGNOSTICS | Age: 31
End: 2021-04-30

## 2021-04-30 VITALS
DIASTOLIC BLOOD PRESSURE: 73 MMHG | HEART RATE: 89 BPM | RESPIRATION RATE: 12 BRPM | TEMPERATURE: 98.4 F | OXYGEN SATURATION: 97 % | SYSTOLIC BLOOD PRESSURE: 104 MMHG

## 2021-04-30 DIAGNOSIS — I69.30 PERSONAL HISTORY OF STROKE WITH RESIDUAL EFFECTS: Primary | ICD-10-CM

## 2021-04-30 DIAGNOSIS — G90.A POTS (POSTURAL ORTHOSTATIC TACHYCARDIA SYNDROME): ICD-10-CM

## 2021-04-30 DIAGNOSIS — G43.109 CHRONIC MIGRAINE WITH AURA: ICD-10-CM

## 2021-04-30 PROCEDURE — G8417 CALC BMI ABV UP PARAM F/U: HCPCS | Performed by: NURSE PRACTITIONER

## 2021-04-30 PROCEDURE — 4004F PT TOBACCO SCREEN RCVD TLK: CPT | Performed by: NURSE PRACTITIONER

## 2021-04-30 PROCEDURE — 99214 OFFICE O/P EST MOD 30 MIN: CPT | Performed by: NURSE PRACTITIONER

## 2021-04-30 PROCEDURE — G8427 DOCREV CUR MEDS BY ELIG CLIN: HCPCS | Performed by: NURSE PRACTITIONER

## 2021-04-30 RX ORDER — GALCANEZUMAB 120 MG/ML
120 INJECTION, SOLUTION SUBCUTANEOUS
Qty: 4 PEN | Refills: 0 | COMMUNITY
Start: 2021-04-30 | End: 2021-09-02

## 2021-04-30 RX ORDER — TOPIRAMATE 100 MG/1
TABLET, FILM COATED ORAL
Qty: 14 TABLET | Refills: 0
Start: 2021-04-30 | End: 2021-06-18 | Stop reason: ALTCHOICE

## 2021-04-30 RX ORDER — GABAPENTIN 100 MG/1
200 CAPSULE ORAL NIGHTLY PRN
Qty: 60 CAPSULE | Refills: 11 | Status: SHIPPED
Start: 2021-04-30 | End: 2021-06-18

## 2021-04-30 NOTE — PATIENT INSTRUCTIONS
Patient Education        Learning About FAST: Stroke Warning Signs  What is FAST? FAST is a simple way to remember the main symptoms of stroke. Recognizing these symptoms helps you know when to call for medical help. FAST stands for:  · F ace drooping. · A rm weakness. · S peech difficulty. · T marge to call 911. Other stroke symptoms can include sudden confusion, a severe headache, and problems with vision or balance. What happens when you have a stroke? A stroke occurs when a blood vessel to the brain bursts or is blocked by a blood clot. Within minutes, the nerve cells in that part of the brain die. As a result, the part of the body controlled by those cells cannot work properly. The effects of a stroke may range from mild to severe. They may get better, or they may last the rest of your life. A stroke can affect vision, speech, behavior, thought processes, and your ability to move. It can cause symptoms that may include:  · Sudden numbness, tingling, weakness, or loss of movement in your face, arm, or leg, especially on only one side of your body. · Sudden vision changes. · Sudden trouble speaking. · Sudden confusion or trouble understanding simple statements. · Sudden problems with walking or balance. · A sudden, severe headache that is different from past headaches. Why is it important to get help FAST? Quick treatment may save your life. And it may reduce the damage in your brain so that you have fewer problems after the stroke. When you know stroke symptoms, you will know when it's important to call for medical help. Where can you learn more? Go to https://Inven[employer]ype[employer].ElementsLocal. org and sign in to your Quisk account. Enter M445 in the Hipcricket box to learn more about \"Learning About FAST: Stroke Warning Signs. \"     If you do not have an account, please click on the \"Sign Up Now\" link.   Current as of: March 4, 2020               Content Version: 12.8  © 8731-3892 disease (COPD);  · kidney disease (or if you are on dialysis);  · diabetes;  · depression, a mood disorder, or suicidal thoughts or actions;  · a drug addiction;  · a seizure (unless you take gabapentin to treat seizures);  · liver disease;  · heart disease; or  · (for patients with RLS) if you are a day sleeper or work a night shift. Some people have thoughts about suicide while taking this medicine. Children taking gabapentin may have behavior changes. Stay alert to changes in your mood or symptoms. Report any new or worsening symptoms to your doctor. It is not known whether this medicine will harm an unborn baby. Tell your doctor if you are pregnant or plan to become pregnant. Seizure control is very important during pregnancy, and having a seizure could harm both mother and baby. Do not start or stop taking gabapentin for seizures without your doctor's advice, and tell your doctor right away if you become pregnant. It may not be safe to breastfeed while using this medicine. Ask your doctor about any risk. How should I take gabapentin? Follow all directions on your prescription label. Do not take this medicine in larger or smaller amounts or for longer than recommended. If your doctor changes your brand, strength, or type of gabapentin, your dosage needs may change. Ask your pharmacist if you have any questions about the new kind of gabapentin you receive at the pharmacy. The Horizant brand of gabapentin should not be taken during the day. For best results, take Horizant with food at about 5:00 in the evening. Both Gralise and Horizant should be taken with food. Neurontin can be taken with or without food. If you break a Neurontin tablet and take only half of it, take the other half at your next dose. Any tablet that has been broken should be used as soon as possible or within a few days. Swallow the capsule  or tablet  whole and do not crush, chew, break, or open it.   Measure liquid medicine carefully. Use the dosing syringe provided, or use a medicine dose-measuring device (not a kitchen spoon). Do not stop using gabapentin suddenly, even if you feel fine. Stopping suddenly may cause increased seizures. Follow your doctor's instructions about tapering your dose. In case of emergency, wear or carry medical identification to let others know you have seizures. This medicine can cause unusual results with certain medical tests. Tell any doctor who treats you that you are using gabapentin. Store gabapentin tablets and capsules at room temperature away from light and moisture. Store the liquid medicine in the refrigerator. Do not freeze. What happens if I miss a dose? Take the medicine as soon as you can, but skip the missed dose if it is almost time for your next dose. Do not take two doses at one time. If you take Horizant:  Skip the missed dose and use your next dose at the regular time. Do not use two doses of Horizant at one time. What happens if I overdose? Seek emergency medical attention or call the Poison Help line at 1-636.638.6353. What should I avoid while taking gabapentin? Avoid driving or hazardous activity until you know how this medicine will affect you. Your reactions could be impaired. Dizziness or drowsiness can cause falls, accidents, or severe injuries. Avoid taking an antacid within 2 hours before or after you take gabapentin. Antacids can make it harder for your body to absorb gabapentin. Avoid drinking alcohol while taking gabapentin. What are the possible side effects of gabapentin? Get emergency medical help if you have signs of an allergic reaction: hives; difficult breathing; swelling of your face, lips, tongue, or throat. Seek medical treatment if you have a serious drug reaction that can affect many parts of your body.  Symptoms may include: skin rash, fever, swollen glands, muscle aches, severe weakness, unusual bruising, upper stomach pain, or yellowing of your skin or eyes. Report any new or worsening symptoms to your doctor, such as: mood or behavior changes, anxiety, panic attacks, trouble sleeping, or if you feel impulsive, irritable, agitated, hostile, aggressive, restless, hyperactive (mentally or physically), depressed, or have thoughts about suicide or hurting yourself. Call your doctor at once if you have:  · weak or shallow breathing;  · blue-colored skin, lips, fingers, and toes;  · confusion, extreme drowsiness or weakness;  · problems with balance or muscle movement;  · unusual or involuntary eye movements; or  · increased seizures. Gabapentin can cause life-threatening breathing problems. A person caring for you should seek emergency medical attention if you have slow breathing with long pauses, blue colored lips, or if you are hard to wake up. Breathing problems may be more likely in older adults or in people with COPD. Some side effects are more likely in children taking gabapentin. Contact your doctor if the child taking this medicine has any of the following side effects:  · changes in behavior;  · memory problems;  · trouble concentrating; or  · acting restless, hostile, or aggressive. Common side effects may include:  · headache;  · dizziness, drowsiness, tiredness;  · problems with balance or eye movements; or  · (in children) fever, nausea, vomiting. This is not a complete list of side effects and others may occur. Call your doctor for medical advice about side effects. You may report side effects to FDA at 3-304-FDA-2523. What other drugs will affect gabapentin? Using gabapentin with other drugs that slow your breathing can cause dangerous side effects or death. Ask your doctor before using opioid medication, a sleeping pill, cold or allergy medicine, a muscle relaxer, or medicine for anxiety or seizures. Other drugs may affect gabapentin, including prescription and over-the-counter medicines, vitamins, and herbal products.  Tell your doctor about all your current medicines and any medicine you start or stop using. Where can I get more information? Your pharmacist can provide more information about gabapentin. Remember, keep this and all other medicines out of the reach of children, never share your medicines with others, and use this medication only for the indication prescribed. Every effort has been made to ensure that the information provided by [facility] is accurate, up-to-date, and complete, but no guarantee is made to that effect. Drug information contained herein may be time sensitive. [facility] information has been compiled for use by healthcare practitioners and consumers in the [place] and therefore [facility] does not warrant that uses outside of the [place] are appropriate, unless specifically indicated otherwise. [facility]'s drug information does not endorse drugs, diagnose patients or recommend therapy. [facility]'s drug information is an informational resource designed to assist licensed healthcare practitioners in caring for their patients and/or to serve consumers viewing this service as a supplement to, and not a substitute for, the expertise, skill, knowledge and judgment of healthcare practitioners. The absence of a warning for a given drug or drug combination in no way should be construed to indicate that the drug or drug combination is safe, effective or appropriate for any given patient. [facility] does not assume any responsibility for any aspect of healthcare administered with the aid of information [facility] provides. The information contained herein is not intended to cover all possible uses, directions, precautions, warnings, drug interactions, allergic reactions, or adverse effects. If you have questions about the drugs you are taking, check with your doctor, nurse or pharmacist.  Copyright 8903-9245 [address]. Version: 16.01. Revision date: 4/30/2020.   Care instructions adapted under

## 2021-04-30 NOTE — TELEPHONE ENCOUNTER
Called and left a message to call the office back to schedule a new pt appointment with next available.   Referral in [address] [place] [address]

## 2021-05-24 ENCOUNTER — TELEPHONE (OUTPATIENT)
Dept: NON INVASIVE DIAGNOSTICS | Age: 31
End: 2021-05-24

## 2021-06-02 DIAGNOSIS — I10 ESSENTIAL HYPERTENSION: ICD-10-CM

## 2021-06-02 DIAGNOSIS — I63.50 CEREBRAL ARTERY OCCLUSION WITH CEREBRAL INFARCTION (HCC): Chronic | ICD-10-CM

## 2021-06-02 DIAGNOSIS — E78.5 DYSLIPIDEMIA: ICD-10-CM

## 2021-06-02 RX ORDER — FOLIC ACID 1 MG/1
TABLET ORAL
Qty: 90 TABLET | Refills: 3 | OUTPATIENT
Start: 2021-06-02

## 2021-06-02 RX ORDER — METOPROLOL SUCCINATE 25 MG/1
TABLET, EXTENDED RELEASE ORAL
Qty: 45 TABLET | Refills: 3 | OUTPATIENT
Start: 2021-06-02

## 2021-06-02 RX ORDER — ATORVASTATIN CALCIUM 10 MG/1
TABLET, FILM COATED ORAL
Qty: 90 TABLET | Refills: 3 | OUTPATIENT
Start: 2021-06-02

## 2021-06-17 DIAGNOSIS — R05.9 COUGH: ICD-10-CM

## 2021-06-17 RX ORDER — ALBUTEROL SULFATE 90 UG/1
AEROSOL, METERED RESPIRATORY (INHALATION)
Qty: 72 G | Refills: 3 | OUTPATIENT
Start: 2021-06-17

## 2021-06-18 ENCOUNTER — VIRTUAL VISIT (OUTPATIENT)
Dept: FAMILY MEDICINE CLINIC | Age: 31
End: 2021-06-18
Payer: MEDICARE

## 2021-06-18 ENCOUNTER — TELEPHONE (OUTPATIENT)
Dept: FAMILY MEDICINE CLINIC | Age: 31
End: 2021-06-18

## 2021-06-18 DIAGNOSIS — D68.2 FACTOR V DEFICIENCY (HCC): ICD-10-CM

## 2021-06-18 DIAGNOSIS — I10 ESSENTIAL HYPERTENSION: Primary | ICD-10-CM

## 2021-06-18 DIAGNOSIS — R79.89 LOW VITAMIN D LEVEL: ICD-10-CM

## 2021-06-18 DIAGNOSIS — R53.83 FATIGUE, UNSPECIFIED TYPE: ICD-10-CM

## 2021-06-18 DIAGNOSIS — E66.01 MORBID OBESITY (HCC): ICD-10-CM

## 2021-06-18 DIAGNOSIS — R73.01 IFG (IMPAIRED FASTING GLUCOSE): ICD-10-CM

## 2021-06-18 DIAGNOSIS — E78.5 DYSLIPIDEMIA: ICD-10-CM

## 2021-06-18 PROCEDURE — G8417 CALC BMI ABV UP PARAM F/U: HCPCS | Performed by: FAMILY MEDICINE

## 2021-06-18 PROCEDURE — 99214 OFFICE O/P EST MOD 30 MIN: CPT | Performed by: FAMILY MEDICINE

## 2021-06-18 PROCEDURE — G8427 DOCREV CUR MEDS BY ELIG CLIN: HCPCS | Performed by: FAMILY MEDICINE

## 2021-06-18 PROCEDURE — 4004F PT TOBACCO SCREEN RCVD TLK: CPT | Performed by: FAMILY MEDICINE

## 2021-06-18 RX ORDER — METOPROLOL SUCCINATE 25 MG/1
12.5 TABLET, EXTENDED RELEASE ORAL DAILY
Qty: 45 TABLET | Refills: 1 | Status: SHIPPED
Start: 2021-06-18 | End: 2021-09-02

## 2021-06-18 RX ORDER — CHOLECALCIFEROL (VITAMIN D3) 125 MCG
CAPSULE ORAL
Qty: 90 TABLET | Refills: 1 | Status: SHIPPED
Start: 2021-06-18 | End: 2022-03-16 | Stop reason: SDUPTHER

## 2021-06-18 RX ORDER — DIPHENHYDRAMINE HCL 25 MG
25 TABLET ORAL NIGHTLY PRN
COMMUNITY
End: 2022-04-07

## 2021-06-18 RX ORDER — ASPIRIN 81 MG/1
81 TABLET ORAL 2 TIMES DAILY
COMMUNITY
End: 2022-02-16

## 2021-06-18 ASSESSMENT — ENCOUNTER SYMPTOMS
VOMITING: 0
COLOR CHANGE: 0
ORTHOPNEA: 0
BLOOD IN STOOL: 0
DIARRHEA: 0
RHINORRHEA: 0
EYE DISCHARGE: 0
BACK PAIN: 1
CONSTIPATION: 0
EYE PAIN: 0
FACIAL SWELLING: 0
ABDOMINAL DISTENTION: 0
ABDOMINAL PAIN: 0
APNEA: 0
SHORTNESS OF BREATH: 0
NAUSEA: 0
RECTAL PAIN: 0
GASTROINTESTINAL NEGATIVE: 1
BLURRED VISION: 0
EYE ITCHING: 0
ANAL BLEEDING: 0
SINUS PRESSURE: 0
PHOTOPHOBIA: 0
TROUBLE SWALLOWING: 0
STRIDOR: 0
COUGH: 0
VOICE CHANGE: 0
RESPIRATORY NEGATIVE: 1
CHOKING: 0
SORE THROAT: 0
ALLERGIC/IMMUNOLOGIC NEGATIVE: 1
EYE REDNESS: 0
SINUS PAIN: 0
CHEST TIGHTNESS: 0
WHEEZING: 0

## 2021-06-18 NOTE — PROGRESS NOTES
have spent 30  minutes reviewing previous notes, test results and face to face (virtual) with the patient discussing the diagnosis and importance of compliance with the treatment plan as well as documenting on the day of the visit. ,     This visit was completed virtually using Doxy. me  The patient is here for a medication list and treatment planning review  We will go over our care planning goals as well as take care of all refills  We will set up labs as well   She is now pregnant and seeing a high risk OB             Hypertension  This is a chronic problem. The current episode started more than 1 year ago. The problem is controlled. Associated symptoms include anxiety and malaise/fatigue. Pertinent negatives include no blurred vision, chest pain, headaches, neck pain, orthopnea, palpitations, peripheral edema, PND, shortness of breath or sweats. Risk factors for coronary artery disease include family history. Past treatments include lifestyle changes and beta blockers. The current treatment provides significant improvement. Compliance problems include exercise, diet and psychosocial issues. Hypertensive end-organ damage includes CVA. There is no history of angina, kidney disease, CAD/MI (leiden factor V), heart failure, left ventricular hypertrophy, PVD (H/O DVT lower extremity ) or retinopathy. Identifiable causes of hypertension include a thyroid problem. There is no history of chronic renal disease, coarctation of the aorta, hyperaldosteronism, hypercortisolism, hyperparathyroidism, a hypertension causing med, pheochromocytoma, renovascular disease or sleep apnea. Back Pain  This is a chronic problem. The current episode started more than 1 month ago. The problem occurs constantly. The problem has been gradually worsening since onset. The pain is present in the lumbar spine. The quality of the pain is described as shooting and stabbing. The pain is at a severity of 8/10. The pain is moderate.  The pain is Neurological: Positive for tingling, weakness and numbness. Negative for dizziness, tremors, seizures, syncope, facial asymmetry, speech difficulty, light-headedness, headaches and paresthesias. Hematological: Negative for adenopathy. Bruises/bleeds easily. Psychiatric/Behavioral: Positive for decreased concentration and dysphoric mood. Negative for agitation, behavioral problems, confusion, hallucinations, self-injury, sleep disturbance and suicidal ideas. The patient is nervous/anxious. The patient is not hyperactive. Past Medical/Surgical Hx;  Reviewed with patient      Diagnosis Date    Anxiety     Asthma     Blood clot embolism during pregnancy, antepartum     Cerebrovascular accident (CVA) due to vascular occlusion (HCC)     Chronic migraine     Coagulopathy ([facility])     DVT of leg (deep venous thrombosis) ([facility])     left    DVT of upper extremity (deep vein thrombosis) ([facility])     Left X 2    Heterozygous for BERT-1 4G allele     Low back pain     MTHFR mutation     POTS (postural orthostatic tachycardia syndrome)     Shingles      Past Surgical History:   Procedure Laterality Date    DILATION AND CURETTAGE OF UTERUS      NERVE BLOCK Left 08/02/2018    transforaminal epidural #1    SD ZEHRA NOSE/CLEFT LIP/TIP Left 08/02/2018    LEFT L5-S1 TRANSFORAMINAL EPIDURAL STEROID INJECTION #1 performed by Jay Bennett DO at [facility] Right        Past Family Hx:  Reviewed with patient      Problem Relation Age of Onset    Diabetes Father     Deep Vein Thrombosis Maternal Grandmother     Substance Abuse Brother     Stroke Paternal Uncle        Social Hx:  Reviewed with patient  Social History     Tobacco Use    Smoking status: Current Some Day Smoker     Types: Cigarettes    Smokeless tobacco: Never Used   Substance Use Topics    Alcohol use: Not Currently       OBJECTIVE  There were no vitals taken for this visit.     Problem List:  Maya suarez not have any pertinent problems on file. PHYS EX:  General: Awake/Alert/Oriented/No Acute Distress      ASSESSMENT/PLAN  Anayeli Chirinos was seen today for medication refill. Diagnoses and all orders for this visit:    Essential hypertension ---controlled   --patient is instructed on low to moderate sodium ( 2 to 2.5 grams ), daily    Also to increase potassium in the diet to about 3.5 grams daily    Literature is provided     -     metoprolol succinate (TOPROL XL) 25 MG extended release tablet; Take 0.5 tablets by mouth daily  -     Comprehensive Metabolic Panel; Future  -     CBC Auto Differential; Future    Low vitamin D level  -     Cholecalciferol (VITAMIN D3) 50 MCG (2000 UT) TABS; Take one tablet daily  -     Comprehensive Metabolic Panel; Future  -     CBC Auto Differential; Future    Morbid obesity ([facility]  -     Comprehensive Metabolic Panel; Future  -     CBC Auto Differential; Future  --talk diet and weight loss    Factor V deficiency ([facility]  -     Comprehensive Metabolic Panel; Future  -     CBC Auto Differential; Future  --stable on current care planning  -- continue treatment as we are meeting goals   Long talk on treatment and prevention  Literature is given       IFG (impaired fasting glucose)  -     Comprehensive Metabolic Panel; Future  -     CBC Auto Differential; Future  -     Hemoglobin A1C; Future    ---VASCULAR PANEL  A) ASA, plavix, aggrenox  B) LOVENOX, pletal, tzd, statin  C) ace, hctz, folic, ccb  D) cannikinumab, fish oils     ---CARDIAC---LOVENOX, ace, BETA, statin, hctz, ( ccb )    Dyslipidemia  -     Comprehensive Metabolic Panel; Future  -     Lipid Panel; Future  -     CBC Auto Differential; Future  --Mediterranean diet, exercise, weight loss, vitamins    We have a long talk on cholesterol and importance of lowering it       Fatigue, unspecified type  -     TSH without Reflex; Future  -     Uric Acid; Future  -     Comprehensive Metabolic Panel;  Future  -     CBC Auto Differential; Future        Outpatient Encounter Medications as of 6/18/2021   Medication Sig Dispense Refill    enoxaparin (LOVENOX) 100 MG/ML injection Inject 100 mg into the skin daily      Prenatal Vit-DSS-Fe Cbn-FA (PRENATAL AD PO) Take by mouth      diphenhydrAMINE (BENADRYL) 25 MG tablet Take 25 mg by mouth nightly as needed for Sleep      aspirin 81 MG EC tablet Take 81 mg by mouth 2 times daily      Cholecalciferol (VITAMIN D3) 50 MCG (2000 UT) TABS Take one tablet daily 90 tablet 1    metoprolol succinate (TOPROL XL) 25 MG extended release tablet Take 0.5 tablets by mouth daily 45 tablet 1    Rimegepant Sulfate 75 MG TBDP Take 75 mg by mouth daily as needed (migraine) No more than 1 tab in 24 hours; lot [id number] exp 10/2022 10 tablet 0    Galcanezumab-gnlm (EMGALITY) 120 MG/ML SOAJ Inject 120 mg into the skin every 30 days 2 boxes lot [id number] exp 7/2022 4 pen 0    albuterol sulfate HFA (VENTOLIN HFA) 108 (90 Base) MCG/ACT inhaler Inhale 2 puffs into the lungs every 6 hours as needed for Wheezing 3 Inhaler 1    folic acid (FOLVITE) 1 MG tablet TAKE 1 TABLET BY MOUTH ONCE DAILY (Patient taking differently: Take 3 mg by mouth daily ) 90 tablet 0    clonazePAM (KLONOPIN) 1 MG tablet Take 0.25 mg by mouth daily. Weaning off.  pyridostigmine (MESTINON) 60 MG tablet Take 1 tablet by mouth 3 times daily 270 tablet 1    citalopram (CELEXA) 20 MG tablet Take 20 mg by mouth daily      acetaminophen (TYLENOL) 500 MG tablet Take 500 mg by mouth every 6 hours as needed       [DISCONTINUED] topiramate (TOPAMAX) 100 MG tablet Take 0.5 tablets by mouth nightly for 7 days, THEN 0.5 tablets every other day for 7 days. THEN STOP. 14 tablet 0    [DISCONTINUED] gabapentin (NEURONTIN) 100 MG capsule Take 2 capsules by mouth nightly as needed (leg pain) for up to 360 days.  Intended supply: 90 days (Patient not taking: Reported on 6/18/2021) 60 capsule 11    atorvastatin (LIPITOR) 10 MG tablet TAKE ONE TABLET BY MOUTH ONCE DAILY (Patient not taking: Reported on 6/18/2021) 90 tablet 0    [DISCONTINUED] metoprolol succinate (TOPROL XL) 25 MG extended release tablet Take 0.5 tablets by mouth daily 45 tablet 0    [DISCONTINUED] Cholecalciferol (VITAMIN D3) 50 MCG (2000 UT) TABS Take one tablet daily 90 tablet 0    [DISCONTINUED] zolpidem (AMBIEN) 10 MG tablet Take 1 tablet by mouth nightly.  fluticasone (FLONASE) 50 MCG/ACT nasal spray 1 spray by Nasal route daily (Patient taking differently: 1 spray by Nasal route daily as needed ) 1 Bottle 3    [DISCONTINUED] tiZANidine (ZANAFLEX) 2 MG tablet TAKE ONE TABLET BY MOUTH AT BEDTIME (Patient not taking: Reported on 6/18/2021) 90 tablet 1    [DISCONTINUED] apixaban (ELIQUIS) 5 MG TABS tablet Take 5 mg by mouth 2 times daily (Patient not taking: Reported on 6/18/2021)      [DISCONTINUED] hydrocortisone 2.5 % cream Apply topically 2 times daily. (Patient taking differently: daily as needed ) 1 Tube 3     No facility-administered encounter medications on file as of 6/18/2021. No follow-ups on file.         Reviewed recent labs related to Gertrude's current problems      Discussed importance of regular Health Maintenance follow up  Health Maintenance   Topic    Varicella vaccine (1 of 2 - 2-dose childhood series)    Pneumococcal 0-64 years Vaccine (1 of 2 - PPSV23)    COVID-19 Vaccine (1)    HIV screen     DTaP/Tdap/Td vaccine (1 - Tdap)    Cervical cancer screen     Annual Wellness Visit (AWV)     Lipid screen     Flu vaccine     Hepatitis C screen     Hepatitis A vaccine     Hepatitis B vaccine     Hib vaccine     Meningococcal (ACWY) vaccine

## 2021-06-18 NOTE — TELEPHONE ENCOUNTER
This MA spoke with pt - pt is NOT taking Lipitor.     Electronically signed by Bhavana Valentin MA on 6/18/21 at 3:59 PM EDT

## 2021-06-18 NOTE — TELEPHONE ENCOUNTER
----- Message from Alejandra Mitchell DO sent at 6/18/2021 12:16 PM EDT -----  Be sure she is NOT taking her Lipitor while pregnant

## 2021-07-13 ENCOUNTER — APPOINTMENT (OUTPATIENT)
Dept: CT IMAGING | Age: 31
End: 2021-07-13
Payer: MEDICARE

## 2021-07-13 ENCOUNTER — HOSPITAL ENCOUNTER (EMERGENCY)
Age: 31
Discharge: HOME | End: 2021-07-14
Attending: EMERGENCY MEDICINE
Payer: MEDICARE

## 2021-07-13 VITALS
DIASTOLIC BLOOD PRESSURE: 86 MMHG | HEART RATE: 89 BPM | BODY MASS INDEX: 42.59 KG/M2 | RESPIRATION RATE: 18 BRPM | HEIGHT: 66 IN | SYSTOLIC BLOOD PRESSURE: 169 MMHG | TEMPERATURE: 97.2 F | OXYGEN SATURATION: 100 % | WEIGHT: 265 LBS

## 2021-07-13 DIAGNOSIS — G89.29 CHRONIC NONINTRACTABLE HEADACHE, UNSPECIFIED HEADACHE TYPE: Primary | ICD-10-CM

## 2021-07-13 DIAGNOSIS — R51.9 CHRONIC NONINTRACTABLE HEADACHE, UNSPECIFIED HEADACHE TYPE: Primary | ICD-10-CM

## 2021-07-13 DIAGNOSIS — Z3A.13 13 WEEKS GESTATION OF PREGNANCY: ICD-10-CM

## 2021-07-13 LAB
ALBUMIN SERPL-MCNC: 4 G/DL (ref 3.5–5.2)
ALP BLD-CCNC: 72 U/L (ref 35–104)
ALT SERPL-CCNC: 12 U/L (ref 0–32)
ANION GAP SERPL CALCULATED.3IONS-SCNC: 14 MMOL/L (ref 7–16)
AST SERPL-CCNC: 19 U/L (ref 0–31)
BACTERIA: ABNORMAL /HPF
BASOPHILS ABSOLUTE: 0.04 E9/L (ref 0–0.2)
BASOPHILS RELATIVE PERCENT: 0.3 % (ref 0–2)
BILIRUB SERPL-MCNC: 0.2 MG/DL (ref 0–1.2)
BILIRUBIN URINE: NEGATIVE
BLOOD, URINE: ABNORMAL
BUN BLDV-MCNC: 6 MG/DL (ref 6–20)
CALCIUM SERPL-MCNC: 10.2 MG/DL (ref 8.6–10.2)
CHLORIDE BLD-SCNC: 103 MMOL/L (ref 98–107)
CLARITY: CLEAR
CO2: 20 MMOL/L (ref 22–29)
COLOR: YELLOW
CREAT SERPL-MCNC: 0.6 MG/DL (ref 0.5–1)
EOSINOPHILS ABSOLUTE: 0.21 E9/L (ref 0.05–0.5)
EOSINOPHILS RELATIVE PERCENT: 1.8 % (ref 0–6)
EPITHELIAL CELLS, UA: ABNORMAL /HPF
GFR AFRICAN AMERICAN: >60
GFR NON-AFRICAN AMERICAN: >60 ML/MIN/1.73
GLUCOSE BLD-MCNC: 118 MG/DL (ref 74–99)
GLUCOSE URINE: NEGATIVE MG/DL
HCT VFR BLD CALC: 36.2 % (ref 34–48)
HEMOGLOBIN: 12.8 G/DL (ref 11.5–15.5)
IMMATURE GRANULOCYTES #: 0.13 E9/L
IMMATURE GRANULOCYTES %: 1.1 % (ref 0–5)
KETONES, URINE: >=80 MG/DL
LEUKOCYTE ESTERASE, URINE: NEGATIVE
LYMPHOCYTES ABSOLUTE: 2.17 E9/L (ref 1.5–4)
LYMPHOCYTES RELATIVE PERCENT: 18.2 % (ref 20–42)
MCH RBC QN AUTO: 32 PG (ref 26–35)
MCHC RBC AUTO-ENTMCNC: 35.4 % (ref 32–34.5)
MCV RBC AUTO: 90.5 FL (ref 80–99.9)
MONOCYTES ABSOLUTE: 0.85 E9/L (ref 0.1–0.95)
MONOCYTES RELATIVE PERCENT: 7.1 % (ref 2–12)
NEUTROPHILS ABSOLUTE: 8.51 E9/L (ref 1.8–7.3)
NEUTROPHILS RELATIVE PERCENT: 71.5 % (ref 43–80)
NITRITE, URINE: NEGATIVE
PDW BLD-RTO: 12.1 FL (ref 11.5–15)
PH UA: 6 (ref 5–9)
PLATELET # BLD: 343 E9/L (ref 130–450)
PMV BLD AUTO: 9.7 FL (ref 7–12)
POTASSIUM REFLEX MAGNESIUM: 3.8 MMOL/L (ref 3.5–5)
PROTEIN UA: NEGATIVE MG/DL
RBC # BLD: 4 E12/L (ref 3.5–5.5)
RBC UA: ABNORMAL /HPF (ref 0–2)
SODIUM BLD-SCNC: 137 MMOL/L (ref 132–146)
SPECIFIC GRAVITY UA: 1.01 (ref 1–1.03)
TOTAL PROTEIN: 7.5 G/DL (ref 6.4–8.3)
UROBILINOGEN, URINE: 0.2 E.U./DL
WBC # BLD: 11.9 E9/L (ref 4.5–11.5)
WBC UA: ABNORMAL /HPF (ref 0–5)

## 2021-07-13 PROCEDURE — 70450 CT HEAD/BRAIN W/O DYE: CPT

## 2021-07-13 PROCEDURE — 80053 COMPREHEN METABOLIC PANEL: CPT

## 2021-07-13 PROCEDURE — 2580000003 HC RX 258: Performed by: STUDENT IN AN ORGANIZED HEALTH CARE EDUCATION/TRAINING PROGRAM

## 2021-07-13 PROCEDURE — 96374 THER/PROPH/DIAG INJ IV PUSH: CPT

## 2021-07-13 PROCEDURE — 81001 URINALYSIS AUTO W/SCOPE: CPT

## 2021-07-13 PROCEDURE — 99284 EMERGENCY DEPT VISIT MOD MDM: CPT

## 2021-07-13 PROCEDURE — 96375 TX/PRO/DX INJ NEW DRUG ADDON: CPT

## 2021-07-13 PROCEDURE — 6360000002 HC RX W HCPCS: Performed by: STUDENT IN AN ORGANIZED HEALTH CARE EDUCATION/TRAINING PROGRAM

## 2021-07-13 PROCEDURE — 85025 COMPLETE CBC W/AUTO DIFF WBC: CPT

## 2021-07-13 RX ORDER — METOCLOPRAMIDE HYDROCHLORIDE 5 MG/ML
10 INJECTION INTRAMUSCULAR; INTRAVENOUS ONCE
Status: COMPLETED | OUTPATIENT
Start: 2021-07-13 | End: 2021-07-13

## 2021-07-13 RX ORDER — DIPHENHYDRAMINE HYDROCHLORIDE 50 MG/ML
25 INJECTION INTRAMUSCULAR; INTRAVENOUS ONCE
Status: COMPLETED | OUTPATIENT
Start: 2021-07-13 | End: 2021-07-13

## 2021-07-13 RX ORDER — 0.9 % SODIUM CHLORIDE 0.9 %
1000 INTRAVENOUS SOLUTION INTRAVENOUS ONCE
Status: COMPLETED | OUTPATIENT
Start: 2021-07-13 | End: 2021-07-13

## 2021-07-13 RX ADMIN — METOCLOPRAMIDE HYDROCHLORIDE 10 MG: 5 INJECTION INTRAMUSCULAR; INTRAVENOUS at 22:15

## 2021-07-13 RX ADMIN — DIPHENHYDRAMINE HYDROCHLORIDE 25 MG: 50 INJECTION INTRAMUSCULAR; INTRAVENOUS at 22:15

## 2021-07-13 RX ADMIN — SODIUM CHLORIDE 1000 ML: 9 INJECTION, SOLUTION INTRAVENOUS at 22:14

## 2021-07-13 ASSESSMENT — ENCOUNTER SYMPTOMS
EYE REDNESS: 0
EYE DISCHARGE: 0
COUGH: 0
ABDOMINAL DISTENTION: 0
WHEEZING: 0
SORE THROAT: 0
NAUSEA: 0
VOMITING: 0
SHORTNESS OF BREATH: 0
EYE PAIN: 0
SINUS PRESSURE: 0
BACK PAIN: 0
DIARRHEA: 0

## 2021-07-13 ASSESSMENT — PAIN DESCRIPTION - LOCATION: LOCATION: HEAD

## 2021-07-13 ASSESSMENT — PAIN DESCRIPTION - PAIN TYPE: TYPE: ACUTE PAIN

## 2021-07-13 ASSESSMENT — PAIN DESCRIPTION - DESCRIPTORS: DESCRIPTORS: ACHING

## 2021-07-13 ASSESSMENT — PAIN SCALES - GENERAL: PAINLEVEL_OUTOF10: 10

## 2021-07-14 NOTE — ED PROVIDER NOTES
Chief Complaint   Patient presents with    Dizziness    Migraine    Blurred Vision       Patient is a 42-year-old female with a history of multiple clotting disorders on Lovenox who is 13 weeks pregnant presents today for headache. She has a history of chronic headaches. States this is slightly different than baseline. Has associated blurry vision. Has tried Tylenol earlier without improvement of her symptoms. Denies numbness, tingling or weakness in her extremities. Denies recent fall, trauma or injury. Has no abdominal pain. Has no complications with this pregnancy. On arrival is alert and oriented x3, in no acute distress. The history is provided by the patient. No  was used. Review of Systems   Constitutional: Negative for chills and fever. HENT: Negative for ear pain, sinus pressure and sore throat. Eyes: Positive for visual disturbance. Negative for pain, discharge and redness. Respiratory: Negative for cough, shortness of breath and wheezing. Cardiovascular: Negative for chest pain, palpitations and leg swelling. Gastrointestinal: Negative for abdominal distention, diarrhea, nausea and vomiting. Genitourinary: Negative for dysuria and frequency. Musculoskeletal: Negative for arthralgias and back pain. Skin: Negative for rash and wound. Neurological: Positive for headaches. Negative for dizziness, tremors, seizures, syncope, speech difficulty, weakness, light-headedness and numbness. Hematological: Negative for adenopathy. All other systems reviewed and are negative. Physical Exam  Vitals and nursing note reviewed. Constitutional:       General: She is not in acute distress. Appearance: Normal appearance. She is well-developed. She is not ill-appearing. HENT:      Head: Normocephalic and atraumatic. Eyes:      Extraocular Movements: Extraocular movements intact.       Conjunctiva/sclera: Conjunctivae normal.      Pupils: Pupils are equal, round, and reactive to light. Comments: No nystagmus   Cardiovascular:      Rate and Rhythm: Normal rate and regular rhythm. Pulses: Normal pulses. Heart sounds: Normal heart sounds. Pulmonary:      Effort: Pulmonary effort is normal. No respiratory distress. Breath sounds: Normal breath sounds. No wheezing or rales. Abdominal:      General: Bowel sounds are normal.      Palpations: Abdomen is soft. Tenderness: There is no abdominal tenderness. There is no guarding or rebound. Musculoskeletal:      Cervical back: Normal range of motion and neck supple. Right lower leg: No edema. Left lower leg: No edema. Skin:     General: Skin is warm and dry. Capillary Refill: Capillary refill takes less than 2 seconds. Neurological:      Mental Status: She is alert and oriented to person, place, and time. Cranial Nerves: No cranial nerve deficit.       Coordination: Coordination normal.      Comments: Muscle strength 5/5 in upper and lower extremities bilaterally  Sensation intact to light touch in upper and lower extremities bilaterally  Alert and oriented x3            Procedures     Labs Reviewed   CBC WITH AUTO DIFFERENTIAL - Abnormal; Notable for the following components:       Result Value    WBC 11.9 (*)     MCHC 35.4 (*)     Lymphocytes % 18.2 (*)     Neutrophils Absolute 8.51 (*)     All other components within normal limits   COMPREHENSIVE METABOLIC PANEL W/ REFLEX TO MG FOR LOW K - Abnormal; Notable for the following components:    CO2 20 (*)     Glucose 118 (*)     All other components within normal limits   URINALYSIS - Abnormal; Notable for the following components:    Ketones, Urine >=80 (*)     Blood, Urine SMALL (*)     All other components within normal limits   MICROSCOPIC URINALYSIS - Abnormal; Notable for the following components:    Bacteria, UA RARE (*)     All other components within normal limits     CT Head WO Contrast   Final Result   No acute intracranial abnormality. MDM  Number of Diagnoses or Management Options  13 weeks gestation of pregnancy  Chronic nonintractable headache, unspecified headache type  Diagnosis management comments: Patient is a 80-year-old female with a history of clotting disorder who is 13 weeks pregnant who presents today for headache, blurry vision. Physical exam shows no neurologic deficits. She is neurovascularly intact. Remainder physical exam is normal.  Lab work and imaging was obtained. Labs are within normal limits. CT of the head was obtained as patient is on Lovenox and endorsing headache. CT head shows no acute intracranial abnormalities. Patient was given headache cocktail in the department improvement of her symptoms. With benign physical exam, normal lab work and CT imaging, patient will be discharged home and instructed to follow-up with her OB as well as PCP. Strict return precautions given. She understands and agrees with plan. ED Course as of Jul 13 2343   Tue Jul 13, 2021 2337 Patient is resting comfortably in bed, in no acute distress. Headache has resolved     [JH]      ED Course User Index  [JH] Mook Guerra, DO       --------------------------------------------- PAST HISTORY ---------------------------------------------  Past Medical History:  has a past medical history of Anxiety, Asthma, Blood clot embolism during pregnancy, antepartum, Cerebrovascular accident (CVA) due to vascular occlusion ([address] Chronic migraine, Coagulopathy ([address] DVT of leg (deep venous thrombosis) ([address] DVT of upper extremity (deep vein thrombosis) ([address] Heterozygous for BERT-1 4G allele, Low back pain, MTHFR mutation, POTS (postural orthostatic tachycardia syndrome), and Shingles. Past Surgical History:  has a past surgical history that includes Wrist fracture surgery (Right);  Nerve Block (Left, 08/02/2018); pr linda nose/cleft lip/tip (Left, 08/02/2018); and Dilation and curettage of uterus. Social History:  reports that she has been smoking cigarettes. She has never used smokeless tobacco. She reports previous alcohol use. She reports that she does not use drugs. Family History: family history includes Deep Vein Thrombosis in her maternal grandmother; Diabetes in her father; Stroke in her paternal uncle; Substance Abuse in her brother. The patients home medications have been reviewed.     Allergies: Latex, Prednisone, and Prozac [fluoxetine hcl]    -------------------------------------------------- RESULTS -------------------------------------------------  Labs:  Results for orders placed or performed during the hospital encounter of 07/13/21   CBC Auto Differential   Result Value Ref Range    WBC 11.9 (H) 4.5 - 11.5 E9/L    RBC 4.00 3.50 - 5.50 E12/L    Hemoglobin 12.8 11.5 - 15.5 g/dL    Hematocrit 36.2 34.0 - 48.0 %    MCV 90.5 80.0 - 99.9 fL    MCH 32.0 26.0 - 35.0 pg    MCHC 35.4 (H) 32.0 - 34.5 %    RDW 12.1 11.5 - 15.0 fL    Platelets 682 092 - 361 E9/L    MPV 9.7 7.0 - 12.0 fL    Neutrophils % 71.5 43.0 - 80.0 %    Immature Granulocytes % 1.1 0.0 - 5.0 %    Lymphocytes % 18.2 (L) 20.0 - 42.0 %    Monocytes % 7.1 2.0 - 12.0 %    Eosinophils % 1.8 0.0 - 6.0 %    Basophils % 0.3 0.0 - 2.0 %    Neutrophils Absolute 8.51 (H) 1.80 - 7.30 E9/L    Immature Granulocytes # 0.13 E9/L    Lymphocytes Absolute 2.17 1.50 - 4.00 E9/L    Monocytes Absolute 0.85 0.10 - 0.95 E9/L    Eosinophils Absolute 0.21 0.05 - 0.50 E9/L    Basophils Absolute 0.04 0.00 - 0.20 E9/L   Comprehensive Metabolic Panel w/ Reflex to MG   Result Value Ref Range    Sodium 137 132 - 146 mmol/L    Potassium reflex Magnesium 3.8 3.5 - 5.0 mmol/L    Chloride 103 98 - 107 mmol/L    CO2 20 (L) 22 - 29 mmol/L    Anion Gap 14 7 - 16 mmol/L    Glucose 118 (H) 74 - 99 mg/dL    BUN 6 6 - 20 mg/dL    CREATININE 0.6 0.5 - 1.0 mg/dL    GFR Non-African American >60 >=60 mL/min/1.73    GFR African American >60     Calcium 10.2 8.6 - 10.2 mg/dL    Total Protein 7.5 6.4 - 8.3 g/dL    Albumin 4.0 3.5 - 5.2 g/dL    Total Bilirubin 0.2 0.0 - 1.2 mg/dL    Alkaline Phosphatase 72 35 - 104 U/L    ALT 12 0 - 32 U/L    AST 19 0 - 31 U/L   Urinalysis, reflex to microscopic   Result Value Ref Range    Color, UA Yellow Straw/Yellow    Clarity, UA Clear Clear    Glucose, Ur Negative Negative mg/dL    Bilirubin Urine Negative Negative    Ketones, Urine >=80 (A) Negative mg/dL    Specific Gravity, UA 1.015 1.005 - 1.030    Blood, Urine SMALL (A) Negative    pH, UA 6.0 5.0 - 9.0    Protein, UA Negative Negative mg/dL    Urobilinogen, Urine 0.2 <2.0 E.U./dL    Nitrite, Urine Negative Negative    Leukocyte Esterase, Urine Negative Negative   Microscopic Urinalysis   Result Value Ref Range    WBC, UA NONE 0 - 5 /HPF    RBC, UA 0-1 0 - 2 /HPF    Epithelial Cells, UA NONE SEEN /HPF    Bacteria, UA RARE (A) None Seen /HPF       Radiology:  CT Head WO Contrast   Final Result   No acute intracranial abnormality.             ------------------------- NURSING NOTES AND VITALS REVIEWED ---------------------------  Date / Time Roomed:  7/13/2021  9:38 PM  ED Bed Assignment:  01/01    The nursing notes within the ED encounter and vital signs as below have been reviewed. BP (!) 169/86   Pulse 89   Temp 97.2 °F (36.2 °C) (Infrared)   Resp 18   Ht 5' 6\" (1.676 m)   Wt 265 lb (120.2 kg)   LMP 04/01/2021   SpO2 100%   BMI 42.77 kg/m²   Oxygen Saturation Interpretation: Normal      ------------------------------------------ PROGRESS NOTES ------------------------------------------  I have spoken with the patient and discussed todays results, in addition to providing specific details for the plan of care and counseling regarding the diagnosis and prognosis. Their questions are answered at this time and they are agreeable with the plan. I discussed at length with them reasons for immediate return here for re evaluation.  They will followup with primary care by calling their office tomorrow. --------------------------------- ADDITIONAL PROVIDER NOTES ---------------------------------  At this time the patient is without objective evidence of an acute process requiring hospitalization or inpatient management. They have remained hemodynamically stable throughout their entire ED visit and are stable for discharge with outpatient follow-up. The plan has been discussed in detail and they are aware of the specific conditions for emergent return, as well as the importance of follow-up. New Prescriptions    No medications on file       Diagnosis:  1. Chronic nonintractable headache, unspecified headache type    2. 13 weeks gestation of pregnancy        Disposition:  Patient's disposition: Discharge to home  Patient's condition is stable.             Chino Choudhury,   Resident  07/13/21 Louann Drummond DO  Resident  07/13/21 [id number]

## 2021-07-14 NOTE — PROGRESS NOTES
Radiology Procedure Waiver   Name: Jermaine Buckner  : 1990  MRN: [id number]    Date:  21    Time: 10:35 PM EDT    Benefits of immediately proceeding with Radiology exam(s) without pre-testing outweigh the risks or are not indicated as specified below and therefore the following is/are being waived:    [x] Pregnancy test   [] Patients LMP on-time and regular.   [] Patient had Tubal Ligation or has other Contraception Device. [] Patient  is Menopausal or Premenarcheal.    [] Patient had Full or Partial Hysterectomy. [] Protocol for Iodine allergy    [] MRI Questionnaire     [] BUN/Creatinine   [] Patient age w/no hx of renal dysfunction. [] Patient on Dialysis. [] Recent Normal Labs.   Electronically signed by Asia Shelton DO on 21 at 10:35 PM EDT

## 2021-09-01 NOTE — PROGRESS NOTES
[address] [facility]. [facility]., M.D., F.A.C.P. Tobias Pickering, FRANCISCO, APRN, CNS  Seth Colón. John Mejía, MSN, APRN-FNP-C  Danette Mar MSN, APRN, FNP-C  Victorina LONG, PA-C  [address] MSN, APRN, FNP-C  [address], [address]  [place]elissa staley, [address] Yari Christianson  Phone: [phone]  Fax: [phone]         Vasiliy Serrano is a 27 y.o. female evaluated via telephone on 9/2/2021. The patient and/or health care decision maker is aware that that he/she may receive a bill for this telephone service, depending on his/her insurance coverage, and has provided verbal consent to proceed: Yes    I affirm this is a Patient Initiated Episode with an Established Patient who has not had a related appointment within my department in the past 7 days or scheduled within the next 24 hours. The patient's identity was verified at the start of the call. Others involved in call: no one    Total Time: minutes: 5-10 minutes    Note: not billable if this call serves to triage the patient into an appointment for the relevant concern    HPI:     We are following her for stroke and migraines    She lost her ride today and had to transition to a phone visit. Three days after her last office visit, she found out that she was pregnant, and is currently 21 weeks along. She is a high risk pregnancy and has had some placenta previa. She is still taking Emgality and Nurtec, which she finds beneficial--she was having about 5 migraines per month on this regimen. . Reportedly her high risk OB/GYN told her it was okay to take Emgality. She never started gabapentin due to her pregnancy and notes persistent dysesthesias in her left lower extremity she was weaned successfully from topiramate    She has not established with EP yet and her PCP has been managing her POTS medications--on mestinon. She does not plan to breast-feed.     Medications:     Prior to Admission medications    Medication Sig Start Date End Date Taking?  Authorizing Provider   enoxaparin (LOVENOX) 100 MG/ML injection Inject 100 mg into the skin daily 5/18/21  Yes Historical Provider, MD   Prenatal Vit-DSS-Fe Cbn-FA (PRENATAL AD PO) Take by mouth   Yes Historical Provider, MD   diphenhydrAMINE (BENADRYL) 25 MG tablet Take 25 mg by mouth nightly as needed for Sleep   Yes Historical Provider, MD   aspirin 81 MG EC tablet Take 81 mg by mouth 2 times daily   Yes Historical Provider, MD   Cholecalciferol (VITAMIN D3) 50 MCG (2000 UT) TABS Take one tablet daily 6/18/21  Yes Jazmine Ramos DO   albuterol sulfate HFA (VENTOLIN HFA) 108 (90 Base) MCG/ACT inhaler Inhale 2 puffs into the lungs every 6 hours as needed for Wheezing 4/15/21  Yes Jazmine Ramos DO   folic acid (FOLVITE) 1 MG tablet TAKE 1 TABLET BY MOUTH ONCE DAILY  Patient taking differently: Take 3 mg by mouth daily  4/1/21  Yes Jazmine Ramos DO   pyridostigmine (MESTINON) 60 MG tablet Take 1 tablet by mouth 3 times daily 8/28/17  Yes Jazmine Ramos DO   citalopram (CELEXA) 20 MG tablet Take 20 mg by mouth daily   Yes Historical Provider, MD   acetaminophen (TYLENOL) 500 MG tablet Take 500 mg by mouth every 6 hours as needed    Yes Historical Provider, MD     Objective:     Mental Status Exam: sounds alert, oriented x4; thought processes appropriate    Speech sounds clear    Breathing Effort sounds normal    Laboratory/Radiology:     CBC with Differential:    Lab Results   Component Value Date    WBC 11.9 07/13/2021    RBC 4.00 07/13/2021    HGB 12.8 07/13/2021    HCT 36.2 07/13/2021     07/13/2021    MCV 90.5 07/13/2021    MCH 32.0 07/13/2021    MCHC 35.4 07/13/2021    RDW 12.1 07/13/2021    LYMPHOPCT 18.2 07/13/2021    MONOPCT 7.1 07/13/2021    BASOPCT 0.3 07/13/2021    MONOSABS 0.85 07/13/2021    LYMPHSABS 2.17 07/13/2021    EOSABS 0.21 07/13/2021    BASOSABS 0.04 07/13/2021     CMP:    Lab Results   Component Value Date     07/13/2021    K 3.8 07/13/2021     07/13/2021    CO2 20 07/13/2021    BUN 6 07/13/2021    CREATININE 0.6 07/13/2021    GFRAA >60 07/13/2021    LABGLOM >60 07/13/2021    GLUCOSE 118 07/13/2021    PROT 7.5 07/13/2021    LABALBU 4.0 07/13/2021    CALCIUM 10.2 07/13/2021    BILITOT 0.2 07/13/2021    ALKPHOS 72 07/13/2021    AST 19 07/13/2021    ALT 12 07/13/2021     Hepatic Function Panel:    Lab Results   Component Value Date    ALKPHOS 72 07/13/2021    ALT 12 07/13/2021    AST 19 07/13/2021    PROT 7.5 07/13/2021    BILITOT 0.2 07/13/2021    BILIDIR <0.2 12/14/2016    IBILI 0.2 12/14/2016    LABALBU 4.0 07/13/2021     CT head July 2021: no acute changes    All labs and images personally reviewed today    Assessment:     Hx right hemispheric stroke: with residual left sided weakness and spasticity. Etiology hypercoagulable state from clotting disorder (also on OCP and was smoking). Unfortunately, she cannot be on gabapentin d/t her pregnancy, but this medication works for her her L sided dysesthesias. Episodic migraines with aura: Unfortunately, I do not feel with the limited data that is available that she should remain on Emgality and Nurtec during pregnancy, though these medications have been effective for her (reducing her HAs to 5 per month).     POTS: EP input pending    Hypercoagulopathy: on [address] and ASA and follows with [facility]    Plan:     Stop Emgality and Nurtec until after she gives birth    Can restart gabapentin after she gives birth    EP referral pending    Continue OAC--now also on ASA BID    Resume statin after pregnancy    Tylenol and conservative tx for migraines for now    Stroke s/s reviewed    Call for appt after she gives birth    ELADIO Alex - CNP  1:56 PM  9/2/2021

## 2021-09-02 ENCOUNTER — VIRTUAL VISIT (OUTPATIENT)
Dept: NEUROLOGY | Age: 31
End: 2021-09-02
Payer: MEDICARE

## 2021-09-02 DIAGNOSIS — G43.009 MIGRAINE WITHOUT AURA AND WITHOUT STATUS MIGRAINOSUS, NOT INTRACTABLE: ICD-10-CM

## 2021-09-02 DIAGNOSIS — I69.30 PERSONAL HISTORY OF STROKE WITH RESIDUAL EFFECTS: Primary | ICD-10-CM

## 2021-09-02 PROCEDURE — 99441 PR PHYS/QHP TELEPHONE EVALUATION 5-10 MIN: CPT | Performed by: NURSE PRACTITIONER

## 2021-10-09 ENCOUNTER — HOSPITAL ENCOUNTER (OUTPATIENT)
Age: 31
Discharge: HOME | End: 2021-10-09
Payer: MEDICARE

## 2021-10-09 LAB
GLUCOSE TOLERANCE TEST 1 HOUR: 246 MG/DL
GLUCOSE TOLERANCE TEST 2 HOUR: 166 MG/DL
GLUCOSE TOLERANCE TEST FASTING: 127 MG/DL

## 2021-10-09 PROCEDURE — 82951 GLUCOSE TOLERANCE TEST (GTT): CPT

## 2021-10-09 PROCEDURE — 36415 COLL VENOUS BLD VENIPUNCTURE: CPT

## 2021-10-13 ENCOUNTER — HOSPITAL ENCOUNTER (OUTPATIENT)
Dept: DIABETES SERVICES | Age: 31
Setting detail: THERAPIES SERIES
Discharge: HOME | End: 2021-10-13
Payer: MEDICARE

## 2021-10-13 VITALS — HEIGHT: 66 IN | BODY MASS INDEX: 45.8 KG/M2 | WEIGHT: 285 LBS

## 2021-10-13 PROCEDURE — G0108 DIAB MANAGE TRN  PER INDIV: HCPCS

## 2021-10-13 ASSESSMENT — PROBLEM AREAS IN DIABETES QUESTIONNAIRE (PAID)
FEELING SCARED WHEN YOU THINK ABOUT LIVING WITH DIABETES: 0
FEELING DEPRESSED WHEN YOU THINK ABOUT LIVING WITH DIABETES: 0
WORRYING ABOUT THE FUTURE AND THE POSSIBILITY OF SERIOUS COMPLICATIONS: 0
FEELING THAT DIABETES IS TAKING UP TOO MUCH OF YOUR MENTAL AND PHYSICAL ENERGY EVERY DAY: 0
PAID-5 TOTAL SCORE: 0
COPING WITH COMPLICATIONS OF DIABETES: 0

## 2021-10-13 NOTE — PROGRESS NOTES
Diabetes Self-Management Education Record    Participant Name: Buck Ontiveros  Referring Provider: Ella Devries DO  Assessment/Evaluation Ratings:  1=Needs Instruction   4=Demonstrates Understanding/Competency  2=Needs Review   NC=Not Covered    3=Comprehends Key Points  N/A=Not Applicable  Topics/Learning Objectives Pre-session Assess Date:  Instructor initials/date  10/13/21KC Instr. Date    Instructor initials/date  10/13/21KC Follow-up Post- session Eval Comments   Diabetes disease process & Treatment process:   -Define type of diabetes in simple terms.  - Describe the ABCs of  diabetes management  -Identify own type of diabetes  -Identify lifestyle changes/treatment options  -other:  2 [x] All     []  []  []  []  []  3 10/13/21KC   Developing strategies for Healthy coping/psychosocial issues:    -Describe feelings about living with diabetes  -Identify coping strategies and sources of stress  -Identify support needed & support network available  -Complete PAID-5 Diabetes questionnaire 1 [] All     []  []  []    [x]  3 10/13/21KC: PAID 5=0   Prevention, detection & treatment of Chronic complications:    -Identify the prevention, detection and treatment for complications including immunizations, preventive eye, foot, dental and renal exams as indicated per the participant's duration of diabetes and health status.  -Define the natural course of diabetes and the relationship of blood glucose levels to long term complications of diabetes.   2 [] All     []            [x]  3 10/13/21KC: r/t GDM   Prevention, detection & treatment of acute complications:    -State the causes,signs & symptoms of hyper & hypoglycemia, and prevention & treatment strategies.   -Describe sick day guidelines  DKA /indications for ketone testing &  when to call physician  3 [] All     []      []        -Identify severe weather/situation crisis  & diabetes supplies management  []      Using medications safely:   -State effects of diabetes medicines on blood glucose levels;  -List diabetes medication taken, action & side effects n/a [] All     []  []      Insulin/Injectables/glucagon  -Name appropriate injection sites; proper storage; supplies needed;  n/a   []       Demonstrate proper technique  []      Monitoring blood glucose, interpreting and using results:   -Identify the purpose of testing   -Identify recommended & personal blood glucose targets & HgbA1C target levels  -State the Importance of logging blood glucose levels for pattern recognition;   -State benefits of reading/using pt generated health data  -Verbalize safe lancet disposal 3 [] All     []  []    []  []  []      -Demonstrate proper testing technique  []      Incorporating physical activity into lifestyle:   -State effect of exercise on blood glucose levels;   -State benefits of regular exercise;   -Define safety considerations/food choices if needed.  -Describe contraindications/maintenance of activity. n/a [] All     []  []    []  []   10/13/21KC: Pt on bed rest   Incorporating nutritional management into lifestyle:   -Describe effect of type, amount & timing of food on blood glucose  -Describe methods for preparing and planning healthy meals  -Correctly read food labels  -Name 3 foods high in Carbohydrate 1 [x] All       []    []    []  []  4 10/13/21KC: Instructed pt on 2000 calorie diet with 14 total carbohydrate servings per day. 2 carbohydrate choices for breakfast, 3 carbohydrate choices for lunch, 3 carbohydrate choices for dinner, 2 carbohydrate choices for AM/PM/HS snack. Discussed recommended weight goals and water intakes. Discussed different types of carbohydrates and choosing higher quality carbohydrates with more fiber. Educated pt on lean proteins and low fat. Educated pt on label reading and how to apply grams of total carbohydrates into carbohydrate servings to comply with meal plan. Encouraged pt to use measuring cups for portion control.  Instructed pt to call with any questions.    -Plan a carbohydrate-controlled meal based on individualized meal plan  -Demonstrate CHO counting/portion control   []  []      Developing strategies for problem solving to promote health/change behavior. -Identify 7 self-care behaviors; Personal health risk factors; Benefits, challenges & strategies for behavioral change and set an individualized goal selection. 1       [x]  3 10/13/21kc  [x]Nutrition  [x]Monitoring  []Exercise  []Medication  []Other     Identified Barriers to learning/adherence to self management plan:    None  []  other    Instruction Method:  Lecture/Discussion and Handouts    Supporting Education Materials/Equipment Provided: Meal Plan, Nutritional Packet and Gestational Pathway Booklet   []Frisian materials       [] services     []Other:      Encounter Type Date Attended Start Time End Time Comments No Show Dates   Assessment          Session 1         Session 2        1:1 DSMES          In person Follow-up         Gestational Diabetes 10/13/21KC 1300 1400 In person    Individual MNT        Meter Instrx        Insulin Instrx           Additional Comments: [] Pt seen individually due to Covid-19 Safety precautions and no group session available.         Date:   Follow-up goal attainment based on patients initial DSMES goal    Dr Notified by [] EMR []Fax        []Post class Hgb A1C  []Medication compliance   []Plate method/meal plan compliance   []Able to state the number of Carbohydrate servings eaten at B,L,D   []Testing blood glucose as prescribed by PCP   []Exercise Routine   []Other:   []Other:     []Patient lost to follow-up  Dr Notified by []EMR []Fax     Personal Support Plan:      [] Keep all scheduled doctor appointments   [] Make and keep appointments with specialists (foot, eye, dentist) as recommended   [] Consult my pharmacist about all new medications or to ask any medication questions   [] Get tested for sleep apnea   [] Seek help for: [] Make an appointment with:   [] Attend smoking cessation classes or call 1-800-QUIT-NOW  [] Attend Diabetes Support Group   [] Use diabetes magazines, books, or credible web-sites like the ADA for more information  [] Increase exercise at home or join an exercise program:   [] Other:

## 2021-10-13 NOTE — LETTER
[facility])  - Diabetes Education    10/13/2021     Re:     Fede Cazares  :  1990    Dear Dr. Allison Minium: Thank you for referring your patient, Fede Cazares, for diabetes education. Your patient was seen on 10/13/21 ,and has completed their personalized initial comprehensive education plan. The education plan included the following topics: Disease Process, Nutrition, Exercise, Blood Glucose Monitoring, Acute/chronic complications, Behavioral and Lifestyle Change, Healthy Coping and goal setting. The following services were also completed:    [x]  Diet instruction on: 2000 calorie ADA      Upon completion of these sessions, the diabetes teaching team made the following evaluation of your patient's progress:          ASSESSMENT    [x]  very attentive to teaching  [x]  answered questions appropriately when asked  [x]  seems able to apply concepts to daily lifestyle  [x]  seems motivated to do well  [x]  verbalized an understanding of meal plan  [x]  expresses an intent to comply with meal plan  [x]  worked out meal timing adjustment according to work/schedule/lifestyle    COMMENTS:    10/13/21KC: Instructed pt on 2000 calorie diet with 14 total carbohydrate servings per day. 2 carbohydrate choices for breakfast, 3 carbohydrate choices for lunch, 3 carbohydrate choices for dinner, 2 carbohydrate choices for AM/PM/HS snack. Discussed recommended weight goals and water intakes. Discussed different types of carbohydrates and choosing higher quality carbohydrates with more fiber. Educated pt on lean proteins and low fat. Educated pt on label reading and how to apply grams of total carbohydrates into carbohydrate servings to comply with meal plan. Encouraged pt to use measuring cups for portion control. Instructed pt to call with any questions. GOAL  [x]  To count carbohydrate servings at each meal as instructed.   [x]  to measure blood glucose   []  to inject insulin in the stomach area  []  to log glucose results and bring to physicians office at each scheduled appointment      DIABETES SELF-MANAGEMENT SUPPORT PLAN/REFERRALS:    []  Prescription Assistance  []  Resource Mother[facility]  []  [facility] Program  []  Financial Counselor  []    []  Dentist  []  Keep all scheduled Dr appointments  []  Smoking Cessation Classes    Thank you for referring this patient to our program.  Please do not hesitate to call if you have any questions at ( (RAGINI or JULITA) or [phone] ([address] [place] [address]).         Sincerely,    [facility]) Diabetes Education Department  American Diabetes Association Recognized DSMES Program

## 2021-10-20 ENCOUNTER — HOSPITAL ENCOUNTER (OUTPATIENT)
Age: 31
Discharge: HOME | End: 2021-10-20
Attending: OBSTETRICS & GYNECOLOGY | Admitting: OBSTETRICS & GYNECOLOGY
Payer: MEDICARE

## 2021-10-20 VITALS
SYSTOLIC BLOOD PRESSURE: 141 MMHG | TEMPERATURE: 98.5 F | RESPIRATION RATE: 18 BRPM | HEART RATE: 108 BPM | DIASTOLIC BLOOD PRESSURE: 75 MMHG

## 2021-10-20 PROBLEM — O26.90 COMPLICATED PREGNANCY, UNSPECIFIED TRIMESTER: Status: ACTIVE | Noted: 2021-10-20

## 2021-10-20 LAB
ALBUMIN SERPL-MCNC: 3.3 G/DL (ref 3.5–5.2)
ALP BLD-CCNC: 59 U/L (ref 35–104)
ALT SERPL-CCNC: 12 U/L (ref 0–32)
ANION GAP SERPL CALCULATED.3IONS-SCNC: 11 MMOL/L (ref 7–16)
AST SERPL-CCNC: 20 U/L (ref 0–31)
BACTERIA: ABNORMAL /HPF
BASOPHILS ABSOLUTE: 0.03 E9/L (ref 0–0.2)
BASOPHILS RELATIVE PERCENT: 0.3 % (ref 0–2)
BILIRUB SERPL-MCNC: <0.2 MG/DL (ref 0–1.2)
BILIRUBIN URINE: NEGATIVE
BLOOD, URINE: ABNORMAL
BUN BLDV-MCNC: 7 MG/DL (ref 6–20)
CALCIUM SERPL-MCNC: 10.6 MG/DL (ref 8.6–10.2)
CHLORIDE BLD-SCNC: 102 MMOL/L (ref 98–107)
CLARITY: CLEAR
CO2: 22 MMOL/L (ref 22–29)
COLOR: YELLOW
CREAT SERPL-MCNC: 0.6 MG/DL (ref 0.5–1)
EOSINOPHILS ABSOLUTE: 0.12 E9/L (ref 0.05–0.5)
EOSINOPHILS RELATIVE PERCENT: 1.2 % (ref 0–6)
EPITHELIAL CELLS, UA: ABNORMAL /HPF
GFR AFRICAN AMERICAN: >60
GFR NON-AFRICAN AMERICAN: >60 ML/MIN/1.73
GLUCOSE BLD-MCNC: 137 MG/DL (ref 74–99)
GLUCOSE URINE: NEGATIVE MG/DL
HCT VFR BLD CALC: 32.1 % (ref 34–48)
HEMOGLOBIN: 10.9 G/DL (ref 11.5–15.5)
IMMATURE GRANULOCYTES #: 0.13 E9/L
IMMATURE GRANULOCYTES %: 1.3 % (ref 0–5)
KETONES, URINE: ABNORMAL MG/DL
LEUKOCYTE ESTERASE, URINE: NEGATIVE
LYMPHOCYTES ABSOLUTE: 1.6 E9/L (ref 1.5–4)
LYMPHOCYTES RELATIVE PERCENT: 16.2 % (ref 20–42)
MCH RBC QN AUTO: 30.6 PG (ref 26–35)
MCHC RBC AUTO-ENTMCNC: 34 % (ref 32–34.5)
MCV RBC AUTO: 90.2 FL (ref 80–99.9)
MONOCYTES ABSOLUTE: 0.8 E9/L (ref 0.1–0.95)
MONOCYTES RELATIVE PERCENT: 8.1 % (ref 2–12)
NEUTROPHILS ABSOLUTE: 7.21 E9/L (ref 1.8–7.3)
NEUTROPHILS RELATIVE PERCENT: 72.9 % (ref 43–80)
NITRITE, URINE: NEGATIVE
PDW BLD-RTO: 13 FL (ref 11.5–15)
PH UA: 6.5 (ref 5–9)
PLATELET # BLD: 274 E9/L (ref 130–450)
PMV BLD AUTO: 9.4 FL (ref 7–12)
POTASSIUM SERPL-SCNC: 4.1 MMOL/L (ref 3.5–5)
PROTEIN UA: NEGATIVE MG/DL
RBC # BLD: 3.56 E12/L (ref 3.5–5.5)
RBC UA: ABNORMAL /HPF (ref 0–2)
SODIUM BLD-SCNC: 135 MMOL/L (ref 132–146)
SPECIFIC GRAVITY UA: 1.01 (ref 1–1.03)
TOTAL PROTEIN: 6.8 G/DL (ref 6.4–8.3)
URIC ACID, SERUM: 4.5 MG/DL (ref 2.4–5.7)
UROBILINOGEN, URINE: 0.2 E.U./DL
WBC # BLD: 9.9 E9/L (ref 4.5–11.5)
WBC UA: ABNORMAL /HPF (ref 0–5)

## 2021-10-20 PROCEDURE — 85025 COMPLETE CBC W/AUTO DIFF WBC: CPT

## 2021-10-20 PROCEDURE — 36415 COLL VENOUS BLD VENIPUNCTURE: CPT

## 2021-10-20 PROCEDURE — 84550 ASSAY OF BLOOD/URIC ACID: CPT

## 2021-10-20 PROCEDURE — 80053 COMPREHEN METABOLIC PANEL: CPT

## 2021-10-20 PROCEDURE — 99211 OFF/OP EST MAY X REQ PHY/QHP: CPT

## 2021-10-20 PROCEDURE — 81001 URINALYSIS AUTO W/SCOPE: CPT

## 2021-10-20 RX ORDER — METOPROLOL SUCCINATE 25 MG/1
12.5 TABLET, EXTENDED RELEASE ORAL NIGHTLY
COMMUNITY
End: 2021-12-10 | Stop reason: SDUPTHER

## 2021-10-20 NOTE — PROGRESS NOTES
28w4d RUBY 21 ambulatory to the L&D unit with complaints of decrease fetal movement and back pain btwn the shoulders. EFM applied. Pt denies any bleeding or leaking fluids. Pt denies any contractions. Call light within reach.

## 2021-10-21 NOTE — H&P
Subjective:      Magdy Grover is an 27 y.o. female at 29 and 4/7 weeks gestation presenting with upper back pain and decreased fetal movement. Patient recently was diagnosed with gestational diabetes and will be started on insulin per Dr. Nimisha Shaikh. Denies any bleeding or fluid leak. Upon admission her blood pressure was found to be elevated and consequently patient was kept for observation. . Fetal Movement: decreased. Past Medical History:   Diagnosis Date    Anxiety     Asthma     Blood clot embolism during pregnancy, antepartum     Cerebrovascular accident (CVA) due to vascular occlusion (HCC)     Chronic migraine     Coagulopathy (HCC)     DVT of leg (deep venous thrombosis) (HCC)     left    DVT of upper extremity (deep vein thrombosis) (HCC)     Left X 2    Heterozygous for BERT-1 4G allele     Low back pain     MTHFR mutation     POTS (postural orthostatic tachycardia syndrome)     Shingles        Review of Systems  Pertinent items are noted in HPI. Objective:      BP (!) 141/75   Pulse 108   Temp 98.5 °F (36.9 °C) (Oral)   Resp 18   LMP 04/01/2021   Blood pressure (!) 141/75, pulse 108, temperature 98.5 °F (36.9 °C), temperature source Oral, resp. rate 18, last menstrual period 04/01/2021, not currently breastfeeding. General:   alert, appears stated age and cooperative   Cervix:   closed.    FHT:   140 BPM     Lab Review    CBC:   Lab Results   Component Value Date    WBC 9.9 10/20/2021    RBC 3.56 10/20/2021    HGB 10.9 10/20/2021    HCT 32.1 10/20/2021    MCV 90.2 10/20/2021    MCH 30.6 10/20/2021    MCHC 34.0 10/20/2021    RDW 13.0 10/20/2021     10/20/2021    MPV 9.4 10/20/2021     CMP:    Lab Results   Component Value Date     10/20/2021    K 4.1 10/20/2021    K 3.8 07/13/2021     10/20/2021    CO2 22 10/20/2021    BUN 7 10/20/2021    CREATININE 0.6 10/20/2021    GFRAA >60 10/20/2021    LABGLOM >60 10/20/2021    GLUCOSE 137 10/20/2021    PROT 6.8 10/20/2021 LABALBU 3.3 10/20/2021    CALCIUM 10.6 10/20/2021    BILITOT <0.2 10/20/2021    ALKPHOS 59 10/20/2021    AST 20 10/20/2021    ALT 12 10/20/2021     U/A:    Lab Results   Component Value Date    COLORU Yellow 10/20/2021    PHUR 6.5 10/20/2021    WBCUA NONE 10/20/2021    RBCUA NONE 10/20/2021    BACTERIA NONE SEEN 10/20/2021    CLARITYU Clear 10/20/2021    SPECGRAV 1.010 10/20/2021    LEUKOCYTESUR Negative 10/20/2021    UROBILINOGEN 0.2 10/20/2021    BILIRUBINUR Negative 10/20/2021    BLOODU MODERATE 10/20/2021    GLUCOSEU Negative 10/20/2021          Assessment:      Back pain in pregnancy  Gestational diabetes  PIH    Plan:      Monitored for contractions - findings: no contractions noted and reactive strip. Orders: [address] labs and fetal monitoring.      Michelle Burk MD,MD,10/20/2021 9:17 PM

## 2021-10-25 ENCOUNTER — HOSPITAL ENCOUNTER (OUTPATIENT)
Age: 31
Discharge: HOME | End: 2021-10-25
Payer: MEDICARE

## 2021-10-25 ENCOUNTER — HOSPITAL ENCOUNTER (OUTPATIENT)
Dept: INFUSION THERAPY | Age: 31
Setting detail: INFUSION SERIES
Discharge: HOME | End: 2021-10-25
Payer: MEDICARE

## 2021-10-25 VITALS
TEMPERATURE: 98.2 F | OXYGEN SATURATION: 99 % | DIASTOLIC BLOOD PRESSURE: 86 MMHG | SYSTOLIC BLOOD PRESSURE: 130 MMHG | RESPIRATION RATE: 18 BRPM | HEART RATE: 101 BPM

## 2021-10-25 LAB
ABO/RH: NORMAL
ALBUMIN SERPL-MCNC: 3.5 G/DL (ref 3.5–5.2)
ALP BLD-CCNC: 65 U/L (ref 35–104)
ALT SERPL-CCNC: 12 U/L (ref 0–32)
ANION GAP SERPL CALCULATED.3IONS-SCNC: 10 MMOL/L (ref 7–16)
ANTIBODY SCREEN: NORMAL
AST SERPL-CCNC: 14 U/L (ref 0–31)
BASOPHILS ABSOLUTE: 0.03 E9/L (ref 0–0.2)
BASOPHILS RELATIVE PERCENT: 0.3 % (ref 0–2)
BILIRUB SERPL-MCNC: <0.2 MG/DL (ref 0–1.2)
BUN BLDV-MCNC: 7 MG/DL (ref 6–20)
CALCIUM SERPL-MCNC: 10 MG/DL (ref 8.6–10.2)
CHLORIDE BLD-SCNC: 103 MMOL/L (ref 98–107)
CO2: 22 MMOL/L (ref 22–29)
CREAT SERPL-MCNC: 0.7 MG/DL (ref 0.5–1)
EOSINOPHILS ABSOLUTE: 0.12 E9/L (ref 0.05–0.5)
EOSINOPHILS RELATIVE PERCENT: 1.2 % (ref 0–6)
GFR AFRICAN AMERICAN: >60
GFR NON-AFRICAN AMERICAN: >60 ML/MIN/1.73
GLUCOSE BLD-MCNC: 90 MG/DL (ref 74–99)
HBA1C MFR BLD: 5.9 % (ref 4–5.6)
HCT VFR BLD CALC: 33.6 % (ref 34–48)
HEMOGLOBIN: 11.2 G/DL (ref 11.5–15.5)
IMMATURE GRANULOCYTES #: 0.15 E9/L
IMMATURE GRANULOCYTES %: 1.5 % (ref 0–5)
LYMPHOCYTES ABSOLUTE: 1.38 E9/L (ref 1.5–4)
LYMPHOCYTES RELATIVE PERCENT: 14 % (ref 20–42)
MCH RBC QN AUTO: 30.4 PG (ref 26–35)
MCHC RBC AUTO-ENTMCNC: 33.3 % (ref 32–34.5)
MCV RBC AUTO: 91.3 FL (ref 80–99.9)
MONOCYTES ABSOLUTE: 0.77 E9/L (ref 0.1–0.95)
MONOCYTES RELATIVE PERCENT: 7.8 % (ref 2–12)
NEUTROPHILS ABSOLUTE: 7.42 E9/L (ref 1.8–7.3)
NEUTROPHILS RELATIVE PERCENT: 75.2 % (ref 43–80)
PDW BLD-RTO: 13.2 FL (ref 11.5–15)
PLATELET # BLD: 300 E9/L (ref 130–450)
PMV BLD AUTO: 9.5 FL (ref 7–12)
POTASSIUM SERPL-SCNC: 3.7 MMOL/L (ref 3.5–5)
RBC # BLD: 3.68 E12/L (ref 3.5–5.5)
RHIG LOT NUMBER: NORMAL
SODIUM BLD-SCNC: 135 MMOL/L (ref 132–146)
T4 FREE: 0.85 NG/DL (ref 0.93–1.7)
TOTAL PROTEIN: 6.9 G/DL (ref 6.4–8.3)
TSH SERPL DL<=0.05 MIU/L-ACNC: 1.49 UIU/ML (ref 0.27–4.2)
WBC # BLD: 9.9 E9/L (ref 4.5–11.5)

## 2021-10-25 PROCEDURE — 96372 THER/PROPH/DIAG INJ SC/IM: CPT

## 2021-10-25 PROCEDURE — 83036 HEMOGLOBIN GLYCOSYLATED A1C: CPT

## 2021-10-25 PROCEDURE — 36415 COLL VENOUS BLD VENIPUNCTURE: CPT

## 2021-10-25 PROCEDURE — 6360000002 HC RX W HCPCS: Performed by: OBSTETRICS & GYNECOLOGY

## 2021-10-25 PROCEDURE — 96365 THER/PROPH/DIAG IV INF INIT: CPT

## 2021-10-25 PROCEDURE — 80053 COMPREHEN METABOLIC PANEL: CPT

## 2021-10-25 PROCEDURE — 86901 BLOOD TYPING SEROLOGIC RH(D): CPT

## 2021-10-25 PROCEDURE — 86900 BLOOD TYPING SEROLOGIC ABO: CPT

## 2021-10-25 PROCEDURE — 84439 ASSAY OF FREE THYROXINE: CPT

## 2021-10-25 PROCEDURE — 85025 COMPLETE CBC W/AUTO DIFF WBC: CPT

## 2021-10-25 PROCEDURE — 86850 RBC ANTIBODY SCREEN: CPT

## 2021-10-25 PROCEDURE — 84443 ASSAY THYROID STIM HORMONE: CPT

## 2021-10-25 RX ADMIN — HUMAN RHO(D) IMMUNE GLOBULIN 300 MCG: 300 INJECTION, SOLUTION INTRAMUSCULAR at 15:10

## 2021-10-27 ENCOUNTER — HOSPITAL ENCOUNTER (OUTPATIENT)
Age: 31
Discharge: HOME | End: 2021-10-27
Payer: MEDICARE

## 2021-10-27 LAB
24HR URINE VOLUME (ML): 2825 ML
CREAT SERPL-MCNC: 0.7 MG/DL (ref 0.5–1)
CREATININE 24 HOUR URINE: 1921 MG/24H (ref 720–1510)
CREATININE CLEARANCE: 190.6 ML/MIN (ref 79–133)
Lab: 24 HOURS
PROTEIN 24 HOUR URINE: 0.62 G/24HR (ref 0–0.14)

## 2021-10-27 PROCEDURE — 84156 ASSAY OF PROTEIN URINE: CPT

## 2021-10-27 PROCEDURE — 82575 CREATININE CLEARANCE TEST: CPT

## 2021-10-27 PROCEDURE — 36415 COLL VENOUS BLD VENIPUNCTURE: CPT

## 2021-11-12 ENCOUNTER — HOSPITAL ENCOUNTER (OUTPATIENT)
Age: 31
Discharge: HOME | End: 2021-11-12
Attending: OBSTETRICS & GYNECOLOGY | Admitting: OBSTETRICS & GYNECOLOGY
Payer: MEDICARE

## 2021-11-12 ENCOUNTER — APPOINTMENT (OUTPATIENT)
Dept: LABOR AND DELIVERY | Age: 31
End: 2021-11-12
Payer: MEDICARE

## 2021-11-12 VITALS
DIASTOLIC BLOOD PRESSURE: 60 MMHG | TEMPERATURE: 98 F | RESPIRATION RATE: 16 BRPM | SYSTOLIC BLOOD PRESSURE: 128 MMHG | HEART RATE: 88 BPM

## 2021-11-12 PROCEDURE — 59025 FETAL NON-STRESS TEST: CPT

## 2021-11-12 NOTE — PROGRESS NOTES
31w6d  EDC 22 ambulatory to L&D for scheduled NST due to GDM. EFM applied. Maternal perception of fetal movement noted. Patient denies bleeding, leaking of fluid, or contractions. Call light in reach.

## 2021-11-12 NOTE — PROGRESS NOTES
Diagnosis:   Diabetes     Result:  Reactive       Plan:  Discharge home    F/U as scheduled      Aziza Bosch MD

## 2021-11-16 ENCOUNTER — VIRTUAL VISIT (OUTPATIENT)
Dept: FAMILY MEDICINE CLINIC | Age: 31
End: 2021-11-16
Payer: MEDICARE

## 2021-11-16 ENCOUNTER — APPOINTMENT (OUTPATIENT)
Dept: LABOR AND DELIVERY | Age: 31
End: 2021-11-16
Payer: MEDICARE

## 2021-11-16 ENCOUNTER — HOSPITAL ENCOUNTER (OUTPATIENT)
Age: 31
Discharge: HOME | End: 2021-11-16
Attending: OBSTETRICS & GYNECOLOGY | Admitting: OBSTETRICS & GYNECOLOGY
Payer: MEDICARE

## 2021-11-16 VITALS — SYSTOLIC BLOOD PRESSURE: 132 MMHG | HEART RATE: 96 BPM | RESPIRATION RATE: 18 BRPM | DIASTOLIC BLOOD PRESSURE: 70 MMHG

## 2021-11-16 DIAGNOSIS — J45.20 MILD INTERMITTENT ASTHMA WITHOUT COMPLICATION: ICD-10-CM

## 2021-11-16 DIAGNOSIS — D68.2 FACTOR V DEFICIENCY (HCC): ICD-10-CM

## 2021-11-16 DIAGNOSIS — M54.16 LUMBAR RADICULOPATHY: ICD-10-CM

## 2021-11-16 DIAGNOSIS — G90.A POTS (POSTURAL ORTHOSTATIC TACHYCARDIA SYNDROME): ICD-10-CM

## 2021-11-16 DIAGNOSIS — E66.01 MORBID OBESITY (HCC): ICD-10-CM

## 2021-11-16 DIAGNOSIS — E78.49 OTHER HYPERLIPIDEMIA: ICD-10-CM

## 2021-11-16 DIAGNOSIS — E06.3 HASHIMOTO'S THYROIDITIS: ICD-10-CM

## 2021-11-16 DIAGNOSIS — O24.414 INSULIN CONTROLLED GESTATIONAL DIABETES MELLITUS (GDM) IN SECOND TRIMESTER: Primary | ICD-10-CM

## 2021-11-16 PROCEDURE — G8484 FLU IMMUNIZE NO ADMIN: HCPCS | Performed by: FAMILY MEDICINE

## 2021-11-16 PROCEDURE — G8417 CALC BMI ABV UP PARAM F/U: HCPCS | Performed by: FAMILY MEDICINE

## 2021-11-16 PROCEDURE — 4004F PT TOBACCO SCREEN RCVD TLK: CPT | Performed by: FAMILY MEDICINE

## 2021-11-16 PROCEDURE — G8427 DOCREV CUR MEDS BY ELIG CLIN: HCPCS | Performed by: FAMILY MEDICINE

## 2021-11-16 PROCEDURE — 59025 FETAL NON-STRESS TEST: CPT

## 2021-11-16 PROCEDURE — 99214 OFFICE O/P EST MOD 30 MIN: CPT | Performed by: FAMILY MEDICINE

## 2021-11-16 SDOH — ECONOMIC STABILITY: FOOD INSECURITY: WITHIN THE PAST 12 MONTHS, YOU WORRIED THAT YOUR FOOD WOULD RUN OUT BEFORE YOU GOT MONEY TO BUY MORE.: NEVER TRUE

## 2021-11-16 SDOH — ECONOMIC STABILITY: FOOD INSECURITY: WITHIN THE PAST 12 MONTHS, THE FOOD YOU BOUGHT JUST DIDN'T LAST AND YOU DIDN'T HAVE MONEY TO GET MORE.: NEVER TRUE

## 2021-11-16 ASSESSMENT — ENCOUNTER SYMPTOMS
EYE ITCHING: 0
STRIDOR: 0
SINUS PRESSURE: 0
APNEA: 0
ORTHOPNEA: 0
VOICE CHANGE: 0
ALLERGIC/IMMUNOLOGIC NEGATIVE: 1
FACIAL SWELLING: 0
RECTAL PAIN: 0
BLOOD IN STOOL: 0
EYE PAIN: 0
ANAL BLEEDING: 0
BLURRED VISION: 0
SHORTNESS OF BREATH: 0
CHOKING: 0
RHINORRHEA: 0
COLOR CHANGE: 0
PHOTOPHOBIA: 0
EYE REDNESS: 0
WHEEZING: 0
CONSTIPATION: 0
BACK PAIN: 1
EYE DISCHARGE: 0
ABDOMINAL DISTENTION: 0
DIARRHEA: 0
GASTROINTESTINAL NEGATIVE: 1
CHEST TIGHTNESS: 0
RESPIRATORY NEGATIVE: 1
TROUBLE SWALLOWING: 0
SINUS PAIN: 0

## 2021-11-16 ASSESSMENT — PATIENT HEALTH QUESTIONNAIRE - PHQ9
1. LITTLE INTEREST OR PLEASURE IN DOING THINGS: 0
SUM OF ALL RESPONSES TO PHQ QUESTIONS 1-9: 0
2. FEELING DOWN, DEPRESSED OR HOPELESS: 0
SUM OF ALL RESPONSES TO PHQ9 QUESTIONS 1 & 2: 0

## 2021-11-16 ASSESSMENT — SOCIAL DETERMINANTS OF HEALTH (SDOH): HOW HARD IS IT FOR YOU TO PAY FOR THE VERY BASICS LIKE FOOD, HOUSING, MEDICAL CARE, AND HEATING?: NOT HARD AT ALL

## 2021-11-16 NOTE — PROGRESS NOTES
Carol Davenport is a 27 y.o. female. HPI/Chief C/O:  Chief Complaint   Patient presents with   [address]     Pt calling to discuss labs from OBGYN    Other     Pt consents to the telemedicine visit and is currently in [address]   Allergen Reactions    Latex Dermatitis    Prednisone Other (See Comments)     Becomes aggressive per mother    Prozac [Fluoxetine Hcl] Other (See Comments)     Suicidal thoughts   TeleMedicine Video Visit    Jefferson Muñiz, was evaluated through a synchronous (real-time) audio-video encounter. The patient (or guardian if applicable) is aware that this is a billable service. Verbal consent to proceed has been obtained within the past 12 months. The visit was conducted pursuant to the emergency declaration under the [address] [place] [address], [address] [place] [address] authority and the [employer] Act. Patient identification was verified, and a caregiver was present when appropriate. The patient was located in a state where the provider was credentialed to provide care. Patient identification was verified at the start of the visit, including the patient's telephone number and physical location. I discussed with the patient the nature of our telehealth visits, that:     Due to the nature of an audio- video modality, the only components of a physical exam that could be done are the elements supported by direct observation. I would evaluate the patient and recommend diagnostics and treatments based on my assessment. If it was felt that the patient should be evaluated in clinic or an emergency room setting, then they would be directed there. Our sessions are not being recorded and that personal health information is protected. Our team would provide follow up care in person if/when the patient needs it.            Patient's location: home address in [facility]  Physician  location home address in [facility] other people involved in call  , are none      On this date 11/16/2021 I have spent 30  minutes reviewing previous notes, test results and face to face (virtual) with the patient discussing the diagnosis and importance of compliance with the treatment plan as well as documenting on the day of the visit. ,     This visit was completed virtually using D[employer]. me  The patient is here for a medication list and treatment planning review  We will go over our care planning goals as well as take care of all refills  We will set up labs as well   She is pregnant             Hypertension  This is a chronic problem. The current episode started more than 1 year ago. The problem is controlled. Associated symptoms include anxiety and malaise/fatigue. Pertinent negatives include no blurred vision, chest pain, headaches, neck pain, orthopnea, palpitations, peripheral edema, PND, shortness of breath or sweats. Risk factors for coronary artery disease include family history. Past treatments include lifestyle changes and beta blockers. The current treatment provides significant improvement. Compliance problems include exercise, diet and psychosocial issues. Hypertensive end-organ damage includes CVA. There is no history of angina, kidney disease, CAD/MI (leiden factor V), heart failure, left ventricular hypertrophy, PVD (H/O DVT lower extremity ) or retinopathy. Identifiable causes of hypertension include a thyroid problem. There is no history of chronic renal disease, coarctation of the aorta, hyperaldosteronism, hypercortisolism, hyperparathyroidism, a hypertension causing med, pheochromocytoma, renovascular disease or sleep apnea. Back Pain  This is a chronic problem. The current episode started more than 1 month ago. The problem occurs constantly. The problem has been gradually worsening since onset. The pain is present in the lumbar spine. The quality of the pain is described as shooting and stabbing.  The pain is at a severity of 8/10. The pain is moderate. The pain is the same all the time. Stiffness is present all day. Associated symptoms include leg pain and tingling. Pertinent negatives include no chest pain, dysuria, headaches, paresis, paresthesias, pelvic pain, perianal numbness or weight loss. ROS:  Review of Systems   Constitutional: Positive for malaise/fatigue. Negative for activity change, appetite change, unexpected weight change and weight loss. HENT: Negative for dental problem, drooling, ear discharge, ear pain, facial swelling, hearing loss, mouth sores, nosebleeds, postnasal drip, rhinorrhea, sinus pressure, sinus pain, sneezing, tinnitus, trouble swallowing and voice change. Eyes: Negative for blurred vision, photophobia, pain, discharge, redness, itching and visual disturbance. Respiratory: Negative. Negative for apnea, choking, chest tightness, shortness of breath, wheezing and stridor. Cardiovascular: Negative. Negative for chest pain, palpitations, orthopnea, leg swelling and PND. Gastrointestinal: Negative. Negative for abdominal distention, anal bleeding, blood in stool, constipation, diarrhea and rectal pain. Endocrine: Negative. Negative for cold intolerance, heat intolerance, polydipsia, polyphagia and polyuria. Genitourinary: Negative. Negative for decreased urine volume, difficulty urinating, dyspareunia, dysuria, enuresis, flank pain, frequency, genital sores, hematuria, menstrual problem, pelvic pain, urgency, vaginal bleeding, vaginal discharge and vaginal pain. Musculoskeletal: Positive for back pain and gait problem. Negative for neck pain and neck stiffness. Skin: Negative for color change, pallor and wound. Allergic/Immunologic: Negative. Negative for environmental allergies, food allergies and immunocompromised state. Neurological: Positive for tingling.  Negative for dizziness, tremors, seizures, syncope, facial asymmetry, speech difficulty, light-headedness, headaches and paresthesias. Hematological: Negative for adenopathy. Bruises/bleeds easily. Psychiatric/Behavioral: Positive for decreased concentration and dysphoric mood. Negative for agitation, behavioral problems, confusion, hallucinations, self-injury, sleep disturbance and suicidal ideas. The patient is nervous/anxious. The patient is not hyperactive. Past Medical/Surgical Hx;  Reviewed with patient      Diagnosis Date    Anxiety     Asthma     Blood clot embolism during pregnancy, antepartum     Cerebrovascular accident (CVA) due to vascular occlusion (HCC)     Chronic migraine     Coagulopathy (HCC)     DVT of leg (deep venous thrombosis) (HCC)     left    DVT of upper extremity (deep vein thrombosis) (HCC)     Left X 2    Heterozygous for BERT-1 4G allele     Low back pain     MTHFR mutation     POTS (postural orthostatic tachycardia syndrome)     Shingles      Past Surgical History:   Procedure Laterality Date    DILATION AND CURETTAGE OF UTERUS      NERVE BLOCK Left 08/02/2018    transforaminal epidural #1    VT ZEHRA NOSE/CLEFT LIP/TIP Left 08/02/2018    LEFT L5-S1 TRANSFORAMINAL EPIDURAL STEROID INJECTION #1 [facility] [address]        Past Family Hx:  Reviewed with patient      Problem Relation Age of Onset    Diabetes Father     Deep Vein Thrombosis Maternal Grandmother     Substance Abuse Brother     Stroke Paternal Uncle        Social Hx:  Reviewed with patient  Social History     Tobacco Use    Smoking status: Current Some Day Smoker     Types: Cigarettes    Smokeless tobacco: Never Used   Substance Use Topics    Alcohol use: Not Currently       OBJECTIVE  [facility] 04/01/2021     Problem List:  Jordyn Lincoln does not have any pertinent problems on file. PHYS EX:  General: Awake/Alert/Oriented/No Acute Distress      ASSESSMENT/PLAN  Jordyn Lincoln was seen today for discuss labs and other.     Diagnoses and all orders for this visit:    Insulin controlled gestational diabetes mellitus (GDM) in second trimester  Long talk on treatment and prevention  Literature is given   - A1C is 5.9    Morbid obesity ([facility]  --diet during pregnancy and watch weight     Lumbar radiculopathy  Long talk on treatment and prevention  Literature is given       Hashimoto's thyroiditis  --stable on current care planning  -- continue treatment as we are meeting goals       Mild intermittent asthma without complication  --stable on current care planning  -- continue treatment as we are meeting goals       Factor V deficiency ([facility]    ---VASCULAR PANEL  A) ASA, plavix, aggrenox  B) LOVENOX, pletal, tzd, statin  C) ace, hctz, FOLIC, ccb  D) cannikinumab, fish oils     ---CARDIAC---LOVENOX, ace, BETA, statin, hctz, ( ccb )    Other hyperlipidemia  --Mediterranean diet, exercise, weight loss, vitamins    We have a long talk on cholesterol and importance of lowering it       POTS (postural orthostatic tachycardia syndrome)  Long talk on treatment and prevention  Literature is given           Outpatient Encounter Medications as of 11/16/2021   Medication Sig Dispense Refill    Insulin NPH Human, Isophane, (NOVOLIN N SC) Inject into the skin      Insulin Regular Human (NOVOLIN R IJ) Inject as directed      metFORMIN (GLUCOPHAGE) 1000 MG tablet Take 2,000 mg by mouth nightly       metoprolol succinate (TOPROL XL) 25 MG extended release tablet Take 12.5 mg by mouth nightly      enoxaparin (LOVENOX) 100 MG/ML injection Inject 100 mg into the skin daily      Prenatal Vit-DSS-Fe Cbn-FA (PRENATAL AD PO) Take by mouth      diphenhydrAMINE (BENADRYL) 25 MG tablet Take 25 mg by mouth nightly as needed for Sleep      aspirin 81 MG EC tablet Take 81 mg by mouth 2 times daily      Cholecalciferol (VITAMIN D3) 50 MCG (2000 UT) TABS Take one tablet daily 90 tablet 1    albuterol sulfate HFA (VENTOLIN HFA) 108 (90 Base) MCG/ACT inhaler Inhale 2 puffs into the

## 2021-11-16 NOTE — PROGRESS NOTES
32 weeks 3 days ambulatory to unit for nst. Maternal perception of fetal movement present, efm applied.

## 2021-12-06 ENCOUNTER — APPOINTMENT (OUTPATIENT)
Dept: LABOR AND DELIVERY | Age: 31
End: 2021-12-06
Payer: MEDICARE

## 2021-12-06 ENCOUNTER — HOSPITAL ENCOUNTER (OUTPATIENT)
Age: 31
Discharge: HOME | End: 2021-12-06
Attending: OBSTETRICS & GYNECOLOGY | Admitting: OBSTETRICS & GYNECOLOGY
Payer: MEDICARE

## 2021-12-06 VITALS
RESPIRATION RATE: 18 BRPM | DIASTOLIC BLOOD PRESSURE: 59 MMHG | TEMPERATURE: 98.2 F | SYSTOLIC BLOOD PRESSURE: 116 MMHG | HEART RATE: 93 BPM

## 2021-12-06 PROCEDURE — 59025 FETAL NON-STRESS TEST: CPT

## 2021-12-06 NOTE — PROGRESS NOTES
35w2d RUBY 21  ambulatory to L&D floor for scheduled NST due to GDM. EFM applied. Denies any bleeding or leaking of fluids. Maternal perception of fetal movements noted. Call light in reach.

## 2021-12-07 NOTE — LETTER
[facility] Diabetes Education Follow-up letter    Name: Jennifer Doe  :   1990    Date:   2021                   Dear Dr. Doretha Schmitz    Thank you for referring Jennifer Doe to [facility] Diabetes Education Services. Jennifer Doe had completed their comprehensive education plan which included the following topics:Gestational  Disease Process, Nutrition, Exercise, Glucose Monitoring, Acute and Chronic Complication, Behavioral and Lifestyle Change/Coping and goal setting. We follow-up with our participants after  to monitor their progress on their selected goal.      This letter is to notify you that we were unable to reach your patient for follow-up and to evaluate the outcome of their chosen goal Meal Plan.       Please contact us if you need any further assistance with this patient at:     [facility] or [id number] [place] [address] Locations: [place] Location: [facility]) Diabetes Education Department  American Diabetes Association Recognized DSMES Program

## 2021-12-07 NOTE — PROGRESS NOTES
Diabetes Self-Management Education Record    Participant Name: Joao Cordero  Referring Provider: Jeanette Gaming DO  Assessment/Evaluation Ratings:  1=Needs Instruction   4=Demonstrates Understanding/Competency  2=Needs Review   NC=Not Covered    3=Comprehends Key Points  N/A=Not Applicable  Topics/Learning Objectives Pre-session Assess Date:  Instructor initials/date  10/13/21KC Instr. Date    Instructor initials/date  10/13/21KC Follow-up Post- session Eval Comments   Diabetes disease process & Treatment process:   -Define type of diabetes in simple terms.  - Describe the ABCs of  diabetes management  -Identify own type of diabetes  -Identify lifestyle changes/treatment options  -other:  2 [x] All     []  []  []  []  []  3 10/13/21KC   Developing strategies for Healthy coping/psychosocial issues:    -Describe feelings about living with diabetes  -Identify coping strategies and sources of stress  -Identify support needed & support network available  -Complete PAID-5 Diabetes questionnaire 1 [] All     []  []  []    [x]  3 10/13/21KC: PAID 5=0   Prevention, detection & treatment of Chronic complications:    -Identify the prevention, detection and treatment for complications including immunizations, preventive eye, foot, dental and renal exams as indicated per the participant's duration of diabetes and health status.  -Define the natural course of diabetes and the relationship of blood glucose levels to long term complications of diabetes.   2 [] All     []            [x]  3 10/13/21KC: r/t GDM   Prevention, detection & treatment of acute complications:    -State the causes,signs & symptoms of hyper & hypoglycemia, and prevention & treatment strategies.   -Describe sick day guidelines  DKA /indications for ketone testing &  when to call physician  3 [] All     []      []        -Identify severe weather/situation crisis  & diabetes supplies management  []      Using medications safely:   -State effects of diabetes medicines on blood glucose levels;  -List diabetes medication taken, action & side effects n/a [] All     []  []      Insulin/Injectables/glucagon  -Name appropriate injection sites; proper storage; supplies needed;  n/a   []       Demonstrate proper technique  []      Monitoring blood glucose, interpreting and using results:   -Identify the purpose of testing   -Identify recommended & personal blood glucose targets & HgbA1C target levels  -State the Importance of logging blood glucose levels for pattern recognition;   -State benefits of reading/using pt generated health data  -Verbalize safe lancet disposal 3 [] All     []  []    []  []  []      -Demonstrate proper testing technique  []      Incorporating physical activity into lifestyle:   -State effect of exercise on blood glucose levels;   -State benefits of regular exercise;   -Define safety considerations/food choices if needed.  -Describe contraindications/maintenance of activity. n/a [] All     []  []    []  []   10/13/21KC: Pt on bed rest   Incorporating nutritional management into lifestyle:   -Describe effect of type, amount & timing of food on blood glucose  -Describe methods for preparing and planning healthy meals  -Correctly read food labels  -Name 3 foods high in Carbohydrate 1 [x] All       []    []    []  []  4 10/13/21KC: Instructed pt on 2000 calorie diet with 14 total carbohydrate servings per day. 2 carbohydrate choices for breakfast, 3 carbohydrate choices for lunch, 3 carbohydrate choices for dinner, 2 carbohydrate choices for AM/PM/HS snack. Discussed recommended weight goals and water intakes. Discussed different types of carbohydrates and choosing higher quality carbohydrates with more fiber. Educated pt on lean proteins and low fat. Educated pt on label reading and how to apply grams of total carbohydrates into carbohydrate servings to comply with meal plan. Encouraged pt to use measuring cups for portion control.  Instructed pt to call with any questions. 10/27/2021 Pt contacted diabetes education, states AM readings above target at 95 mg/dl, Pt states she has more stress at home, encouraged patient to try to limit stress, and will try to reduce carbohydrate at snack time to 1 per snack and at bedtime. Pt feels she is too full. Will be liberal with protein as needed. Encouraged patient to try for 3 days. Pt is on bed rest, activity very limited. Pt will let us know how she is doing. Dru Alanis a carbohydrate-controlled meal based on individualized meal plan  -Demonstrate CHO counting/portion control   []  []      Developing strategies for problem solving to promote health/change behavior. -Identify 7 self-care behaviors; Personal health risk factors; Benefits, challenges & strategies for behavioral change and set an individualized goal selection. 1       [x]  3 10/13/21kc  [x]Nutrition  [x]Monitoring  []Exercise  []Medication  []Other     Identified Barriers to learning/adherence to self management plan:    None  []  other    Instruction Method:  Lecture/Discussion and Handouts    Supporting Education Materials/Equipment Provided: Meal Plan, Nutritional Packet and Gestational Pathway Booklet   []Hungarian materials       [] services     []Other:      Encounter Type Date Attended Start Time End Time Comments No Show Dates   Assessment          Session 1         Session 2        1:1 DSMES          In person Follow-up         Gestational Diabetes 10/13/21 1300 1400 In person    Individual MNT        Meter Instrx        Insulin Instrx           Additional Comments: [] Pt seen individually due to Covid-19 Safety precautions and no group session available.         Date: 12/7/21   Follow-up goal attainment based on patients initial DSMES goal    Dr Notified by [] EMR []Fax        []Post class Hgb A1C  []Medication compliance   []Plate method/meal plan compliance   []Able to state the number of Carbohydrate servings eaten at B,L,D   []Testing blood glucose as prescribed by PCP   []Exercise Routine   []Other:   []Other:     [x]Patient lost to follow-up  Dr Notified by [x]EMR []Fax     Personal Support Plan:      [] Keep all scheduled doctor appointments   [] Make and keep appointments with specialists (foot, eye, dentist) as recommended   [] Consult my pharmacist about all new medications or to ask any medication questions   [] Get tested for sleep apnea   [] Seek help for:   [] Make an appointment with:   [] Attend smoking cessation classes or call 1-800-QUIT-NOW  [] Attend Diabetes Support Group   [] Use diabetes magazines, books, or credible web-sites like the ADA for more information  [] Increase exercise at home or join an exercise program:   [] Other:

## 2021-12-10 ENCOUNTER — OFFICE VISIT (OUTPATIENT)
Dept: FAMILY MEDICINE CLINIC | Age: 31
End: 2021-12-10
Payer: MEDICARE

## 2021-12-10 VITALS
SYSTOLIC BLOOD PRESSURE: 130 MMHG | HEIGHT: 66 IN | DIASTOLIC BLOOD PRESSURE: 74 MMHG | BODY MASS INDEX: 46.12 KG/M2 | OXYGEN SATURATION: 97 % | RESPIRATION RATE: 16 BRPM | WEIGHT: 287 LBS | TEMPERATURE: 97.6 F | HEART RATE: 100 BPM

## 2021-12-10 DIAGNOSIS — E06.3 HASHIMOTO'S THYROIDITIS: ICD-10-CM

## 2021-12-10 DIAGNOSIS — Z33.3 PREGNANT STATE, GESTATIONAL CARRIER: ICD-10-CM

## 2021-12-10 DIAGNOSIS — E78.49 OTHER HYPERLIPIDEMIA: ICD-10-CM

## 2021-12-10 DIAGNOSIS — F41.9 ANXIETY: ICD-10-CM

## 2021-12-10 DIAGNOSIS — E66.01 MORBID OBESITY (HCC): ICD-10-CM

## 2021-12-10 DIAGNOSIS — D68.2 FACTOR V DEFICIENCY (HCC): ICD-10-CM

## 2021-12-10 DIAGNOSIS — O24.414 INSULIN CONTROLLED GESTATIONAL DIABETES MELLITUS (GDM) IN SECOND TRIMESTER: ICD-10-CM

## 2021-12-10 DIAGNOSIS — J45.20 MILD INTERMITTENT ASTHMA WITHOUT COMPLICATION: ICD-10-CM

## 2021-12-10 DIAGNOSIS — M65.4 DE QUERVAIN'S TENOSYNOVITIS, RIGHT: Primary | ICD-10-CM

## 2021-12-10 DIAGNOSIS — G90.A POTS (POSTURAL ORTHOSTATIC TACHYCARDIA SYNDROME): ICD-10-CM

## 2021-12-10 PROCEDURE — G8427 DOCREV CUR MEDS BY ELIG CLIN: HCPCS | Performed by: FAMILY MEDICINE

## 2021-12-10 PROCEDURE — 99214 OFFICE O/P EST MOD 30 MIN: CPT | Performed by: FAMILY MEDICINE

## 2021-12-10 PROCEDURE — G8417 CALC BMI ABV UP PARAM F/U: HCPCS | Performed by: FAMILY MEDICINE

## 2021-12-10 PROCEDURE — 1036F TOBACCO NON-USER: CPT | Performed by: FAMILY MEDICINE

## 2021-12-10 PROCEDURE — G8484 FLU IMMUNIZE NO ADMIN: HCPCS | Performed by: FAMILY MEDICINE

## 2021-12-10 RX ORDER — METOPROLOL SUCCINATE 25 MG/1
12.5 TABLET, EXTENDED RELEASE ORAL NIGHTLY
Qty: 90 TABLET | Refills: 1 | Status: SHIPPED
Start: 2021-12-10 | End: 2022-02-16 | Stop reason: SDUPTHER

## 2021-12-10 ASSESSMENT — ENCOUNTER SYMPTOMS
EYE DISCHARGE: 0
PHOTOPHOBIA: 0
DIARRHEA: 0
CHEST TIGHTNESS: 0
FACIAL SWELLING: 0
SORE THROAT: 0
VOMITING: 0
COUGH: 0
EYE PAIN: 0
ABDOMINAL PAIN: 0
ALLERGIC/IMMUNOLOGIC NEGATIVE: 1
RESPIRATORY NEGATIVE: 1
ORTHOPNEA: 0
RHINORRHEA: 0
BLOOD IN STOOL: 0
VISUAL CHANGE: 0
WHEEZING: 0
SHORTNESS OF BREATH: 0
CONSTIPATION: 0
CHOKING: 0
SINUS PAIN: 0
ABDOMINAL DISTENTION: 0
NAUSEA: 0
STRIDOR: 0
EYE ITCHING: 0
GASTROINTESTINAL NEGATIVE: 1
SINUS PRESSURE: 0
APNEA: 0
BACK PAIN: 1
RECTAL PAIN: 0
TROUBLE SWALLOWING: 0
ANAL BLEEDING: 0
VOICE CHANGE: 0
EYE REDNESS: 0
COLOR CHANGE: 0
BLURRED VISION: 0

## 2021-12-10 NOTE — PROGRESS NOTES
Burt Curtis is a 27 y.o. female. HPI/Chief C/O:  Chief Complaint   Patient presents with    Diabetes     follow up    Medication Refill     pharmacy verified    Hand Injury     Pt states she has baby wrist and mothers thumb in right hand     Allergies   Allergen Reactions    Latex Dermatitis    Prednisone Other (See Comments)     Becomes aggressive per mother    Prozac [Fluoxetine Hcl] Other (See Comments)     Suicidal thoughts   The patient is here for a medication list and treatment planning review  We will go over our care planning goals as well as take care of all refills  We will set up labs as well   She has C/O severe pain to her right wrist     Diabetes  She presents for her follow-up diabetic visit. She has gestational diabetes mellitus. Hypoglycemia symptoms include nervousness/anxiousness. Pertinent negatives for hypoglycemia include no confusion, dizziness, headaches, pallor, seizures, speech difficulty, sweats or tremors. Associated symptoms include fatigue and weakness. Pertinent negatives for diabetes include no blurred vision, no chest pain, no foot paresthesias, no foot ulcerations, no polydipsia, no polyphagia, no polyuria, no visual change and no weight loss. There are no hypoglycemic complications. Diabetic complications include a CVA. Pertinent negatives for diabetic complications include no autonomic neuropathy, heart disease, nephropathy, peripheral neuropathy, PVD (H/O DVT lower extremity ) or retinopathy. Risk factors for coronary artery disease include obesity and diabetes mellitus. Current diabetic treatment includes diet and oral agent (monotherapy). She is compliant with treatment some of the time. An ACE inhibitor/angiotensin II receptor blocker is contraindicated. Hypertension  This is a chronic problem. The current episode started more than 1 year ago. The problem is controlled. Associated symptoms include anxiety and malaise/fatigue.  Pertinent negatives include no blurred vision, chest pain, headaches, neck pain, orthopnea, palpitations, peripheral edema, PND, shortness of breath or sweats. Risk factors for coronary artery disease include family history. Past treatments include lifestyle changes and beta blockers. The current treatment provides significant improvement. Compliance problems include exercise, diet and psychosocial issues. Hypertensive end-organ damage includes CVA. There is no history of angina, kidney disease, CAD/MI (leiden factor V), heart failure, left ventricular hypertrophy, PVD (H/O DVT lower extremity ) or retinopathy. Identifiable causes of hypertension include a thyroid problem. There is no history of chronic renal disease, coarctation of the aorta, hyperaldosteronism, hypercortisolism, hyperparathyroidism, a hypertension causing med, pheochromocytoma, renovascular disease or sleep apnea. Back Pain  This is a chronic problem. The current episode started more than 1 month ago. The problem occurs constantly. The problem has been gradually worsening since onset. The pain is present in the lumbar spine. The quality of the pain is described as shooting and stabbing. The pain is at a severity of 8/10. The pain is moderate. The pain is the same all the time. Stiffness is present all day. Associated symptoms include leg pain and weakness. Pertinent negatives include no abdominal pain, chest pain, dysuria, fever, headaches, paresis, paresthesias, pelvic pain, perianal numbness or weight loss. ROS:  Review of Systems   Constitutional: Positive for fatigue and malaise/fatigue. Negative for activity change, appetite change, chills, diaphoresis, fever, unexpected weight change and weight loss. HENT: Negative.   Negative for congestion, dental problem, drooling, ear discharge, ear pain, facial swelling, hearing loss, mouth sores, nosebleeds, postnasal drip, rhinorrhea, sinus pressure, sinus pain, sneezing, sore throat, tinnitus, trouble swallowing and voice change. Eyes: Negative for blurred vision, photophobia, pain, discharge, redness, itching and visual disturbance. Respiratory: Negative. Negative for apnea, cough, choking, chest tightness, shortness of breath, wheezing and stridor. Cardiovascular: Negative. Negative for chest pain, palpitations, orthopnea, leg swelling and PND. Gastrointestinal: Negative. Negative for abdominal distention, abdominal pain, anal bleeding, blood in stool, constipation, diarrhea, nausea, rectal pain and vomiting. Endocrine: Negative. Negative for cold intolerance, heat intolerance, polydipsia, polyphagia and polyuria. Genitourinary: Negative. Negative for decreased urine volume, difficulty urinating, dyspareunia, dysuria, enuresis, flank pain, frequency, genital sores, hematuria, menstrual problem, pelvic pain, urgency, vaginal bleeding, vaginal discharge and vaginal pain. Musculoskeletal: Positive for arthralgias (severe pain to her right wrist), back pain and gait problem. Negative for joint swelling, myalgias, neck pain and neck stiffness. Skin: Negative. Negative for color change, pallor, rash and wound. Allergic/Immunologic: Negative. Negative for environmental allergies, food allergies and immunocompromised state. Neurological: Positive for weakness. Negative for dizziness, tremors, seizures, syncope, facial asymmetry, speech difficulty, light-headedness, headaches and paresthesias. Hematological: Negative for adenopathy. Bruises/bleeds easily. Psychiatric/Behavioral: Positive for decreased concentration and dysphoric mood. Negative for agitation, behavioral problems, confusion, hallucinations, self-injury, sleep disturbance and suicidal ideas. The patient is nervous/anxious. The patient is not hyperactive.          Past Medical/Surgical Hx;  Reviewed with patient      Diagnosis Date    Anxiety     Asthma     Blood clot embolism during pregnancy, antepartum     Cerebrovascular accident (CVA) due to vascular occlusion (HCC)     Chronic migraine     Coagulopathy (HCC)     DVT of leg (deep venous thrombosis) (HCC)     left    DVT of upper extremity (deep vein thrombosis) (HCC)     Left X 2    Heterozygous for BERT-1 4G allele     Low back pain     MTHFR mutation     POTS (postural orthostatic tachycardia syndrome)     Shingles      Past Surgical History:   Procedure Laterality Date    DILATION AND CURETTAGE OF UTERUS      NERVE BLOCK Left 2018    transforaminal epidural #1    IA ZEHRA NOSE/CLEFT LIP/TIP Left 2018    LEFT L5-S1 TRANSFORAMINAL EPIDURAL STEROID INJECTION #1 performed by Trent Espino DO at [facility] [address] Right        Past Family Hx:  Reviewed with patient      Problem Relation Age of Onset    Diabetes Father     Deep Vein Thrombosis Maternal Grandmother     Substance Abuse Brother     Stroke Paternal Uncle        Social Hx:  Reviewed with patient  Social History     Tobacco Use    Smoking status: Former Smoker     Packs/day: 0.50     Types: Cigarettes     Start date:      Quit date: 2021     Years since quittin.4    Smokeless tobacco: Never Used   Substance Use Topics    Alcohol use: Not Currently       OBJECTIVE  /74   Pulse 100   Temp 97.6 °F (36.4 °C)   Resp 16   Ht 5' 6\" (1.676 m)   Wt 287 lb (130.2 kg)   LMP 2021   SpO2 97%   BMI 46.32 kg/m²     Problem List:  Noah Luke does not have any pertinent problems on file. PHYS EX:  Physical Exam  Vitals and nursing note reviewed. Constitutional:       General: She is not in acute distress. Appearance: Normal appearance. She is well-developed. She is obese. She is not ill-appearing, toxic-appearing or diaphoretic. HENT:      Head: Normocephalic and atraumatic. Right Ear: External ear normal. There is no impacted cerumen. Left Ear: External ear normal. There is no impacted cerumen.       Nose: Nose normal. No congestion or rhinorrhea. Mouth/Throat:      Mouth: Mucous membranes are moist.      Pharynx: No oropharyngeal exudate or posterior oropharyngeal erythema. Eyes:      General: No scleral icterus. Right eye: No discharge. Left eye: No discharge. Extraocular Movements: Extraocular movements intact. Conjunctiva/sclera: Conjunctivae normal.      Pupils: Pupils are equal, round, and reactive to light. Neck:      Thyroid: No thyromegaly. Vascular: No carotid bruit or JVD. Trachea: No tracheal deviation. Cardiovascular:      Rate and Rhythm: Normal rate and regular rhythm. Pulses: Normal pulses. Heart sounds: Normal heart sounds. No murmur heard. No friction rub. No gallop. Pulmonary:      Effort: Pulmonary effort is normal. No respiratory distress. Breath sounds: Normal breath sounds. No stridor. No wheezing, rhonchi or rales. Chest:      Chest wall: No tenderness. Abdominal:      General: Bowel sounds are normal. There is no distension. Palpations: Abdomen is soft. There is no mass. Tenderness: There is no abdominal tenderness. There is no right CVA tenderness, left CVA tenderness, guarding or rebound. Hernia: No hernia is present. Comments: Pregnant state    Musculoskeletal:         General: Tenderness (severe pain to the right thumb extensor tendon) present. No swelling, deformity or signs of injury. Normal range of motion. Cervical back: Normal range of motion and neck supple. No rigidity. No muscular tenderness. Right lower leg: Edema present. Left lower leg: Edema present. Lymphadenopathy:      Cervical: No cervical adenopathy. Skin:     General: Skin is warm. Coloration: Skin is not jaundiced or pale. Findings: No bruising, erythema, lesion or rash. Neurological:      General: No focal deficit present. Mental Status: She is alert and oriented to person, place, and time. Cranial Nerves: No cranial nerve deficit. Sensory: Sensory deficit present. Motor: Weakness present. No abnormal muscle tone. Coordination: Coordination abnormal.      Gait: Gait abnormal.      Deep Tendon Reflexes: Reflexes abnormal.         ASSESSMENT/PLAN  Linda Buck was seen today for diabetes, medication refill and hand injury. Diagnoses and all orders for this visit:    De Quervain's tenosynovitis, right  -     External Referral To Orthopedic Surgery  --PLAN--inject right thumb extensor tendon x 1                1/2 cc xylocaine plus 1/2  cc depo medrol                Omt/ultra--Rx        POTS (postural orthostatic tachycardia syndrome)  -     metoprolol succinate (TOPROL XL) 25 MG extended release tablet;  Take 0.5 tablets by mouth nightly  -     DME Order for [facility]) as OP  Long talk on treatment and prevention  Literature is given   She is stable on BB      Mild intermittent asthma without complication  -     DME Order for (Specify) as OP  --PLAN--aerosol accuneb 1.25 plus chest percussion--Rx      Morbid obesity ([address]  -     [facility]icap [facility]; by Does not apply route Expires in 1 year  -     DME Order for (Specify) as OP  -     External Referral To Orthopedic Surgery  --talk diet and weight loss     Other hyperlipidemia  --Mediterranean diet, exercise, weight loss, vitamins    We have a long talk on cholesterol and importance of lowering it       Factor V deficiency (HCC)  --stable on current care planning  -- continue treatment as we are meeting goals   --she is stable on Lovenox       Anxiety  Long talk on treatment and prevention  Literature is given   Counseling is given for anxiety and depression   All questions were taken and answers are given        Hashimoto's thyroiditis  --stable on current care planning  -- continue treatment as we are meeting goals       Pregnant state, gestational carrier  -     Handicap Placard [facility]; by Does not apply route Expires in 1 year    Other orders  -     diclofenac sodium (VOLTAREN) 1 % GEL;  Apply 2 g topically 4 times daily    ---VASCULAR PANEL  A) ASA, plavix, aggrenox  B) LOVENOX, pletal, tzd, statin  C) ace, hctz, folic, ccb  D) cannikinumab, fish oils     ---CARDIAC---LOVENOX, ace, BETA, statin, hctz, ( ccb )    Outpatient Encounter Medications as of 12/10/2021   Medication Sig Dispense Refill    metoprolol succinate (TOPROL XL) 25 MG extended release tablet Take 0.5 tablets by mouth nightly 90 tablet 1    Handicap Placard MISC by Does not apply route Expires in 1 year 1 each 0    diclofenac sodium (VOLTAREN) 1 % GEL Apply 2 g topically 4 times daily 100 g 3    Insulin NPH Human, Isophane, (NOVOLIN N SC) Inject into the skin      Insulin Regular Human (NOVOLIN R IJ) Inject as directed      metFORMIN (GLUCOPHAGE) 1000 MG tablet Take 2,000 mg by mouth nightly       enoxaparin (LOVENOX) 100 MG/ML injection Inject 100 mg into the skin daily      Prenatal Vit-DSS-Fe Cbn-FA (PRENATAL AD PO) Take by mouth      diphenhydrAMINE (BENADRYL) 25 MG tablet Take 25 mg by mouth nightly as needed for Sleep      aspirin 81 MG EC tablet Take 81 mg by mouth 2 times daily      Cholecalciferol (VITAMIN D3) 50 MCG (2000 UT) TABS Take one tablet daily 90 tablet 1    albuterol sulfate HFA (VENTOLIN HFA) 108 (90 Base) MCG/ACT inhaler Inhale 2 puffs into the lungs every 6 hours as needed for Wheezing 3 Inhaler 1    folic acid (FOLVITE) 1 MG tablet TAKE 1 TABLET BY MOUTH ONCE DAILY (Patient taking differently: Take 3 mg by mouth daily ) 90 tablet 0    pyridostigmine (MESTINON) 60 MG tablet Take 1 tablet by mouth 3 times daily 270 tablet 1    citalopram (CELEXA) 20 MG tablet Take 20 mg by mouth daily      acetaminophen (TYLENOL) 500 MG tablet Take 500 mg by mouth every 6 hours as needed       [DISCONTINUED] metoprolol succinate (TOPROL XL) 25 MG extended release tablet Take 12.5 mg by mouth nightly       No facility-administered encounter medications on file as of 12/10/2021. No follow-ups on file.         Reviewed recent labs related to Gertrude's current problems      Discussed importance of regular Health Maintenance follow up  Health Maintenance   Topic    Varicella vaccine (1 of 2 - 2-dose childhood series)    Pneumococcal 0-64 years Vaccine (1 of 2 - PPSV23)    COVID-19 Vaccine (1)    HIV screen     DTaP/Tdap/Td vaccine (1 - Tdap)    Cervical cancer screen     Flu vaccine (1)    A1C test (Diabetic or Prediabetic)     Hepatitis C screen     Hepatitis A vaccine     Hepatitis B vaccine     Hib vaccine     Meningococcal (ACWY) vaccine

## 2021-12-10 NOTE — PATIENT INSTRUCTIONS
Patient Education        Sun Mena Disease: Exercises  Introduction  Here are some examples of exercises for you to try. The exercises may be suggested for a condition or for rehabilitation. Start each exercise slowly. Ease off the exercises if you start to have pain. You will be told when to start these exercises and which ones will work best for you. How to do the exercises  Thumb lifts    1. Place your hand on a flat surface, with your palm up. 2. Lift your thumb away from your palm to make a \"C\" shape. 3. Hold for about 6 seconds. 4. Repeat 8 to 12 times. Passive thumb MP flexion    1. Hold your hand in front of you, and turn your hand so your little finger faces down and your thumb faces up. (Your hand should be in the position used for shaking someone's hand.) You may also rest your hand on a flat surface. 2. Use the fingers on your other hand to bend your thumb down at the point where your thumb connects to your palm. 3. Hold for at least 15 to 30 seconds. 4. Repeat 2 to 4 times. Finkelstein stretch    1. Hold your arms out in front of you. (Your hand should be in the position used for shaking someone's hand.)  2. Bend your thumb toward your palm. 3. Use your other hand to gently stretch your thumb and wrist downward until you feel the stretch on the thumb side of your wrist.  4. Hold for at least 15 to 30 seconds. 5. Repeat 2 to 4 times. Resisted ulnar deviation    For this exercise, you will need elastic exercise material, such as surgical tubing or Thera-Band. 1. Sit leaning forward with your legs slightly spread and your elbow on your thigh. 2. Grasp one end of the band with your palm down, and step on the other end with the foot opposite the hand holding the band. 3. Slowly bend your wrist sideways and away from your knee. 4. Repeat 8 to 12 times. Follow-up care is a key part of your treatment and safety.  Be sure to make and go to all appointments, and call your doctor if you are having problems. It's also a good idea to know your test results and keep a list of the medicines you take. Where can you learn more? Go to https://chpepiceweb.[employer]. org and sign in to your [employer] account. Enter R780 in the [employer] box to learn more about \"De Quervain's Disease: Exercises. \"     If you do not have an account, please click on the \"Sign Up Now\" link. Current as of: July 1, 2021               Content Version: 13.0  © 2006-2021 [employer]. Care instructions adapted under license by [facility] ([facility]). If you have questions about a medical condition or this instruction, always ask your healthcare professional. [address] any warranty or liability for your use of this information. Patient Education        Princess Thomas Tenosynovitis: Care Instructions  Your Care Instructions  Princess Thomas (say \"[id number]\") tenosynovitis is a problem that makes the bottom of your thumb and the side of your wrist hurt. When you have de Quervain's, the ropey fiber (tendon) that helps move your thumb away from your fingers becomes swollen. You may have pain when you move your wrist or pick things up. You may hear a creaking sound when you move your wrist or thumb. Symptoms often get better in a few weeks with home care. Your doctor may want you to start some gentle stretching exercises once your symptoms are gone. Sometimes treatment with an injection or surgery is needed. Follow-up care is a key part of your treatment and safety. Be sure to make and go to all appointments, and call your doctor if you are having problems. It's also a good idea to know your test results and keep a list of the medicines you take. How can you care for yourself at home? · Until your symptoms are better, stop the activities that caused the pain. · Avoid moving the hand and wrist that hurt.   · Follow your doctor's directions for wearing a splint to keep your thumb and wrist from moving. · Try ice or heat. ? Put ice or a cold pack on your thumb and wrist for 10 to 20 minutes at a time. Put a thin cloth between the ice and your skin. ? You can use heat for 20 to 30 minutes, 2 or 3 times a day. Try using a heating pad, hot shower, or hot pack. · Ask your doctor if you can take an over-the-counter pain medicine, such as acetaminophen (Tylenol), ibuprofen (Advil, Motrin), or naproxen (Aleve). Be safe with medicines. Read and follow all instructions on the label. When should you call for help? Watch closely for changes in your health, and be sure to contact your doctor if:    · You have new or worse pain.     · You have new or worse numbness or tingling in your hand or fingers.     · Your hand feels weaker.     · You do not get better as expected. Where can you learn more? Go to https://Vungle."[employer]. org and sign in to your [employer] account. Enter C917 in the [facility] box to learn more about \"De Quervain's Tenosynovitis: Care Instructions. \"     If you do not have an account, please click on the \"Sign Up Now\" link. Current as of: July 1, 2021               Content Version: 13.0  © 5938-1049 [employer]. Care instructions adapted under license by [facility] ([facility]). If you have questions about a medical condition or this instruction, always ask your healthcare professional. [place] [address] any warranty or liability for your use of this information.

## 2021-12-23 ENCOUNTER — ANESTHESIA EVENT (OUTPATIENT)
Dept: LABOR AND DELIVERY | Age: 31
End: 2021-12-23
Payer: MEDICARE

## 2021-12-23 ENCOUNTER — HOSPITAL ENCOUNTER (INPATIENT)
Age: 31
LOS: 3 days | Discharge: HOME | End: 2021-12-26
Attending: OBSTETRICS & GYNECOLOGY | Admitting: OBSTETRICS & GYNECOLOGY
Payer: MEDICARE

## 2021-12-23 ENCOUNTER — ANESTHESIA (OUTPATIENT)
Dept: LABOR AND DELIVERY | Age: 31
End: 2021-12-23
Payer: MEDICARE

## 2021-12-23 VITALS
SYSTOLIC BLOOD PRESSURE: 107 MMHG | DIASTOLIC BLOOD PRESSURE: 53 MMHG | OXYGEN SATURATION: 98 % | RESPIRATION RATE: 18 BRPM

## 2021-12-23 PROBLEM — D68.59 THROMBOPHILIA DURING PREGNANCY IN THIRD TRIMESTER (HCC): Status: ACTIVE | Noted: 2021-12-23

## 2021-12-23 PROBLEM — Z3A.37 37 WEEKS GESTATION OF PREGNANCY: Status: ACTIVE | Noted: 2021-12-23

## 2021-12-23 PROBLEM — O41.03X0 OLIGOHYDRAMNIOS IN THIRD TRIMESTER: Status: ACTIVE | Noted: 2021-12-23

## 2021-12-23 PROBLEM — O99.113 THROMBOPHILIA DURING PREGNANCY IN THIRD TRIMESTER (HCC): Status: ACTIVE | Noted: 2021-12-23

## 2021-12-23 LAB
ABO/RH: NORMAL
ALBUMIN SERPL-MCNC: 3.7 G/DL (ref 3.5–5.2)
ALP BLD-CCNC: 103 U/L (ref 35–104)
ALT SERPL-CCNC: 12 U/L (ref 0–32)
AMPHETAMINE SCREEN, URINE: NOT DETECTED
ANION GAP SERPL CALCULATED.3IONS-SCNC: 18 MMOL/L (ref 7–16)
ANTIBODY IDENTIFICATION: NORMAL
ANTIBODY SCREEN: NORMAL
AST SERPL-CCNC: 19 U/L (ref 0–31)
BARBITURATE SCREEN URINE: NOT DETECTED
BENZODIAZEPINE SCREEN, URINE: NOT DETECTED
BILIRUB SERPL-MCNC: <0.2 MG/DL (ref 0–1.2)
BUN BLDV-MCNC: 9 MG/DL (ref 6–20)
CALCIUM SERPL-MCNC: 11 MG/DL (ref 8.6–10.2)
CANNABINOID SCREEN URINE: NOT DETECTED
CHLORIDE BLD-SCNC: 100 MMOL/L (ref 98–107)
CO2: 17 MMOL/L (ref 22–29)
COCAINE METABOLITE SCREEN URINE: NOT DETECTED
CREAT SERPL-MCNC: 0.7 MG/DL (ref 0.5–1)
DAT POLYSPECIFIC: NORMAL
FENTANYL SCREEN, URINE: NOT DETECTED
GFR AFRICAN AMERICAN: >60
GFR NON-AFRICAN AMERICAN: >60 ML/MIN/1.73
GLUCOSE BLD-MCNC: 68 MG/DL (ref 74–99)
HCT VFR BLD CALC: 35.6 % (ref 34–48)
HEMOGLOBIN: 11.5 G/DL (ref 11.5–15.5)
Lab: NORMAL
MCH RBC QN AUTO: 27.6 PG (ref 26–35)
MCHC RBC AUTO-ENTMCNC: 32.3 % (ref 32–34.5)
MCV RBC AUTO: 85.6 FL (ref 80–99.9)
METHADONE SCREEN, URINE: NOT DETECTED
OPIATE SCREEN URINE: NOT DETECTED
OXYCODONE URINE: NOT DETECTED
PDW BLD-RTO: 14.8 FL (ref 11.5–15)
PHENCYCLIDINE SCREEN URINE: NOT DETECTED
PLATELET # BLD: 268 E9/L (ref 130–450)
PMV BLD AUTO: 9.9 FL (ref 7–12)
POTASSIUM SERPL-SCNC: 3.8 MMOL/L (ref 3.5–5)
RBC # BLD: 4.16 E12/L (ref 3.5–5.5)
SARS-COV-2, NAAT: NOT DETECTED
SODIUM BLD-SCNC: 135 MMOL/L (ref 132–146)
TOTAL PROTEIN: 7.5 G/DL (ref 6.4–8.3)
WBC # BLD: 9.6 E9/L (ref 4.5–11.5)

## 2021-12-23 PROCEDURE — 7100000001 HC PACU RECOVERY - ADDTL 15 MIN: Performed by: OBSTETRICS & GYNECOLOGY

## 2021-12-23 PROCEDURE — 2709999900 HC NON-CHARGEABLE SUPPLY: Performed by: OBSTETRICS & GYNECOLOGY

## 2021-12-23 PROCEDURE — 6360000002 HC RX W HCPCS: Performed by: OBSTETRICS & GYNECOLOGY

## 2021-12-23 PROCEDURE — 6360000002 HC RX W HCPCS: Performed by: NURSE ANESTHETIST, CERTIFIED REGISTERED

## 2021-12-23 PROCEDURE — 2580000003 HC RX 258: Performed by: NURSE ANESTHETIST, CERTIFIED REGISTERED

## 2021-12-23 PROCEDURE — 36415 COLL VENOUS BLD VENIPUNCTURE: CPT

## 2021-12-23 PROCEDURE — 86901 BLOOD TYPING SEROLOGIC RH(D): CPT

## 2021-12-23 PROCEDURE — 86850 RBC ANTIBODY SCREEN: CPT

## 2021-12-23 PROCEDURE — 3609079900 HC CESAREAN SECTION: Performed by: OBSTETRICS & GYNECOLOGY

## 2021-12-23 PROCEDURE — 7100000000 HC PACU RECOVERY - FIRST 15 MIN: Performed by: OBSTETRICS & GYNECOLOGY

## 2021-12-23 PROCEDURE — 80307 DRUG TEST PRSMV CHEM ANLYZR: CPT

## 2021-12-23 PROCEDURE — 86870 RBC ANTIBODY IDENTIFICATION: CPT

## 2021-12-23 PROCEDURE — 1220000001 HC SEMI PRIVATE L&D R&B

## 2021-12-23 PROCEDURE — 2500000003 HC RX 250 WO HCPCS: Performed by: NURSE ANESTHETIST, CERTIFIED REGISTERED

## 2021-12-23 PROCEDURE — 80053 COMPREHEN METABOLIC PANEL: CPT

## 2021-12-23 PROCEDURE — 6370000000 HC RX 637 (ALT 250 FOR IP): Performed by: OBSTETRICS & GYNECOLOGY

## 2021-12-23 PROCEDURE — 86880 COOMBS TEST DIRECT: CPT

## 2021-12-23 PROCEDURE — 88307 TISSUE EXAM BY PATHOLOGIST: CPT

## 2021-12-23 PROCEDURE — 3700000001 HC ADD 15 MINUTES (ANESTHESIA): Performed by: OBSTETRICS & GYNECOLOGY

## 2021-12-23 PROCEDURE — 3700000000 HC ANESTHESIA ATTENDED CARE: Performed by: OBSTETRICS & GYNECOLOGY

## 2021-12-23 PROCEDURE — 86900 BLOOD TYPING SEROLOGIC ABO: CPT

## 2021-12-23 PROCEDURE — 87635 SARS-COV-2 COVID-19 AMP PRB: CPT

## 2021-12-23 PROCEDURE — 85027 COMPLETE CBC AUTOMATED: CPT

## 2021-12-23 PROCEDURE — 6360000002 HC RX W HCPCS: Performed by: ANESTHESIOLOGY

## 2021-12-23 RX ORDER — PYRIDOSTIGMINE BROMIDE 60 MG/1
30 TABLET ORAL EVERY 8 HOURS SCHEDULED
Status: DISCONTINUED | OUTPATIENT
Start: 2021-12-24 | End: 2021-12-24

## 2021-12-23 RX ORDER — TRISODIUM CITRATE DIHYDRATE AND CITRIC ACID MONOHYDRATE 500; 334 MG/5ML; MG/5ML
30 SOLUTION ORAL ONCE
Status: COMPLETED | OUTPATIENT
Start: 2021-12-23 | End: 2021-12-23

## 2021-12-23 RX ORDER — SODIUM CHLORIDE 0.9 % (FLUSH) 0.9 %
10 SYRINGE (ML) INJECTION EVERY 12 HOURS SCHEDULED
Status: DISCONTINUED | OUTPATIENT
Start: 2021-12-23 | End: 2021-12-24

## 2021-12-23 RX ORDER — DIPHENHYDRAMINE HYDROCHLORIDE 50 MG/ML
25 INJECTION INTRAMUSCULAR; INTRAVENOUS EVERY 6 HOURS PRN
Status: DISCONTINUED | OUTPATIENT
Start: 2021-12-23 | End: 2021-12-24

## 2021-12-23 RX ORDER — SODIUM CHLORIDE 0.9 % (FLUSH) 0.9 %
10 SYRINGE (ML) INJECTION PRN
Status: DISCONTINUED | OUTPATIENT
Start: 2021-12-23 | End: 2021-12-24

## 2021-12-23 RX ORDER — PHENYLEPHRINE HYDROCHLORIDE 10 MG/ML
INJECTION INTRAVENOUS PRN
Status: DISCONTINUED | OUTPATIENT
Start: 2021-12-23 | End: 2021-12-23 | Stop reason: SDUPTHER

## 2021-12-23 RX ORDER — NALOXONE HYDROCHLORIDE 0.4 MG/ML
0.4 INJECTION, SOLUTION INTRAMUSCULAR; INTRAVENOUS; SUBCUTANEOUS PRN
Status: DISCONTINUED | OUTPATIENT
Start: 2021-12-23 | End: 2021-12-24

## 2021-12-23 RX ORDER — PYRIDOSTIGMINE BROMIDE 60 MG/1
60 TABLET ORAL EVERY 8 HOURS SCHEDULED
Status: DISCONTINUED | OUTPATIENT
Start: 2021-12-23 | End: 2021-12-23

## 2021-12-23 RX ORDER — SODIUM CHLORIDE 9 MG/ML
25 INJECTION, SOLUTION INTRAVENOUS PRN
Status: DISCONTINUED | OUTPATIENT
Start: 2021-12-23 | End: 2021-12-24

## 2021-12-23 RX ORDER — FOLIC ACID 1 MG/1
1 TABLET ORAL DAILY
Status: DISCONTINUED | OUTPATIENT
Start: 2021-12-23 | End: 2021-12-24

## 2021-12-23 RX ORDER — SODIUM CHLORIDE, SODIUM LACTATE, POTASSIUM CHLORIDE, CALCIUM CHLORIDE 600; 310; 30; 20 MG/100ML; MG/100ML; MG/100ML; MG/100ML
INJECTION, SOLUTION INTRAVENOUS CONTINUOUS
Status: DISCONTINUED | OUTPATIENT
Start: 2021-12-23 | End: 2021-12-24

## 2021-12-23 RX ORDER — ONDANSETRON 2 MG/ML
4 INJECTION INTRAMUSCULAR; INTRAVENOUS EVERY 6 HOURS PRN
Status: DISCONTINUED | OUTPATIENT
Start: 2021-12-23 | End: 2021-12-24

## 2021-12-23 RX ORDER — BUPIVACAINE HYDROCHLORIDE 7.5 MG/ML
INJECTION, SOLUTION INTRASPINAL PRN
Status: DISCONTINUED | OUTPATIENT
Start: 2021-12-23 | End: 2021-12-23 | Stop reason: SDUPTHER

## 2021-12-23 RX ORDER — ASPIRIN 81 MG/1
81 TABLET, CHEWABLE ORAL DAILY
Status: DISCONTINUED | OUTPATIENT
Start: 2021-12-23 | End: 2021-12-24

## 2021-12-23 RX ORDER — MORPHINE SULFATE 1 MG/ML
INJECTION, SOLUTION EPIDURAL; INTRATHECAL; INTRAVENOUS PRN
Status: DISCONTINUED | OUTPATIENT
Start: 2021-12-23 | End: 2021-12-23 | Stop reason: SDUPTHER

## 2021-12-23 RX ORDER — SODIUM CHLORIDE, SODIUM LACTATE, POTASSIUM CHLORIDE, AND CALCIUM CHLORIDE .6; .31; .03; .02 G/100ML; G/100ML; G/100ML; G/100ML
1000 INJECTION, SOLUTION INTRAVENOUS ONCE
Status: DISCONTINUED | OUTPATIENT
Start: 2021-12-23 | End: 2021-12-24

## 2021-12-23 RX ORDER — KETOROLAC TROMETHAMINE 15 MG/ML
15 INJECTION, SOLUTION INTRAMUSCULAR; INTRAVENOUS EVERY 6 HOURS
Status: DISCONTINUED | OUTPATIENT
Start: 2021-12-23 | End: 2021-12-24

## 2021-12-23 RX ORDER — SODIUM CHLORIDE, SODIUM LACTATE, POTASSIUM CHLORIDE, CALCIUM CHLORIDE 600; 310; 30; 20 MG/100ML; MG/100ML; MG/100ML; MG/100ML
INJECTION, SOLUTION INTRAVENOUS CONTINUOUS PRN
Status: DISCONTINUED | OUTPATIENT
Start: 2021-12-23 | End: 2021-12-23 | Stop reason: SDUPTHER

## 2021-12-23 RX ORDER — LIDOCAINE HYDROCHLORIDE 10 MG/ML
INJECTION, SOLUTION INFILTRATION; PERINEURAL PRN
Status: DISCONTINUED | OUTPATIENT
Start: 2021-12-23 | End: 2021-12-23 | Stop reason: SDUPTHER

## 2021-12-23 RX ORDER — OXYCODONE HYDROCHLORIDE 5 MG/1
5 TABLET ORAL EVERY 4 HOURS PRN
Status: DISCONTINUED | OUTPATIENT
Start: 2021-12-23 | End: 2021-12-24 | Stop reason: SDUPTHER

## 2021-12-23 RX ORDER — ACETAMINOPHEN 500 MG
1000 TABLET ORAL EVERY 8 HOURS PRN
Status: DISCONTINUED | OUTPATIENT
Start: 2021-12-23 | End: 2021-12-24 | Stop reason: SDUPTHER

## 2021-12-23 RX ADMIN — SODIUM CHLORIDE, POTASSIUM CHLORIDE, SODIUM LACTATE AND CALCIUM CHLORIDE: 600; 310; 30; 20 INJECTION, SOLUTION INTRAVENOUS at 16:41

## 2021-12-23 RX ADMIN — Medication 999 ML/HR: at 16:54

## 2021-12-23 RX ADMIN — METOPROLOL TARTRATE 12.5 MG: 25 TABLET, FILM COATED ORAL at 22:16

## 2021-12-23 RX ADMIN — SODIUM CITRATE AND CITRIC ACID MONOHYDRATE 30 ML: 500; 334 SOLUTION ORAL at 15:56

## 2021-12-23 RX ADMIN — ASPIRIN 81 MG: 81 TABLET, CHEWABLE ORAL at 22:15

## 2021-12-23 RX ADMIN — PYRIDOSTIGMINE BROMIDE 30 MG: 60 TABLET ORAL at 22:15

## 2021-12-23 RX ADMIN — SODIUM CHLORIDE, POTASSIUM CHLORIDE, SODIUM LACTATE AND CALCIUM CHLORIDE: 600; 310; 30; 20 INJECTION, SOLUTION INTRAVENOUS at 17:01

## 2021-12-23 RX ADMIN — Medication 2000 MG: at 16:17

## 2021-12-23 RX ADMIN — PHENYLEPHRINE HYDROCHLORIDE 100 MCG: 10 INJECTION INTRAVENOUS at 17:01

## 2021-12-23 RX ADMIN — LIDOCAINE HYDROCHLORIDE 3 ML: 10 INJECTION, SOLUTION INFILTRATION; PERINEURAL at 16:36

## 2021-12-23 RX ADMIN — BUPIVACAINE HYDROCHLORIDE IN DEXTROSE 1.8 ML: 7.5 INJECTION, SOLUTION SUBARACHNOID at 16:36

## 2021-12-23 RX ADMIN — PHENYLEPHRINE HYDROCHLORIDE 100 MCG: 10 INJECTION INTRAVENOUS at 17:10

## 2021-12-23 RX ADMIN — PHENYLEPHRINE HYDROCHLORIDE 100 MCG: 10 INJECTION INTRAVENOUS at 16:52

## 2021-12-23 RX ADMIN — FOLIC ACID 1 MG: 1 TABLET ORAL at 22:17

## 2021-12-23 RX ADMIN — PHENYLEPHRINE HYDROCHLORIDE 200 MCG: 10 INJECTION INTRAVENOUS at 16:46

## 2021-12-23 RX ADMIN — MORPHINE SULFATE 0.1 MG: 1 INJECTION, SOLUTION EPIDURAL; INTRATHECAL; INTRAVENOUS at 16:36

## 2021-12-23 RX ADMIN — SODIUM CHLORIDE, POTASSIUM CHLORIDE, SODIUM LACTATE AND CALCIUM CHLORIDE: 600; 310; 30; 20 INJECTION, SOLUTION INTRAVENOUS at 16:19

## 2021-12-23 RX ADMIN — KETOROLAC TROMETHAMINE 15 MG: 15 INJECTION, SOLUTION INTRAMUSCULAR; INTRAVENOUS at 22:14

## 2021-12-23 RX ADMIN — PHENYLEPHRINE HYDROCHLORIDE 100 MCG: 10 INJECTION INTRAVENOUS at 16:40

## 2021-12-23 ASSESSMENT — PULMONARY FUNCTION TESTS
PIF_VALUE: 0
PIF_VALUE: 1
PIF_VALUE: 0
PIF_VALUE: 1
PIF_VALUE: 0
PIF_VALUE: 18
PIF_VALUE: 0
PIF_VALUE: 1
PIF_VALUE: 0
PIF_VALUE: 1
PIF_VALUE: 0

## 2021-12-23 ASSESSMENT — PAIN SCALES - GENERAL: PAINLEVEL_OUTOF10: 4

## 2021-12-23 NOTE — ANESTHESIA PRE PROCEDURE
Department of Anesthesiology  Preprocedure Note       Name:  Joseph Maravilla   Age:  32 y.o.  :  1990                                          MRN:  [id number]         Date:  2021      Surgeon: Ad Pichardo):  Noé Mckinney MD    Procedure: Procedure(s):   SECTION    Medications prior to admission:   Prior to Admission medications    Medication Sig Start Date End Date Taking?  Authorizing Provider   metoprolol succinate (TOPROL XL) 25 MG extended release tablet Take 0.5 tablets by mouth nightly 12/10/21  Yes Can Ramos DO   diclofenac sodium (VOLTAREN) 1 % GEL Apply 2 g topically 4 times daily 12/10/21  Yes Can Ramos DO   Insulin NPH Human, Isophane, (NOVOLIN N SC) Inject into the skin   Yes Historical Provider, MD   Insulin Regular Human (NOVOLIN R IJ) Inject as directed   Yes Historical Provider, MD   metFORMIN (GLUCOPHAGE) 1000 MG tablet Take 2,000 mg by mouth nightly    Yes Historical Provider, MD   enoxaparin (LOVENOX) 100 MG/ML injection Inject 100 mg into the skin daily 21  Yes Historical Provider, MD   Prenatal Vit-DSS-Fe Cbn-FA (PRENATAL AD PO) Take by mouth   Yes Historical Provider, MD   diphenhydrAMINE (BENADRYL) 25 MG tablet Take 25 mg by mouth nightly as needed for Sleep   Yes Historical Provider, MD   aspirin 81 MG EC tablet Take 81 mg by mouth 2 times daily   Yes Historical Provider, MD   Cholecalciferol (VITAMIN D3) 50 MCG (2000 UT) TABS Take one tablet daily 21  Yes Can Ramos DO   albuterol sulfate HFA (VENTOLIN HFA) 108 (90 Base) MCG/ACT inhaler Inhale 2 puffs into the lungs every 6 hours as needed for Wheezing 4/15/21  Yes Can Ramos DO   folic acid (FOLVITE) 1 MG tablet TAKE 1 TABLET BY MOUTH ONCE DAILY  Patient taking differently: Take 3 mg by mouth daily  21  Yes Can Ramos DO   pyridostigmine (MESTINON) 60 MG tablet Take 1 tablet by mouth 3 times daily 17  Yes Moody Templeton Smoker, DO citalopram (CELEXA) 20 MG tablet Take 20 mg by mouth daily   Yes Historical Provider, MD   acetaminophen (TYLENOL) 500 MG tablet Take 500 mg by mouth every 6 hours as needed    Yes Historical Provider, MD   Handicap Placard [address] [facility] by Does not apply route Expires in 1 year 12/10/21   Moody Esquivel,        Current medications:    Current Facility-Administered Medications   Medication Dose Route Frequency Provider Last Rate Last Admin    lactated ringers infusion   IntraVENous Continuous Amine JAM Burk MD        lactated ringers bolus  1,000 mL IntraVENous Once Amine JAM Burk MD        sodium chloride flush 0.9 % injection 10 mL  10 mL IntraVENous 2 times per day Amine JAM Burk MD        sodium chloride flush 0.9 % injection 10 mL  10 mL IntraVENous PRN Amine JAM Burk MD        0.9 % sodium chloride infusion  25 mL IntraVENous PRN Michelle Burk MD        citric acid-sodium citrate (BICITRA) solution 30 mL  30 mL Oral Once Michelle Burk MD           Allergies: Allergies   Allergen Reactions    Latex Dermatitis    Prednisone Other (See Comments)     Becomes aggressive per mother    Prozac [Fluoxetine Hcl] Other (See Comments)     Suicidal thoughts       Problem List:    Patient Active Problem List   Diagnosis Code    POTS (postural orthostatic tachycardia syndrome) I49.8    Hyperlipidemia E78.5    Factor V deficiency ([address] D68.2    Mild intermittent asthma without complication D38.29    Anxiety F41.9    Hashimoto's thyroiditis E06.3    Lumbar radiculopathy M54.16    Morbid obesity ([address] E66.01    Personal history of stroke with residual effects I69.30    Chronic migraine with aura G43. 109    Insulin controlled gestational diabetes mellitus (GDM) in second trimester O24.414    37 weeks gestation of pregnancy Z3A.37       Past Medical History:        Diagnosis Date    Anemia     Anxiety     Asthma     Blood clot embolism during pregnancy, antepartum     Cerebrovascular accident (CVA) due to vascular occlusion (HCC)     Chronic migraine     Coagulopathy (HCC)     Diabetes mellitus [facility]     DVT of leg (deep venous thrombosis) (HCC)     left    DVT of upper extremity (deep vein thrombosis) (HCC)     Left X 2    Heterozygous for BERT-1 4G allele     Low back pain     MTHFR mutation     POTS (postural orthostatic tachycardia syndrome)     Shingles     Thyroid disease        Past Surgical History:        Procedure Laterality Date    DILATION AND CURETTAGE OF UTERUS      FRACTURE SURGERY      10    NERVE BLOCK Left 2018    transforaminal epidural #1    [place] ZEHRA NOSE/CLEFT LIP/TIP Left 2018    LEFT L5-S1 TRANSFORAMINAL EPIDURAL STEROID INJECTION #1 performed by Osbaldo Howe DO at [facility] [address]        Social History:    Social History     Tobacco Use    Smoking status: Former Smoker     Packs/day: 0.50     Types: Cigarettes     Start date:      Quit date: 2021     Years since quittin.5    Smokeless tobacco: Never Used   Substance Use Topics    Alcohol use: Not Currently                                Counseling given: Not Answered      Vital Signs (Current):   Vitals:    21 1300 21 1405   BP: 132/78 132/78   Pulse: 88 88   Resp:  16   Temp:  98.2 °F (36.8 °C)   TempSrc:  Oral   SpO2:  97%   Weight: 286 lb (129.7 kg)    Height: 5' 6\" (1.676 m)                                               BP Readings from Last 3 Encounters:   21 132/78   12/10/21 130/74   21 (!) 116/59       NPO Status:  0830 SOLID FOODS                                                                               BMI:   Wt Readings from Last 3 Encounters:   21 286 lb (129.7 kg)   12/10/21 287 lb (130.2 kg)   10/13/21 285 lb (129.3 kg)     Body mass index is 46.16 kg/m².     CBC:   Lab Results   Component Value Date    WBC 9.6 2021    RBC 4.16 2021    HGB 11.5 2021    HCT 35.6 12/23/2021    MCV 85.6 12/23/2021    RDW 14.8 12/23/2021     12/23/2021       CMP:   Lab Results   Component Value Date     10/25/2021    K 3.7 10/25/2021    K 3.8 07/13/2021     10/25/2021    CO2 22 10/25/2021    BUN 7 10/25/2021    CREATININE 0.7 10/25/2021    CREATININE 0.7 10/25/2021    GFRAA >60 10/25/2021    LABGLOM >60 10/25/2021    GLUCOSE 90 10/25/2021    PROT 6.9 10/25/2021    CALCIUM 10.0 10/25/2021    BILITOT <0.2 10/25/2021    ALKPHOS 65 10/25/2021    AST 14 10/25/2021    ALT 12 10/25/2021       POC Tests: No results for input(s): POCGLU, POCNA, POCK, POCCL, POCBUN, POCHEMO, POCHCT in the last 72 hours. Coags:   Lab Results   Component Value Date    PROTIME 11.5 06/25/2017    INR 1.1 06/25/2017    APTT 30.3 06/25/2017       HCG (If Applicable):   Lab Results   Component Value Date    PREGTESTUR negative 06/26/2018        ABGs: No results found for: PHART, PO2ART, RQS9OHT, JDA2LET, BEART, T8QJIENW     Type & Screen (If Applicable):  No results found for: LABABO, LABRH    Drug/Infectious Status (If Applicable):  No results found for: HIV, HEPCAB    COVID-19 Screening (If Applicable): No results found for: COVID19        Anesthesia Evaluation  Patient summary reviewed no history of anesthetic complications:   Airway: Mallampati: II  TM distance: >3 FB   Neck ROM: full  Mouth opening: > = 3 FB Dental: normal exam         Pulmonary: breath sounds clear to auscultation  (+) asthma:                            Cardiovascular:            Rhythm: regular             Beta Blocker:  Dose within 24 Hrs      ROS comment: POTS     Neuro/Psych:   (+) CVA:, neuromuscular disease:, headaches: migraine headaches,              ROS comment: LUMBAR RADICULOPATHY   GI/Hepatic/Renal:   (+) morbid obesity          Endo/Other:    (+) Diabetesusing insulin, hypothyroidism, blood dyscrasia::., .                  ROS comment: HASHIMOTO'S  Abdominal:             Vascular: negative vascular ROS. Other Findings:           Anesthesia Plan      spinal     ASA 3     (LAST DOSE OF HEPARIN INJECTION 0830)  Induction: intravenous. MIPS: Postoperative opioids intended and Prophylactic antiemetics administered. Anesthetic plan and risks discussed with patient. Plan discussed with CRNA.             [address],    12/23/2021    As above  ELADIO Snow - SUNNY

## 2021-12-23 NOTE — ANESTHESIA PROCEDURE NOTES
Spinal Block    Patient location during procedure: OB  Start time: 12/23/2021 4:30 PM  End time: 12/23/2021 4:36 PM  Reason for block: primary anesthetic and at surgeon's request  Staffing  Performed: resident/CRNA   Anesthesiologist: Areli Short DO  Resident/CRNA: ELADIO Lopes CRNA  Preanesthetic Checklist  Completed: patient identified, IV checked, site marked, risks and benefits discussed, surgical consent, monitors and equipment checked, pre-op evaluation, timeout performed, anesthesia consent given, oxygen available and patient being monitored  Spinal Block  Patient position: sitting  Prep: Betadine and site prepped and draped  Patient monitoring: cardiac monitor, continuous pulse ox, continuous capnometry and frequent blood pressure checks  Approach: midline  Location: L3/L4  Provider prep: mask, sterile gloves and sterile gown  Local infiltration: lidocaine  Dose: 0.2  Agent: bupivacaine  Adjuvant: duramorph  Dose: 1.8  Dose: 1.8  Needle  Needle type: Pencan   Needle gauge: 25 G  Needle length: 3.5 in  Assessment  Sensory level: T4  Swirl obtained: Yes  CSF: clear  Attempts: 1  Hemodynamics: stable

## 2021-12-23 NOTE — H&P
Subjective:      Sofia Baxter is an 32 y.o. female at 40 and 5/7 weeks gestation presenting with   Chief Complaint   Patient presents with    Scheduled    Patient was referred today from Dr. Heri Neri office for immediate delivery. Ultrasound today confirmed the presence of macrosomia and oligohydramnios. Patient has a history of insulin-dependent diabetes and thrombophilia and was recently switched from Lovenox to heparin. She is also complaining of contractions every a few minutes lasting few seconds. Other associated symptoms include low back pain. Fetal Movement: normal.  Past Medical History:   Diagnosis Date    Anemia     Anxiety     Asthma     Blood clot embolism during pregnancy, antepartum     Cerebrovascular accident (CVA) due to vascular occlusion (HCC)     Chronic migraine     Co[facility] ([facility])     Diabetes mellitus ([address]     DVT of leg (deep venous thrombosis) ([facility] [address] [facility]     left    DVT of upper extremity (deep vein thrombosis) ([facility])     Left X 2    Heterozygous for BERT-1 4G allele     Low back pain     MTHFR mutation     POTS (postural orthostatic tachycardia syndrome)     Shingles     Thyroid disease        Review of Systems  Pertinent items are noted in HPI. Objective:      /78   Pulse 88   Temp 98.2 °F (36.8 °C) (Oral)   Resp 16   Ht 5' 6\" (1.676 m)   Wt 286 lb (129.7 kg)   LMP 2021   SpO2 97%   BMI 46.16 kg/m²   Blood pressure 132/78, pulse 88, temperature 98.2 °F (36.8 °C), temperature source Oral, resp. rate 16, height 5' 6\" (1.676 m), weight 286 lb (129.7 kg), last menstrual period 2021, SpO2 97 %, not currently breastfeeding. General:   alert, appears stated age and cooperative   Cervix:   closed.    FHT:   140 BPM     Lab Review    CBC:   Lab Results   Component Value Date    WBC 9.6 2021    RBC 4.16 2021    HGB 11.5 2021    HCT 35.6 2021    MCV 85.6 2021    MCH 27.6 2021    MCHC 32.3 2021 RDW 14.8 2021     2021    MPV 9.9 2021     CMP:    Lab Results   Component Value Date     2021    K 3.8 2021    K 3.8 2021     2021    CO2 17 2021    BUN 9 2021    CREATININE 0.7 2021    GFRAA >60 2021    LABGLOM >60 2021    GLUCOSE 68 2021    PROT 7.5 2021    LABALBU 3.7 2021    CALCIUM 11.0 2021    BILITOT <0.2 2021    ALKPHOS 103 2021    AST 19 2021    ALT 12 2021     U/A:    Lab Results   Component Value Date    COLORU Yellow 10/20/2021    PHUR 6.5 10/20/2021    WBCUA NONE 10/20/2021    RBCUA NONE 10/20/2021    BACTERIA NONE SEEN 10/20/2021    CLARITYU Clear 10/20/2021    SPECGRAV 1.010 10/20/2021    LEUKOCYTESUR Negative 10/20/2021    UROBILINOGEN 0.2 10/20/2021    BILIRUBINUR Negative 10/20/2021    BLOODU MODERATE 10/20/2021    GLUCOSEU Negative 10/20/2021          Assessment:     37 weeks and 5-day pregnancy  Oligohydramnios  Gestational diabetes, insulin-dependent  Macrosomia  Thrombophilia    Plan:     Primary     Michelle Burk MD,MD,2021 5:18 PM

## 2021-12-24 LAB
FETAL SCREEN: NORMAL
HCT VFR BLD CALC: 33.4 % (ref 34–48)
HEMOGLOBIN: 10.8 G/DL (ref 11.5–15.5)
METER GLUCOSE: 107 MG/DL (ref 74–99)
METER GLUCOSE: 121 MG/DL (ref 74–99)
METER GLUCOSE: 147 MG/DL (ref 74–99)

## 2021-12-24 PROCEDURE — 85461 HEMOGLOBIN FETAL: CPT

## 2021-12-24 PROCEDURE — 6360000002 HC RX W HCPCS: Performed by: OBSTETRICS & GYNECOLOGY

## 2021-12-24 PROCEDURE — 6360000002 HC RX W HCPCS: Performed by: ANESTHESIOLOGY

## 2021-12-24 PROCEDURE — 85014 HEMATOCRIT: CPT

## 2021-12-24 PROCEDURE — 1220000001 HC SEMI PRIVATE L&D R&B

## 2021-12-24 PROCEDURE — 82962 GLUCOSE BLOOD TEST: CPT

## 2021-12-24 PROCEDURE — 36415 COLL VENOUS BLD VENIPUNCTURE: CPT

## 2021-12-24 PROCEDURE — 85018 HEMOGLOBIN: CPT

## 2021-12-24 PROCEDURE — 6370000000 HC RX 637 (ALT 250 FOR IP): Performed by: OBSTETRICS & GYNECOLOGY

## 2021-12-24 RX ORDER — DOCUSATE SODIUM 100 MG/1
100 CAPSULE, LIQUID FILLED ORAL 2 TIMES DAILY
Status: DISCONTINUED | OUTPATIENT
Start: 2021-12-24 | End: 2021-12-26 | Stop reason: HOSPADM

## 2021-12-24 RX ORDER — ONDANSETRON 4 MG/1
8 TABLET, ORALLY DISINTEGRATING ORAL EVERY 8 HOURS PRN
Status: DISCONTINUED | OUTPATIENT
Start: 2021-12-24 | End: 2021-12-26 | Stop reason: HOSPADM

## 2021-12-24 RX ORDER — VITAMIN B COMPLEX
2000 TABLET ORAL DAILY
Status: DISCONTINUED | OUTPATIENT
Start: 2021-12-24 | End: 2021-12-26 | Stop reason: HOSPADM

## 2021-12-24 RX ORDER — CITALOPRAM 20 MG/1
20 TABLET ORAL DAILY
Status: DISCONTINUED | OUTPATIENT
Start: 2021-12-24 | End: 2021-12-26 | Stop reason: HOSPADM

## 2021-12-24 RX ORDER — METOPROLOL SUCCINATE 25 MG/1
12.5 TABLET, EXTENDED RELEASE ORAL NIGHTLY
Status: DISCONTINUED | OUTPATIENT
Start: 2021-12-24 | End: 2021-12-26 | Stop reason: HOSPADM

## 2021-12-24 RX ORDER — SODIUM CHLORIDE 0.9 % (FLUSH) 0.9 %
10 SYRINGE (ML) INJECTION EVERY 12 HOURS SCHEDULED
Status: DISCONTINUED | OUTPATIENT
Start: 2021-12-24 | End: 2021-12-26 | Stop reason: HOSPADM

## 2021-12-24 RX ORDER — OXYCODONE HYDROCHLORIDE AND ACETAMINOPHEN 5; 325 MG/1; MG/1
1 TABLET ORAL EVERY 4 HOURS PRN
Status: DISCONTINUED | OUTPATIENT
Start: 2021-12-24 | End: 2021-12-26 | Stop reason: HOSPADM

## 2021-12-24 RX ORDER — IBUPROFEN 800 MG/1
800 TABLET ORAL EVERY 6 HOURS PRN
Status: DISCONTINUED | OUTPATIENT
Start: 2021-12-24 | End: 2021-12-26 | Stop reason: HOSPADM

## 2021-12-24 RX ORDER — PYRIDOSTIGMINE BROMIDE 60 MG/1
60 TABLET ORAL 3 TIMES DAILY
Status: DISCONTINUED | OUTPATIENT
Start: 2021-12-24 | End: 2021-12-24

## 2021-12-24 RX ORDER — ALBUTEROL SULFATE 2.5 MG/3ML
2.5 SOLUTION RESPIRATORY (INHALATION) EVERY 6 HOURS PRN
Status: DISCONTINUED | OUTPATIENT
Start: 2021-12-24 | End: 2021-12-26 | Stop reason: HOSPADM

## 2021-12-24 RX ORDER — SODIUM CHLORIDE 0.9 % (FLUSH) 0.9 %
10 SYRINGE (ML) INJECTION PRN
Status: DISCONTINUED | OUTPATIENT
Start: 2021-12-24 | End: 2021-12-26 | Stop reason: HOSPADM

## 2021-12-24 RX ORDER — PYRIDOSTIGMINE BROMIDE 60 MG/1
30 TABLET ORAL 2 TIMES DAILY
Status: DISCONTINUED | OUTPATIENT
Start: 2021-12-24 | End: 2021-12-26 | Stop reason: HOSPADM

## 2021-12-24 RX ORDER — FOLIC ACID 1 MG/1
1 TABLET ORAL DAILY
Status: DISCONTINUED | OUTPATIENT
Start: 2021-12-24 | End: 2021-12-26 | Stop reason: HOSPADM

## 2021-12-24 RX ORDER — ASPIRIN 81 MG/1
81 TABLET ORAL 2 TIMES DAILY
Status: DISCONTINUED | OUTPATIENT
Start: 2021-12-24 | End: 2021-12-26 | Stop reason: HOSPADM

## 2021-12-24 RX ORDER — FERROUS SULFATE 325(65) MG
325 TABLET ORAL 2 TIMES DAILY WITH MEALS
Status: DISCONTINUED | OUTPATIENT
Start: 2021-12-24 | End: 2021-12-26 | Stop reason: HOSPADM

## 2021-12-24 RX ORDER — LIDOCAINE HYDROCHLORIDE 10 MG/ML
INJECTION, SOLUTION EPIDURAL; INFILTRATION; INTRACAUDAL; PERINEURAL
Status: DISPENSED
Start: 2021-12-24 | End: 2021-12-25

## 2021-12-24 RX ORDER — OXYCODONE HYDROCHLORIDE AND ACETAMINOPHEN 5; 325 MG/1; MG/1
2 TABLET ORAL EVERY 4 HOURS PRN
Status: DISCONTINUED | OUTPATIENT
Start: 2021-12-24 | End: 2021-12-26 | Stop reason: HOSPADM

## 2021-12-24 RX ORDER — SODIUM CHLORIDE 9 MG/ML
25 INJECTION, SOLUTION INTRAVENOUS PRN
Status: DISCONTINUED | OUTPATIENT
Start: 2021-12-24 | End: 2021-12-26 | Stop reason: HOSPADM

## 2021-12-24 RX ORDER — METHYLERGONOVINE MALEATE 0.2 MG/ML
200 INJECTION INTRAVENOUS PRN
Status: DISCONTINUED | OUTPATIENT
Start: 2021-12-24 | End: 2021-12-26 | Stop reason: HOSPADM

## 2021-12-24 RX ORDER — MODIFIED LANOLIN
OINTMENT (GRAM) TOPICAL
Status: DISCONTINUED | OUTPATIENT
Start: 2021-12-24 | End: 2021-12-26 | Stop reason: HOSPADM

## 2021-12-24 RX ORDER — SODIUM CHLORIDE, SODIUM LACTATE, POTASSIUM CHLORIDE, CALCIUM CHLORIDE 600; 310; 30; 20 MG/100ML; MG/100ML; MG/100ML; MG/100ML
INJECTION, SOLUTION INTRAVENOUS CONTINUOUS
Status: DISCONTINUED | OUTPATIENT
Start: 2021-12-24 | End: 2021-12-26 | Stop reason: HOSPADM

## 2021-12-24 RX ADMIN — OXYCODONE AND ACETAMINOPHEN 2 TABLET: 5; 325 TABLET ORAL at 19:34

## 2021-12-24 RX ADMIN — METOPROLOL SUCCINATE 12.5 MG: 25 TABLET, FILM COATED, EXTENDED RELEASE ORAL at 21:44

## 2021-12-24 RX ADMIN — IBUPROFEN 800 MG: 800 TABLET, FILM COATED ORAL at 18:05

## 2021-12-24 RX ADMIN — OXYCODONE AND ACETAMINOPHEN 2 TABLET: 5; 325 TABLET ORAL at 09:27

## 2021-12-24 RX ADMIN — ENOXAPARIN SODIUM 60 MG: 100 INJECTION SUBCUTANEOUS at 08:32

## 2021-12-24 RX ADMIN — FOLIC ACID 1 MG: 1 TABLET ORAL at 08:34

## 2021-12-24 RX ADMIN — ASPIRIN 81 MG: 81 TABLET, COATED ORAL at 08:33

## 2021-12-24 RX ADMIN — METFORMIN HYDROCHLORIDE 2000 MG: 500 TABLET ORAL at 21:44

## 2021-12-24 RX ADMIN — PYRIDOSTIGMINE BROMIDE 30 MG: 60 TABLET ORAL at 21:49

## 2021-12-24 RX ADMIN — DOCUSATE SODIUM 100 MG: 100 CAPSULE ORAL at 21:44

## 2021-12-24 RX ADMIN — ASPIRIN 81 MG: 81 TABLET, COATED ORAL at 21:44

## 2021-12-24 RX ADMIN — DOCUSATE SODIUM 100 MG: 100 CAPSULE ORAL at 08:32

## 2021-12-24 RX ADMIN — PYRIDOSTIGMINE BROMIDE 30 MG: 60 TABLET ORAL at 08:33

## 2021-12-24 RX ADMIN — CITALOPRAM HYDROBROMIDE 20 MG: 20 TABLET ORAL at 09:27

## 2021-12-24 RX ADMIN — OXYCODONE AND ACETAMINOPHEN 2 TABLET: 5; 325 TABLET ORAL at 14:56

## 2021-12-24 RX ADMIN — KETOROLAC TROMETHAMINE 15 MG: 15 INJECTION, SOLUTION INTRAMUSCULAR; INTRAVENOUS at 03:52

## 2021-12-24 RX ADMIN — Medication 2000 UNITS: at 11:06

## 2021-12-24 ASSESSMENT — PAIN SCALES - GENERAL
PAINLEVEL_OUTOF10: 8
PAINLEVEL_OUTOF10: 5
PAINLEVEL_OUTOF10: 4
PAINLEVEL_OUTOF10: 5
PAINLEVEL_OUTOF10: 7

## 2021-12-24 NOTE — PROGRESS NOTES
[employer] [place] [facility] screening results were discussed with parent. Questions answered. Brochure given to parent. Advised to monitor developmental milestones and contact physician for any concerns.    Mina Nguyễn, AuD

## 2021-12-24 NOTE — PLAN OF CARE
Problem: Skin Integrity:  Goal: Will show no infection signs and symptoms  Description: Will show no infection signs and symptoms  12/24/2021 0016 by Loida Boss RN  Outcome: Met This Shift  12/24/2021 0015 by Loida Boss RN  Outcome: Met This Shift  Goal: Absence of new skin breakdown  Description: Absence of new skin breakdown  12/24/2021 0016 by Loida Boss RN  Outcome: Met This Shift  12/24/2021 0015 by Loida Boss RN  Outcome: Met This Shift     Problem: Anxiety:  Goal: Level of anxiety will decrease  Description: Level of anxiety will decrease  Outcome: Met This Shift     Problem: Aspiration - Risk of:  Goal: Absence of aspiration  Description: Absence of aspiration  Outcome: Met This Shift     Problem: Tissue Perfusion - Uteroplacental, Altered:  Goal: Absence of abnormal fetal heart rate pattern  Description: Absence of abnormal fetal heart rate pattern  Outcome: Met This Shift     Problem: Venous Thromboembolism - Risk of:  Goal: Will show no signs or symptoms of venous thromboembolism  Description: Will show no signs or symptoms of venous thromboembolism  Outcome: Met This Shift

## 2021-12-24 NOTE — PLAN OF CARE
Problem: Skin Integrity:  Goal: Will show no infection signs and symptoms  Description: Will show no infection signs and symptoms  12/24/2021 0016 by Maryann Sampson RN  Outcome: Met This Shift  12/24/2021 0015 by Maryann Sampson RN  Outcome: Met This Shift  Goal: Absence of new skin breakdown  Description: Absence of new skin breakdown  12/24/2021 0016 by Maryann Sampson RN  Outcome: Met This Shift  12/24/2021 0015 by Maryann Sampson RN  Outcome: Met This Shift     Problem: Pain:  Goal: Pain level will decrease  Description: Pain level will decrease  Outcome: Met This Shift     Problem: Anxiety:  Goal: Level of anxiety will decrease  Description: Level of anxiety will decrease  12/24/2021 1004 by Peng Retana RN  Outcome: Completed  12/24/2021 0016 by Maryann Sampson RN  Outcome: Met This Shift     Problem: Aspiration - Risk of:  Goal: Absence of aspiration  Description: Absence of aspiration  12/24/2021 1004 by Peng Retana RN  Outcome: Completed  12/24/2021 0016 by Maryann Sampson RN  Outcome: Met This Shift     Problem: Tissue Perfusion - Uteroplacental, Altered:  Goal: Absence of abnormal fetal heart rate pattern  Description: Absence of abnormal fetal heart rate pattern  12/24/2021 1004 by Peng Retana RN  Outcome: Completed  12/24/2021 0016 by Maryann Sampson RN  Outcome: Met This Shift     Problem: Venous Thromboembolism - Risk of:  Goal: Will show no signs or symptoms of venous thromboembolism  Description: Will show no signs or symptoms of venous thromboembolism  12/24/2021 1004 by Pneg Retana RN  Outcome: Completed  12/24/2021 0016 by Maryann Sampson RN  Outcome: Met This Shift

## 2021-12-24 NOTE — CARE COORDINATION
Medicare rights letter given. Separate signed copy in chart.   Electronically signed by MINA Littlejohn on 12/24/2021 at 11:53 AM

## 2021-12-24 NOTE — PLAN OF CARE
Problem: Infection - Surgical Site:  Goal: Will show no infection signs and symptoms  Description: Will show no infection signs and symptoms  Outcome: Met This Shift   Drsg intact  Problem: Pain - Acute:  Goal: Pain level will decrease  Description: Pain level will decrease  Outcome: Met This Shift   Medicate as needed, comfort measures offered

## 2021-12-24 NOTE — PLAN OF CARE
Problem: Skin Integrity:  Goal: Will show no infection signs and symptoms  Description: Will show no infection signs and symptoms  12/24/2021 1005 by Vamshi Ramirez RN  Outcome: Completed  12/24/2021 0016 by Andriy Recinos RN  Outcome: Met This Shift  12/24/2021 0015 by Andriy Recinos RN  Outcome: Met This Shift  Goal: Absence of new skin breakdown  Description: Absence of new skin breakdown  12/24/2021 1005 by Vamshi Ramirez RN  Outcome: Completed  12/24/2021 0016 by Andriy Recinos RN  Outcome: Met This Shift  12/24/2021 0015 by Andriy Recinos RN  Outcome: Met This Shift     Problem: Anxiety:  Goal: Level of anxiety will decrease  Description: Level of anxiety will decrease  12/24/2021 1004 by Vamshi Ramirez RN  Outcome: Completed  12/24/2021 0016 by Andriy Recinos RN  Outcome: Met This Shift     Problem: Aspiration - Risk of:  Goal: Absence of aspiration  Description: Absence of aspiration  12/24/2021 1004 by Vamshi Ramirez RN  Outcome: Completed  12/24/2021 0016 by Andriy Recinos RN  Outcome: Met This Shift     Problem: Tissue Perfusion - Uteroplacental, Altered:  Goal: Absence of abnormal fetal heart rate pattern  Description: Absence of abnormal fetal heart rate pattern  12/24/2021 1004 by Vamshi Ramirez RN  Outcome: Completed  12/24/2021 0016 by Andriy Recinos RN  Outcome: Met This Shift     Problem: Venous Thromboembolism - Risk of:  Goal: Will show no signs or symptoms of venous thromboembolism  Description: Will show no signs or symptoms of venous thromboembolism  12/24/2021 1004 by Vamshi Ramirez RN  Outcome: Completed  12/24/2021 0016 by Andriy Recinos RN  Outcome: Met This Shift     Problem: Pain:  Goal: Control of acute pain  Description: Control of acute pain  Outcome: Completed  Goal: Control of chronic pain  Description: Control of chronic pain  Outcome: Completed

## 2021-12-24 NOTE — PROGRESS NOTES
Subjective:     Postpartum Day 1:  Delivery    The patient feels well. The patient denies emotional concerns. Pain is well controlled with current medications. The baby is well. Urinary output is adequate. The patient is ambulating well. The patient is tolerating a normal diet. Flatus has been passed. Objective:       Vitals:    21 0742   BP: (!) 92/55   Pulse: 67   Resp: 16   Temp: 97.6 °F (36.4 °C)   SpO2: 100%         General:    alert, appears stated age and cooperative   Bowel Sounds:  active   Lochia:  appropriate   Uterine Fundus:   firm   Incision:  healing well, no significant drainage, no dehiscence, no significant erythema   DVT Evaluation:  No evidence of DVT seen on physical exam.     CBC   Lab Results   Component Value Date    WBC 9.6 2021    HGB 10.8 (L) 2021    HCT 33.4 (L) 2021    MCV 85.6 2021     2021        Assessment:     Status post  section. Doing well postoperatively. Plan:     Continue current care.

## 2021-12-25 LAB
METER GLUCOSE: 112 MG/DL (ref 74–99)
METER GLUCOSE: 135 MG/DL (ref 74–99)
METER GLUCOSE: 94 MG/DL (ref 74–99)
METER GLUCOSE: 98 MG/DL (ref 74–99)
RHIG LOT NUMBER: NORMAL

## 2021-12-25 PROCEDURE — 82962 GLUCOSE BLOOD TEST: CPT

## 2021-12-25 PROCEDURE — 6360000002 HC RX W HCPCS: Performed by: OBSTETRICS & GYNECOLOGY

## 2021-12-25 PROCEDURE — 6370000000 HC RX 637 (ALT 250 FOR IP): Performed by: OBSTETRICS & GYNECOLOGY

## 2021-12-25 PROCEDURE — 96372 THER/PROPH/DIAG INJ SC/IM: CPT

## 2021-12-25 PROCEDURE — 1220000001 HC SEMI PRIVATE L&D R&B

## 2021-12-25 RX ORDER — BISACODYL 10 MG
10 SUPPOSITORY, RECTAL RECTAL DAILY PRN
Status: DISCONTINUED | OUTPATIENT
Start: 2021-12-25 | End: 2021-12-26 | Stop reason: HOSPADM

## 2021-12-25 RX ADMIN — ASPIRIN 81 MG: 81 TABLET, COATED ORAL at 21:19

## 2021-12-25 RX ADMIN — ASPIRIN 81 MG: 81 TABLET, COATED ORAL at 08:21

## 2021-12-25 RX ADMIN — OXYCODONE AND ACETAMINOPHEN 2 TABLET: 5; 325 TABLET ORAL at 04:57

## 2021-12-25 RX ADMIN — DOCUSATE SODIUM 100 MG: 100 CAPSULE ORAL at 21:19

## 2021-12-25 RX ADMIN — OXYCODONE AND ACETAMINOPHEN 2 TABLET: 5; 325 TABLET ORAL at 12:43

## 2021-12-25 RX ADMIN — FOLIC ACID 1 MG: 1 TABLET ORAL at 08:21

## 2021-12-25 RX ADMIN — OXYCODONE AND ACETAMINOPHEN 2 TABLET: 5; 325 TABLET ORAL at 00:02

## 2021-12-25 RX ADMIN — ENOXAPARIN SODIUM 60 MG: 100 INJECTION SUBCUTANEOUS at 09:53

## 2021-12-25 RX ADMIN — BISACODYL 10 MG: 10 SUPPOSITORY RECTAL at 22:10

## 2021-12-25 RX ADMIN — METFORMIN HYDROCHLORIDE 2000 MG: 500 TABLET ORAL at 21:19

## 2021-12-25 RX ADMIN — HUMAN RHO(D) IMMUNE GLOBULIN 300 MCG: 300 INJECTION, SOLUTION INTRAMUSCULAR at 10:00

## 2021-12-25 RX ADMIN — PYRIDOSTIGMINE BROMIDE 30 MG: 60 TABLET ORAL at 21:22

## 2021-12-25 RX ADMIN — IBUPROFEN 800 MG: 800 TABLET, FILM COATED ORAL at 18:26

## 2021-12-25 RX ADMIN — DOCUSATE SODIUM 100 MG: 100 CAPSULE ORAL at 08:21

## 2021-12-25 RX ADMIN — IBUPROFEN 800 MG: 800 TABLET, FILM COATED ORAL at 07:33

## 2021-12-25 RX ADMIN — Medication 2000 UNITS: at 08:21

## 2021-12-25 RX ADMIN — CITALOPRAM HYDROBROMIDE 20 MG: 20 TABLET ORAL at 08:21

## 2021-12-25 RX ADMIN — PYRIDOSTIGMINE BROMIDE 30 MG: 60 TABLET ORAL at 08:22

## 2021-12-25 RX ADMIN — METOPROLOL SUCCINATE 12.5 MG: 25 TABLET, FILM COATED, EXTENDED RELEASE ORAL at 21:20

## 2021-12-25 ASSESSMENT — PAIN SCALES - GENERAL
PAINLEVEL_OUTOF10: 7
PAINLEVEL_OUTOF10: 5
PAINLEVEL_OUTOF10: 7
PAINLEVEL_OUTOF10: 5
PAINLEVEL_OUTOF10: 6

## 2021-12-25 NOTE — PROGRESS NOTES
Assumed care of pt. Requesting ibuprofen for c/o pain rated 5/10 at surgical site. Assessment done. Requested baby from nursery. Baby with mom in room. ID check performed. States no other needs at this time.

## 2021-12-25 NOTE — PROGRESS NOTES
Assumed care of patient for [id number]. Patient medicated for pain and rest encouraged. No other complaints at this time.  Call light with in reach

## 2021-12-26 VITALS
HEART RATE: 103 BPM | WEIGHT: 286 LBS | BODY MASS INDEX: 45.96 KG/M2 | TEMPERATURE: 98.2 F | RESPIRATION RATE: 18 BRPM | SYSTOLIC BLOOD PRESSURE: 150 MMHG | DIASTOLIC BLOOD PRESSURE: 94 MMHG | HEIGHT: 66 IN | OXYGEN SATURATION: 97 %

## 2021-12-26 LAB
METER GLUCOSE: 109 MG/DL (ref 74–99)
METER GLUCOSE: 126 MG/DL (ref 74–99)

## 2021-12-26 PROCEDURE — 82962 GLUCOSE BLOOD TEST: CPT

## 2021-12-26 PROCEDURE — 90715 TDAP VACCINE 7 YRS/> IM: CPT | Performed by: OBSTETRICS & GYNECOLOGY

## 2021-12-26 PROCEDURE — 90471 IMMUNIZATION ADMIN: CPT | Performed by: OBSTETRICS & GYNECOLOGY

## 2021-12-26 PROCEDURE — 6360000002 HC RX W HCPCS: Performed by: OBSTETRICS & GYNECOLOGY

## 2021-12-26 PROCEDURE — 6370000000 HC RX 637 (ALT 250 FOR IP): Performed by: OBSTETRICS & GYNECOLOGY

## 2021-12-26 RX ORDER — DOCUSATE SODIUM 100 MG/1
100 CAPSULE, LIQUID FILLED ORAL 2 TIMES DAILY
Qty: 60 CAPSULE | Refills: 1 | Status: SHIPPED | OUTPATIENT
Start: 2021-12-26 | End: 2022-02-16 | Stop reason: CLARIF

## 2021-12-26 RX ORDER — IBUPROFEN 800 MG/1
800 TABLET ORAL EVERY 6 HOURS PRN
Qty: 120 TABLET | Refills: 0 | Status: SHIPPED | OUTPATIENT
Start: 2021-12-26 | End: 2022-03-28

## 2021-12-26 RX ORDER — OXYCODONE HYDROCHLORIDE AND ACETAMINOPHEN 5; 325 MG/1; MG/1
1 TABLET ORAL EVERY 6 HOURS PRN
Qty: 12 TABLET | Refills: 0 | Status: SHIPPED | OUTPATIENT
Start: 2021-12-26 | End: 2021-12-29

## 2021-12-26 RX ADMIN — Medication 2000 UNITS: at 09:03

## 2021-12-26 RX ADMIN — ASPIRIN 81 MG: 81 TABLET, COATED ORAL at 09:05

## 2021-12-26 RX ADMIN — ENOXAPARIN SODIUM 60 MG: 100 INJECTION SUBCUTANEOUS at 09:17

## 2021-12-26 RX ADMIN — DOCUSATE SODIUM 100 MG: 100 CAPSULE ORAL at 09:03

## 2021-12-26 RX ADMIN — TETANUS TOXOID, REDUCED DIPHTHERIA TOXOID AND ACELLULAR PERTUSSIS VACCINE, ADSORBED 0.5 ML: 5; 2.5; 8; 8; 2.5 SUSPENSION INTRAMUSCULAR at 09:56

## 2021-12-26 RX ADMIN — CITALOPRAM HYDROBROMIDE 20 MG: 20 TABLET ORAL at 09:05

## 2021-12-26 RX ADMIN — FOLIC ACID 1 MG: 1 TABLET ORAL at 09:03

## 2021-12-26 RX ADMIN — PYRIDOSTIGMINE BROMIDE 30 MG: 60 TABLET ORAL at 09:02

## 2021-12-26 NOTE — DISCHARGE SUMMARY
Obstetrical Discharge Form         Patients Name  Brendan Peters    Gestational Age:  37w6d    Antepartum complications: gestational diabetes, non-reassuring fetal testing and thrombophilia    Date of Delivery:   2021       4:52 PM      Type of Delivery:   , Low Transverse [251]   Rupture Date/time:    No data found      No data found     Presentation:    Vertex [1]   Position:                      Anesthesia:    Spinal [252]     Feeding method:         Delivered By:   Kane POLK     Baby:       Information for the patient's :  Vicki Panda [[id number]]          Intrapartum complications: None  Postpartum complications: none  Discharge Date:   2021  Discharge Condition: Stable  Disposition:   Home    Plan:   Follow up    in 2 weeks

## 2021-12-26 NOTE — PLAN OF CARE
Problem: Infection - Surgical Site:  Goal: Will show no infection signs and symptoms  Description: Will show no infection signs and symptoms  Outcome: Met This Shift     Problem: Mood - Altered:  Goal: Mood stable  Description: Mood stable  Outcome: Met This Shift

## 2021-12-26 NOTE — PROGRESS NOTES
Subjective:     Postpartum Day 2:  Delivery    The patient feels well. The patient denies emotional concerns. Pain is well controlled with current medications. The baby is well. Urinary output is adequate. The patient is ambulating well. The patient is tolerating a normal diet. Flatus has been passed. Objective:             General:    alert, appears stated age and cooperative   Bowel Sounds:  active   Lochia:  appropriate   Uterine Fundus:   firm   Incision:  healing well, no significant drainage, no dehiscence, no significant erythema   DVT Evaluation:  No evidence of DVT seen on physical exam.     CBC   Lab Results   Component Value Date    WBC 9.6 2021    HGB 10.8 (L) 2021    HCT 33.4 (L) 2021    MCV 85.6 2021     2021        Assessment:     Status post  section. Doing well postoperatively. Plan:     Continue current care.

## 2021-12-30 ENCOUNTER — TELEPHONE (OUTPATIENT)
Dept: NEUROLOGY | Age: 31
End: 2021-12-30

## 2022-01-27 ENCOUNTER — TELEPHONE (OUTPATIENT)
Dept: FAMILY MEDICINE CLINIC | Age: 32
End: 2022-01-27

## 2022-01-27 ENCOUNTER — OFFICE VISIT (OUTPATIENT)
Dept: FAMILY MEDICINE CLINIC | Age: 32
End: 2022-01-27
Payer: MEDICARE

## 2022-01-27 VITALS
BODY MASS INDEX: 42.59 KG/M2 | HEART RATE: 100 BPM | SYSTOLIC BLOOD PRESSURE: 128 MMHG | RESPIRATION RATE: 18 BRPM | DIASTOLIC BLOOD PRESSURE: 72 MMHG | TEMPERATURE: 97.8 F | WEIGHT: 265 LBS | OXYGEN SATURATION: 98 % | HEIGHT: 66 IN

## 2022-01-27 DIAGNOSIS — H66.001 NON-RECURRENT ACUTE SUPPURATIVE OTITIS MEDIA OF RIGHT EAR WITHOUT SPONTANEOUS RUPTURE OF TYMPANIC MEMBRANE: ICD-10-CM

## 2022-01-27 DIAGNOSIS — Z20.822 COVID-19 RULED OUT: Primary | ICD-10-CM

## 2022-01-27 DIAGNOSIS — Z20.822 EXPOSURE TO COVID-19 VIRUS: ICD-10-CM

## 2022-01-27 DIAGNOSIS — U07.1 COVID-19: Primary | ICD-10-CM

## 2022-01-27 LAB
Lab: ABNORMAL
QC PASS/FAIL: ABNORMAL
SARS-COV-2 RDRP RESP QL NAA+PROBE: POSITIVE

## 2022-01-27 PROCEDURE — 1036F TOBACCO NON-USER: CPT | Performed by: NURSE PRACTITIONER

## 2022-01-27 PROCEDURE — 99213 OFFICE O/P EST LOW 20 MIN: CPT | Performed by: NURSE PRACTITIONER

## 2022-01-27 PROCEDURE — G8427 DOCREV CUR MEDS BY ELIG CLIN: HCPCS | Performed by: NURSE PRACTITIONER

## 2022-01-27 PROCEDURE — G8417 CALC BMI ABV UP PARAM F/U: HCPCS | Performed by: NURSE PRACTITIONER

## 2022-01-27 PROCEDURE — 87635 SARS-COV-2 COVID-19 AMP PRB: CPT | Performed by: NURSE PRACTITIONER

## 2022-01-27 PROCEDURE — G8484 FLU IMMUNIZE NO ADMIN: HCPCS | Performed by: NURSE PRACTITIONER

## 2022-01-27 RX ORDER — BROMPHENIRAMINE MALEATE, PSEUDOEPHEDRINE HYDROCHLORIDE, AND DEXTROMETHORPHAN HYDROBROMIDE 2; 30; 10 MG/5ML; MG/5ML; MG/5ML
10 SYRUP ORAL 4 TIMES DAILY PRN
Qty: 240 ML | Refills: 0 | Status: SHIPPED
Start: 2022-01-27 | End: 2022-02-16 | Stop reason: CLARIF

## 2022-01-27 RX ORDER — CLONAZEPAM 1 MG/1
1 TABLET ORAL 2 TIMES DAILY PRN
COMMUNITY

## 2022-01-27 RX ORDER — AMOXICILLIN AND CLAVULANATE POTASSIUM 875; 125 MG/1; MG/1
1 TABLET, FILM COATED ORAL 2 TIMES DAILY
Qty: 20 TABLET | Refills: 0 | Status: SHIPPED | OUTPATIENT
Start: 2022-01-27 | End: 2022-02-06

## 2022-01-27 RX ORDER — METHYLPREDNISOLONE 4 MG/1
TABLET ORAL
Qty: 1 KIT | Refills: 0 | Status: SHIPPED
Start: 2022-01-27 | End: 2022-02-16 | Stop reason: CLARIF

## 2022-01-27 NOTE — PATIENT INSTRUCTIONS
Patient Education        Learning About Coronavirus [phone])  What is coronavirus (COVID-19)? COVID-19 is a disease caused by a type of coronavirus. This illness was first found in December 2019. It has since spread worldwide. Coronaviruses are a large group of viruses. They cause the common cold. They also cause more serious illnesses like [place] respiratory syndrome (MERS) and severe acute respiratory syndrome (SARS). COVID-19 is caused by a novel coronavirus. That means it's a new type that has not been seen in people before. What are the symptoms? COVID-19 symptoms may include:  · Fever. · Cough. · Trouble breathing. · Chills or repeated shaking with chills. · Muscle and body aches. · Headache. · Sore throat. · New loss of taste or smell. · Vomiting. · Diarrhea. In severe cases, COVID-19 can cause pneumonia and make it hard to breathe without help from a machine. It can cause death. How is it diagnosed? COVID-19 is diagnosed with a viral test. This may also be called a PCR test or antigen test. It looks for evidence of the virus in your breathing passages or lungs (respiratory system). The test is most often done on a sample from the nose, throat, or lungs. It's sometimes done on a sample of saliva. One way a sample is collected is by putting a long swab into the back of your nose. How is it treated? Mild cases of COVID-19 can be treated at home. Serious cases need treatment in the hospital. Treatment may include medicines to reduce symptoms, plus breathing support such as oxygen therapy or a ventilator. Some people may be placed on their belly to help their oxygen levels. Treatments that may help people who have COVID-19 include:  Antiviral medicines. These medicines treat viral infections. Remdesivir is an example. Immune-based therapy. These medicines help the immune system fight COVID-19. Examples include monoclonal antibodies. Blood thinners.   These medicines help prevent blood clots. People with severe illness are at risk for blood clots. How can you protect yourself and others? · Get vaccinated. · Avoid sick people. · Cover your mouth with a tissue when you cough or sneeze. · Wash your hands often, especially after you cough or sneeze. Use soap and water, and scrub for at least 20 seconds. If soap and water aren't available, use an alcohol-based hand . · Avoid touching your mouth, nose, and eyes. Be sure to follow all instructions from the [facility] and your local health authorities. Here are some examples of specific precautions you may need to take. · If you are not fully vaccinated:  ? Wear a mask if you have to go to public areas. ? Avoid crowds and try to stay at least 6 feet away from other people. · If you have been exposed to the virus and are not fully vaccinated:  ? Stay home. Don't go to school, work, or public areas. And don't use public transportation, ride-shares, or taxis unless you have no choice. ? Wear a mask if you have to go to public areas, like the pharmacy. · Even if you're fully vaccinated, there's still a small chance you can get and spread COVID-19. If you live in an area where COVID-19 is spreading quickly, wear a mask if you have to go to indoor public areas. You might also want to wear a mask in crowded outdoor areas if you:  ? Have certain health conditions. ? Live with someone who has a compromised immune system. ? Live with someone who is not fully vaccinated. · If you have been exposed and you are fully vaccinated:  ? Talk to your doctor. You may need a COVID-19 test.  ? Wear a mask in public indoor spaces for 14 days or until you test negative for COVID-19. If you're sick:  · Leave your home only if you need to get medical care. But call the doctor's office first so they know you're coming. And wear a mask. · Wear a mask whenever you're around other people. · Limit contact with pets and people in your home.  If possible, stay in a separate bedroom and use a separate bathroom. · Clean and disinfect your home every day. Use household  and disinfectant wipes or sprays. Take special care to clean things that you touch with your hands. How can you self-isolate when you have COVID-19? If you have COVID-19, there are things you can do to help avoid spreading the virus to others. · Limit contact with people in your home. If possible, stay in a separate bedroom and use a separate bathroom. · Wear a mask when you are around other people. · If you have to leave home, avoid crowds and try to stay at least 6 feet away from other people. · Avoid contact with pets and other animals. · Cover your mouth and nose with a tissue when you cough or sneeze. Then throw it in the trash right away. · Wash your hands often, especially after you cough or sneeze. Use soap and water, and scrub for at least 20 seconds. If soap and water aren't available, use an alcohol-based hand . · Don't share personal household items. These include bedding, towels, cups and glasses, and eating utensils. · [address] in the warmest water allowed for the fabric type, and dry it completely. It's okay to wash other people's laundry with yours. · Clean and disinfect your home. Use household  and disinfectant wipes or sprays. When should you call for help? Call 911 anytime you think you may need emergency care. For example, call if you have life-threatening symptoms, such as:    · You have severe trouble breathing. (You can't talk at all.)     · You have constant chest pain or pressure.     · You are severely dizzy or lightheaded.     · You are confused or can't think clearly.     · You have pale, gray, or blue-colored skin or lips.     · You pass out (lose consciousness) or are very hard to wake up. Call your doctor now or seek immediate medical care if:    · You have moderate trouble breathing.  (You can't speak a full sentence.)     · You are coughing up blood (more than about 1 teaspoon).     · You have signs of low blood pressure. These include feeling lightheaded; being too weak to stand; and having cold, pale, clammy skin. Watch closely for changes in your health, and be sure to contact your doctor if:    · Your symptoms get worse.     · You are not getting better as expected.     · You have new or worse symptoms of anxiety, depression, nightmares, or flashbacks. Call before you go to the doctor's office. Follow their instructions. And wear a mask. Current as of: July 1, 2021               Content Version: 13.1  © 2006-2021 [employer]. Care instructions adapted under license by [facility] ([facility]). If you have questions about a medical condition or this instruction, always ask your healthcare professional. [address] any warranty or liability for your use of this information.

## 2022-01-27 NOTE — TELEPHONE ENCOUNTER
Pt called in wants to get tested for covid, pt was advised she needs order from PCP.  Can we put the order in?

## 2022-01-27 NOTE — PROGRESS NOTES
22  Jose Pino : 1990 Sex: female  Age 32 y.o. Subjective:  Chief Complaint   Patient presents with    Cough     sore throat, shortness of breath, right ear ache, headaches, head congestion for the last 2 days    Other     patient is not vaccinated for covid, her  tested positive this morning and has been sick for 4-5 days       HPI:   Jose Pino , 32 y.o. female presents to the clinic for evaluation of sinus congestion x 1 day. The patient also reports right ear discomfort, dry cough, sore throat, headache, and PATEL. The patient has taken Thera Flu for symptoms. The patient reports unchanged symptoms over time. The patient reports COVID-19 ill exposure (). The patient denies hx of COVID-19 and denies having the vaccines. The patient denies acute loss of taste and smell, sore throat, rash, and fever. The patient also denies chest pain, abdominal pain, and nausea / vomiting / diarrhea. ROS:   Unless otherwise stated in this report the patient's positive and negative responses for review of systems for constitutional, eyes, ENT, cardiovascular, respiratory, gastrointestinal, neurological, , musculoskeletal, and integument systems and related systems to the presenting problem are either stated in the history of present illness or were not pertinent or were negative for the symptoms and/or complaints related to the presenting medical problem. Positives and pertinent negatives as per HPI. All others reviewed and are negative.       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     Low back pain     MTHFR mutation     Oligohydramnios in third trimester 2021    POTS (postural orthostatic tachycardia syndrome)     Shingles     Thrombophilia during pregnancy in third trimester ([facility] [address] 2021    Thyroid disease        Past Surgical History:   Procedure Laterality Date     SECTION N/A 2021     SECTION performed by Stef[facility] Kraft MD at [facility] L[facility]D OR    DILATION AND CURETTAGE OF UTERUS      FRACTURE SURGERY      10    NERVE BLOCK Left 2018    transforaminal epidural #1    OH ZEHRA NOSE/CLEFT LIP/TIP Left 2018    LEFT L5-S1 TRANSFORAMINAL EPIDURAL STEROID INJECTION #1 performed by Jaspal Yen DO at [facility] Right        Family History   Problem Relation Age of Onset    Diabetes Father     Deep Vein Thrombosis Maternal Grandmother     Substance Abuse Brother     Stroke Paternal Uncle        Medications:     Current Outpatient Medications:     apixaban (ELIQUIS) 5 MG TABS tablet, Take 5 mg by mouth 2 times daily, Disp: , Rfl:     clonazePAM (KLONOPIN) 1 MG tablet, Take 1 mg by mouth 2 times daily as needed. , Disp: , Rfl:     amoxicillin-clavulanate (AUGMENTIN) 875-125 MG per tablet, Take 1 tablet by mouth 2 times daily for 10 days, Disp: 20 tablet, Rfl: 0    methylPREDNISolone (MEDROL DOSEPACK) 4 MG tablet, Take by mouth., Disp: 1 kit, Rfl: 0    brompheniramine-pseudoephedrine-DM (BROMFED DM) 2-30-10 MG/5ML syrup, Take 10 mLs by mouth 4 times daily as needed for Congestion or Cough, Disp: 240 mL, Rfl: 0    ibuprofen (ADVIL;MOTRIN) 800 MG tablet, Take 1 tablet by mouth every 6 hours as needed for Pain, Disp: 120 tablet, Rfl: 0    metoprolol succinate (TOPROL XL) 25 MG extended release tablet, Take 0.5 tablets by mouth nightly, Disp: 90 tablet, Rfl: 1    Handicap Placard MISC, by Does not apply route Expires in 1 year, Disp: 1 each, Rfl: 0   diclofenac sodium (VOLTAREN) 1 % GEL, Apply 2 g topically 4 times daily, Disp: 100 g, Rfl: 3    metFORMIN (GLUCOPHAGE) 1000 MG tablet, Take 2,000 mg by mouth nightly , Disp: , Rfl:     diphenhydrAMINE (BENADRYL) 25 MG tablet, Take 25 mg by mouth nightly as needed for Sleep, Disp: , Rfl:     aspirin 81 MG EC tablet, Take 81 mg by mouth 2 times daily, Disp: , Rfl:     Cholecalciferol (VITAMIN D3) 50 MCG (2000 UT) TABS, Take one tablet daily, Disp: 90 tablet, Rfl: 1    albuterol sulfate HFA (VENTOLIN HFA) 108 (90 Base) MCG/ACT inhaler, Inhale 2 puffs into the lungs every 6 hours as needed for Wheezing, Disp: 3 Inhaler, Rfl: 1    folic acid (FOLVITE) 1 MG tablet, TAKE 1 TABLET BY MOUTH ONCE DAILY (Patient taking differently: Take 3 mg by mouth daily ), Disp: 90 tablet, Rfl: 0    pyridostigmine (MESTINON) 60 MG tablet, Take 1 tablet by mouth 3 times daily, Disp: 270 tablet, Rfl: 1    citalopram (CELEXA) 20 MG tablet, Take 20 mg by mouth daily, Disp: , Rfl:     acetaminophen (TYLENOL) 500 MG tablet, Take 500 mg by mouth every 6 hours as needed , Disp: , Rfl:     enoxaparin (LOVENOX) 100 MG/ML injection, Inject 1 mL into the skin 2 times daily (Patient not taking: Reported on 2022), Disp: 60 each, Rfl: 1    docusate sodium (COLACE) 100 MG capsule, Take 1 capsule by mouth 2 times daily (Patient not taking: Reported on 2022), Disp: 60 capsule, Rfl: 1    Prenatal Vit-DSS-Fe Cbn-FA (PRENATAL AD PO), Take by mouth (Patient not taking: Reported on 2022), Disp: , Rfl:     Allergies:      Allergies   Allergen Reactions    Latex Dermatitis    Prednisone Other (See Comments)     Becomes aggressive per mother    Prozac [Fluoxetine Hcl] Other (See Comments)     Suicidal thoughts       Social History:     Social History     Tobacco Use    Smoking status: Former Smoker     Packs/day: 0.50     Types: Cigarettes     Start date:      Quit date: 2021     Years since quittin.6    Smokeless tobacco: Never Used   Vaping Use    Vaping Use: Former    Quit date: 7/17/2019   Substance Use Topics    Alcohol use: Not Currently    Drug use: No       Patient lives at home. Physical Exam:     Vitals:    01/27/22 1303   BP: 128/72   Pulse: 100   Resp: 18   Temp: 97.8 °F (36.6 °C)   TempSrc: Temporal   SpO2: 98%   Weight: 265 lb (120.2 kg)   Height: 5' 6\" (1.676 m)       Physical Exam (PE)    Physical Exam  Constitutional:       Appearance: Normal appearance. HENT:      Head: Normocephalic. Right Ear: Ear canal and external ear normal. A middle ear effusion is present. Tympanic membrane is injected. Left Ear: Tympanic membrane, ear canal and external ear normal.      Nose: Rhinorrhea present. Mouth/Throat:      Mouth: Mucous membranes are moist.      Pharynx: Oropharynx is clear. Eyes:      Pupils: Pupils are equal, round, and reactive to light. Cardiovascular:      Rate and Rhythm: Normal rate and regular rhythm. Pulses: Normal pulses. Heart sounds: Normal heart sounds. Pulmonary:      Effort: Pulmonary effort is normal.      Breath sounds: Examination of the right-lower field reveals decreased breath sounds. Examination of the left-lower field reveals decreased breath sounds. Decreased breath sounds present. No wheezing, rhonchi or rales. Abdominal:      General: Bowel sounds are normal.      Palpations: Abdomen is soft. Musculoskeletal:         General: Normal range of motion. Cervical back: Normal range of motion and neck supple. Lymphadenopathy:      Cervical: No cervical adenopathy. Skin:     General: Skin is warm and dry. Capillary Refill: Capillary refill takes less than 2 seconds. Neurological:      General: No focal deficit present. Mental Status: She is alert and oriented to person, place, and time.    Psychiatric:         Mood and Affect: Mood normal.         Behavior: Behavior normal.          Testing:   (All laboratory and radiology results have been personally reviewed by myself)  Labs:  Results for orders placed or performed in visit on 01/27/22   POCT COVID-19 Rapid, NAAT   Result Value Ref Range    SARS-COV-2, RdRp gene Positive (A) Negative    Lot Number [id number]     QC Pass/Fail Pass        Imaging: All Radiology results interpreted by Radiologist unless otherwise noted. No orders to display       Assessment / Plan:   The patient's vitals, allergies, medications, and past medical history have been reviewed. KATHY was seen today for cough and other. Diagnoses and all orders for this visit:    COVID-19  -     methylPREDNISolone (MEDROL DOSEPACK) 4 MG tablet; Take by mouth. -     brompheniramine-pseudoephedrine-DM (BROMFED DM) 2-30-10 MG/5ML syrup; Take 10 mLs by mouth 4 times daily as needed for Congestion or Cough    Non-recurrent acute suppurative otitis media of right ear without spontaneous rupture of tympanic membrane  -     amoxicillin-clavulanate (AUGMENTIN) 875-125 MG per tablet; Take 1 tablet by mouth 2 times daily for 10 days    Exposure to COVID-19 virus  -     POCT COVID-19 Rapid, NAAT    Other orders  -     Cancel: POCT rapid strep A        - Disposition: Home    - Educational material printed for patient's review and were included in patient instructions. After Visit Summary and given to patient at the end of visit. - Advised to follow CDC guidelines. Encouraged oral fluids and rest. Discussed symptomatic treatments with patient today including Albuterol inhaler as prescribed by PCP and Tylenol prn for fever / pain. Schedule a follow-up with PCP in 2-3 days. Red flag symptoms were discussed with the patient today. The patient is directed to go the ED if symptoms change or worsen. Pt verbalizes understanding and is in agreement with plan of care. All questions answered. SIGNATURE: ELADIO Curiel-CNP    *NOTE: This report was transcribed using voice recognition software.  Every effort was made to ensure accuracy; however, inadvertent computerized transcription errors may be present.

## 2022-02-16 ENCOUNTER — TELEMEDICINE (OUTPATIENT)
Dept: FAMILY MEDICINE CLINIC | Age: 32
End: 2022-02-16
Payer: MEDICARE

## 2022-02-16 DIAGNOSIS — I63.50 CEREBRAL ARTERY OCCLUSION WITH CEREBRAL INFARCTION (HCC): Chronic | ICD-10-CM

## 2022-02-16 DIAGNOSIS — D68.2 FACTOR V DEFICIENCY (HCC): ICD-10-CM

## 2022-02-16 DIAGNOSIS — R53.83 FATIGUE, UNSPECIFIED TYPE: ICD-10-CM

## 2022-02-16 DIAGNOSIS — F41.9 ANXIETY: ICD-10-CM

## 2022-02-16 DIAGNOSIS — J45.20 MILD INTERMITTENT ASTHMA WITHOUT COMPLICATION: ICD-10-CM

## 2022-02-16 DIAGNOSIS — E78.5 DYSLIPIDEMIA: ICD-10-CM

## 2022-02-16 DIAGNOSIS — E66.01 MORBID OBESITY (HCC): ICD-10-CM

## 2022-02-16 DIAGNOSIS — J12.82 PNEUMONIA DUE TO COVID-19 VIRUS: Primary | ICD-10-CM

## 2022-02-16 DIAGNOSIS — G90.A POTS (POSTURAL ORTHOSTATIC TACHYCARDIA SYNDROME): ICD-10-CM

## 2022-02-16 DIAGNOSIS — E06.3 HASHIMOTO'S THYROIDITIS: ICD-10-CM

## 2022-02-16 DIAGNOSIS — U07.1 PNEUMONIA DUE TO COVID-19 VIRUS: Primary | ICD-10-CM

## 2022-02-16 DIAGNOSIS — R73.01 IFG (IMPAIRED FASTING GLUCOSE): ICD-10-CM

## 2022-02-16 PROCEDURE — G8484 FLU IMMUNIZE NO ADMIN: HCPCS | Performed by: FAMILY MEDICINE

## 2022-02-16 PROCEDURE — G8417 CALC BMI ABV UP PARAM F/U: HCPCS | Performed by: FAMILY MEDICINE

## 2022-02-16 PROCEDURE — 99214 OFFICE O/P EST MOD 30 MIN: CPT | Performed by: FAMILY MEDICINE

## 2022-02-16 PROCEDURE — G8427 DOCREV CUR MEDS BY ELIG CLIN: HCPCS | Performed by: FAMILY MEDICINE

## 2022-02-16 PROCEDURE — 1036F TOBACCO NON-USER: CPT | Performed by: FAMILY MEDICINE

## 2022-02-16 RX ORDER — METOPROLOL SUCCINATE 25 MG/1
12.5 TABLET, EXTENDED RELEASE ORAL NIGHTLY
Qty: 90 TABLET | Refills: 1 | Status: SHIPPED
Start: 2022-02-16 | End: 2022-03-16 | Stop reason: SDUPTHER

## 2022-02-16 RX ORDER — GUAIFENESIN/DEXTROMETHORPHAN 100-10MG/5
10 SYRUP ORAL 3 TIMES DAILY PRN
Qty: 240 ML | Refills: 3 | Status: SHIPPED | OUTPATIENT
Start: 2022-02-16 | End: 2022-02-26

## 2022-02-16 RX ORDER — DILTIAZEM HYDROCHLORIDE 60 MG/1
2 TABLET, FILM COATED ORAL 2 TIMES DAILY
Qty: 10.2 G | Refills: 3
Start: 2022-02-16 | End: 2022-03-16 | Stop reason: SDUPTHER

## 2022-02-16 RX ORDER — FOLIC ACID 1 MG/1
TABLET ORAL
Qty: 90 TABLET | Refills: 0 | Status: SHIPPED
Start: 2022-02-16 | End: 2022-03-16 | Stop reason: SDUPTHER

## 2022-02-16 ASSESSMENT — ENCOUNTER SYMPTOMS
SINUS PAIN: 0
DIARRHEA: 0
CHOKING: 0
EYE DISCHARGE: 0
CONSTIPATION: 0
ANAL BLEEDING: 0
ALLERGIC/IMMUNOLOGIC NEGATIVE: 1
EYE ITCHING: 0
WHEEZING: 0
VISUAL CHANGE: 0
COLOR CHANGE: 0
ORTHOPNEA: 0
ABDOMINAL DISTENTION: 0
APNEA: 0
CHEST TIGHTNESS: 0
PHOTOPHOBIA: 0
RECTAL PAIN: 0
VOICE CHANGE: 0
BLURRED VISION: 0
GASTROINTESTINAL NEGATIVE: 1
BLOOD IN STOOL: 0
EYE REDNESS: 0
FACIAL SWELLING: 0
STRIDOR: 0
SHORTNESS OF BREATH: 1
TROUBLE SWALLOWING: 0
EYE PAIN: 0
RHINORRHEA: 0
SINUS PRESSURE: 0
BACK PAIN: 1

## 2022-02-16 NOTE — PROGRESS NOTES
Cynthia Delacruz is a 32 y.o. female. HPI/Chief C/O:  Chief Complaint   Patient presents with    Post-COVID Symptoms     Pt calling to follow up from having COVID x3 weeks ago, still has lingering cough    Discuss Medications     Pt wants to discuss Metformin, Lipitor, and Aspirin due to being post partum and post covid    Labs Only     Pt requesting lab orders    Other     Pt consents to the telemedicine visit and is currently in [address] [place] [address] Dr     Allergies   Allergen Reactions    Latex Dermatitis    Prozac [Fluoxetine Hcl] Other (See Comments)     Suicidal thoughts   TeleMedicine Video Visit    Cecilio Davis, was evaluated through a synchronous (real-time) audio-video encounter. The patient (or guardian if applicable) is aware that this is a billable service. , which includes applicable co-pays. This virtual visit was conducted with the patient's  (and/or legal guardian's) consent. The visit was conducted pursuant to the emergency declaration under the [address] [place] [address] authority and the [employer] and [employer] General Act. Patient identification was verified, and a caregiver was present when appropriate. The patient was located in a state where the provider was licensed to provide care. Patient identification was verified at the start of the visit, including the patient's telephone number and physical location. I discussed with the patient the nature of our telehealth visits, that:     Due to the nature of an audio- video modality, the only components of a physical exam that could be done are the elements supported by direct observation. I would evaluate the patient and recommend diagnostics and treatments based on my assessment. If it was felt that the patient should be evaluated in clinic or an emergency room setting, then they would be directed there.   Our sessions are not being recorded and that personal health information is protected. Our team would provide follow up care in person if/when the patient needs it. Patient's location: home address in [facility]  Physician  location home address in [facility] other people involved in call  Are none      On this date 2/16/2022 I have spent 30 minutes reviewing previous notes, test results and face to face (virtual) with the patient discussing the diagnosis and importance of compliance with the treatment plan as well as documenting on the day of the visit. ,     This visit was completed virtually using Doxy. me  The patient is here for a medication list and treatment planning review  We will go over our care planning goals as well as take care of all refills  We will set up labs as well   She is post covid           Diabetes  She presents for her follow-up diabetic visit. She has gestational diabetes mellitus. Hypoglycemia symptoms include nervousness/anxiousness. Pertinent negatives for hypoglycemia include no confusion, dizziness, headaches, pallor, seizures, speech difficulty, sweats or tremors. Associated symptoms include fatigue and weakness. Pertinent negatives for diabetes include no blurred vision, no chest pain, no foot paresthesias, no foot ulcerations, no polydipsia, no polyphagia, no polyuria, no visual change and no weight loss. There are no hypoglycemic complications. Diabetic complications include a CVA. Pertinent negatives for diabetic complications include no autonomic neuropathy, heart disease, nephropathy, peripheral neuropathy, PVD (H/O DVT lower extremity ) or retinopathy. Risk factors for coronary artery disease include obesity and diabetes mellitus. Current diabetic treatment includes diet and oral agent (monotherapy). She is compliant with treatment some of the time. An ACE inhibitor/angiotensin II receptor blocker is contraindicated. Hypertension  This is a chronic problem. The current episode started more than 1 year ago. The problem is controlled. Associated symptoms include anxiety, malaise/fatigue and shortness of breath. Pertinent negatives include no blurred vision, chest pain, headaches, neck pain, orthopnea, palpitations, peripheral edema, PND or sweats. Risk factors for coronary artery disease include family history. Past treatments include lifestyle changes and beta blockers. The current treatment provides significant improvement. Compliance problems include exercise, diet and psychosocial issues. Hypertensive end-organ damage includes CVA. There is no history of angina, kidney disease, CAD/MI (leiden factor V), heart failure, left ventricular hypertrophy, PVD (H/O DVT lower extremity ) or retinopathy. Identifiable causes of hypertension include a thyroid problem. There is no history of chronic renal disease, coarctation of the aorta, hyperaldosteronism, hypercortisolism, hyperparathyroidism, a hypertension causing med, pheochromocytoma, renovascular disease or sleep apnea. Back Pain  This is a chronic problem. The current episode started more than 1 month ago. The problem occurs constantly. The problem has been gradually worsening since onset. The pain is present in the lumbar spine. The quality of the pain is described as shooting and stabbing. The pain is at a severity of 8/10. The pain is moderate. The pain is the same all the time. Stiffness is present all day. Associated symptoms include leg pain and weakness. Pertinent negatives include no chest pain, dysuria, headaches, paresis, paresthesias, pelvic pain, perianal numbness or weight loss. ROS:  Review of Systems   Constitutional: Positive for fatigue and malaise/fatigue. Negative for activity change, appetite change, unexpected weight change and weight loss. HENT: Negative.   Negative for dental problem, drooling, ear discharge, ear pain, facial swelling, hearing loss, mouth sores, nosebleeds, postnasal drip, rhinorrhea, sinus pressure, sinus pain, sneezing, tinnitus, trouble swallowing and voice change. Eyes: Negative for blurred vision, photophobia, pain, discharge, redness, itching and visual disturbance. Respiratory: Positive for shortness of breath. Negative for apnea, choking, chest tightness, wheezing and stridor. Cardiovascular: Negative. Negative for chest pain, palpitations, orthopnea, leg swelling and PND. Gastrointestinal: Negative. Negative for abdominal distention, anal bleeding, blood in stool, constipation, diarrhea and rectal pain. Endocrine: Negative. Negative for cold intolerance, heat intolerance, polydipsia, polyphagia and polyuria. Genitourinary: Negative. Negative for decreased urine volume, difficulty urinating, dyspareunia, dysuria, enuresis, flank pain, frequency, genital sores, hematuria, menstrual problem, pelvic pain, urgency, vaginal bleeding, vaginal discharge and vaginal pain. Musculoskeletal: Positive for back pain and gait problem. Negative for neck pain and neck stiffness. Skin: Negative. Negative for color change, pallor and wound. Allergic/Immunologic: Negative. Negative for environmental allergies, food allergies and immunocompromised state. Neurological: Positive for weakness. Negative for dizziness, tremors, seizures, syncope, facial asymmetry, speech difficulty, light-headedness, headaches and paresthesias. Hematological: Negative for adenopathy. Bruises/bleeds easily. Psychiatric/Behavioral: Positive for decreased concentration and dysphoric mood. Negative for agitation, behavioral problems, confusion, hallucinations, self-injury, sleep disturbance and suicidal ideas. The patient is nervous/anxious. The patient is not hyperactive.          Past Medical/Surgical Hx;  Reviewed with patient      Diagnosis Date    Anemia     Anxiety     Asthma     Blood clot embolism during pregnancy, antepartum     Cerebrovascular accident (CVA) due to vascular occlusion ([facility]      delivery delivered 2021   Ranjan Kinsey Chronic migraine     Coagulopathy (HCC)     Diabetes mellitus ([facility]     DVT of leg (deep venous thrombosis) (HCC)     left    DVT of upper extremity (deep vein thrombosis) (HCC)     Left X 2    Heterozygous for BERT-1 4G allele     Low back pain     MTHFR mutation     Oligohydramnios in third trimester 2021    POTS (postural orthostatic tachycardia syndrome)     Shingles     Thrombophilia during pregnancy in third trimester ([facility] 2021    Thyroid disease      Past Surgical History:   Procedure Laterality Date     SECTION N/A 2021     SECTION [facility] at [facility]    DILATION AND CURETTAGE OF UTERUS      FRACTURE SURGERY      10    NERVE BLOCK Left 2018    transforaminal epidural #1    MO ZEHRA NOSE/CLEFT LIP/TIP Left 2018    LEFT L5-S1 TRANSFORAMINAL EPIDURAL STEROID INJECTION #1 performed by DO Blu at [facility] [address]        Past Family Hx:  Reviewed with patient      Problem Relation Age of Onset    Diabetes Father     Deep Vein Thrombosis Maternal Grandmother     Substance Abuse Brother     Stroke Paternal Uncle        Social Hx:  Reviewed with patient  Social History     Tobacco Use    Smoking status: Former Smoker     Packs/day: 0.50     Types: Cigarettes     Start date:      Quit date: 2021     Years since quittin.6    Smokeless tobacco: Never Used   Substance Use Topics    Alcohol use: Not Currently       OBJECTIVE  [facility] 2021     Problem List:  Eugene Pacheco does not have any pertinent problems on file. PHYS EX:  General: Awake/Alert/Oriented/No Acute Distress      ASSESSMENT/PLAN  Eugene Pacheco was seen today for post-covid symptoms, discuss medications, labs only and other. Diagnoses and all orders for this visit:    Pneumonia due to COVID-19 virus  -     Comprehensive Metabolic Panel;  Future  -     CBC with Auto Differential; Future  -     XR CHEST (2 VW); Future  Long talk on treatment and prevention  Literature is given       Cerebral artery occlusion with cerebral infarction ([facility]  -     folic acid (FOLVITE) 1 MG tablet; TAKE 1 TABLET BY MOUTH ONCE DAILY  -     Comprehensive Metabolic Panel; Future  -     CBC with Auto Differential; Future    ---VASCULAR PANEL  A) asa, plavix, aggrenox  B) ELIQUIS, pletal, tzd, statin  C) ace, FOLIC, folic, ccb  D) cannikinumab, fish oils     ---CARDIAC---ELIQUIS, ace, BETA, statin, hctz, ( ccb )    POTS (postural orthostatic tachycardia syndrome)  -     metoprolol succinate (TOPROL XL) 25 MG extended release tablet; Take 0.5 tablets by mouth nightly  -     Comprehensive Metabolic Panel; Future  -     CBC with Auto Differential; Future  --stable on current care planning  -- continue treatment as we are meeting goals       Morbid obesity ([facility]  -     Comprehensive Metabolic Panel; Future  -     CBC with Auto Differential; Future  --talk diet and weight loss    Factor V deficiency ([facility]  -     Comprehensive Metabolic Panel; Future  -     CBC with Auto Differential; Future  --stable on current care planning  -- continue treatment as we are meeting goals       Anxiety  -     Comprehensive Metabolic Panel; Future  -     CBC with Auto Differential; Future  Counseling is given for anxiety and depression   All questions were taken and answers are given        Hashimoto's thyroiditis  -     Comprehensive Metabolic Panel; Future  -     CBC with Auto Differential; Future  Long talk on treatment and prevention  Literature is given       Mild intermittent asthma without complication  -     Comprehensive Metabolic Panel; Future  -     CBC with Auto Differential; Future  --stable on current care planning  -- continue treatment as we are meeting goals       IFG (impaired fasting glucose)  -     Comprehensive Metabolic Panel;  Future  -     CBC with Auto Differential; Future  -     Hemoglobin A1C; Future  -     Microalbumin, Ur; Future    Dyslipidemia  -     Comprehensive Metabolic Panel; Future  -     Lipid Panel; Future  -     CBC with Auto Differential; Future  --Mediterranean diet, exercise, weight loss, vitamins    We have a long talk on cholesterol and importance of lowering it       Fatigue, unspecified type  -     TSH; Future  -     Uric Acid; Future  -     Comprehensive Metabolic Panel; Future  -     CBC with Auto Differential; Future    Other orders  -     SYMBICORT 80-4.5 MCG/ACT AERO; Inhale 2 puffs into the lungs 2 times daily  -     guaiFENesin-dextromethorphan (ROBITUSSIN DM) 100-10 MG/5ML syrup; Take 10 mLs by mouth 3 times daily as needed for Cough  -     metFORMIN (GLUCOPHAGE) 1000 MG tablet; Take 2 tablets by mouth nightly        Outpatient Encounter Medications as of 2/16/2022   Medication Sig Dispense Refill    SYMBICORT 80-4.5 MCG/ACT AERO Inhale 2 puffs into the lungs 2 times daily 10.2 g 3    guaiFENesin-dextromethorphan (ROBITUSSIN DM) 100-10 MG/5ML syrup Take 10 mLs by mouth 3 times daily as needed for Cough 419 mL 3    folic acid (FOLVITE) 1 MG tablet TAKE 1 TABLET BY MOUTH ONCE DAILY 90 tablet 0    metoprolol succinate (TOPROL XL) 25 MG extended release tablet Take 0.5 tablets by mouth nightly 90 tablet 1    metFORMIN (GLUCOPHAGE) 1000 MG tablet Take 2 tablets by mouth nightly 90 tablet 1    apixaban (ELIQUIS) 5 MG TABS tablet Take 5 mg by mouth 2 times daily      clonazePAM (KLONOPIN) 1 MG tablet Take 1 mg by mouth 2 times daily as needed.       ibuprofen (ADVIL;MOTRIN) 800 MG tablet Take 1 tablet by mouth every 6 hours as needed for Pain 120 tablet 0    Handicap Placard MISC by Does not apply route Expires in 1 year 1 each 0    diclofenac sodium (VOLTAREN) 1 % GEL Apply 2 g topically 4 times daily 100 g 3    diphenhydrAMINE (BENADRYL) 25 MG tablet Take 25 mg by mouth nightly as needed for Sleep      Cholecalciferol (VITAMIN D3) 50 MCG (2000 UT) TABS Take one tablet daily 90 tablet 1    albuterol sulfate HFA (VENTOLIN HFA) 108 (90 Base) MCG/ACT inhaler Inhale 2 puffs into the lungs every 6 hours as needed for Wheezing 3 Inhaler 1    pyridostigmine (MESTINON) 60 MG tablet Take 1 tablet by mouth 3 times daily 270 tablet 1    citalopram (CELEXA) 20 MG tablet Take 30 mg by mouth daily       acetaminophen (TYLENOL) 500 MG tablet Take 500 mg by mouth every 6 hours as needed       [DISCONTINUED] methylPREDNISolone (MEDROL DOSEPACK) 4 MG tablet Take by mouth. 1 kit 0    [DISCONTINUED] brompheniramine-pseudoephedrine-DM (BROMFED DM) 2-30-10 MG/5ML syrup Take 10 mLs by mouth 4 times daily as needed for Congestion or Cough 240 mL 0    [DISCONTINUED] enoxaparin (LOVENOX) 100 MG/ML injection Inject 1 mL into the skin 2 times daily (Patient not taking: Reported on 1/27/2022) 60 each 1    [DISCONTINUED] docusate sodium (COLACE) 100 MG capsule Take 1 capsule by mouth 2 times daily (Patient not taking: Reported on 1/27/2022) 60 capsule 1    [DISCONTINUED] metoprolol succinate (TOPROL XL) 25 MG extended release tablet Take 0.5 tablets by mouth nightly 90 tablet 1    [DISCONTINUED] metFORMIN (GLUCOPHAGE) 1000 MG tablet Take 2,000 mg by mouth nightly       [DISCONTINUED] Prenatal Vit-DSS-Fe Cbn-FA (PRENATAL AD PO) Take by mouth (Patient not taking: Reported on 1/27/2022)      [DISCONTINUED] aspirin 81 MG EC tablet Take 81 mg by mouth 2 times daily      [DISCONTINUED] folic acid (FOLVITE) 1 MG tablet TAKE 1 TABLET BY MOUTH ONCE DAILY (Patient taking differently: Take 3 mg by mouth daily ) 90 tablet 0     No facility-administered encounter medications on file as of 2/16/2022. No follow-ups on file.         Reviewed recent labs related to Gertrude's current problems      Discussed importance of regular Health Maintenance follow up  Health Maintenance   Topic    Varicella vaccine (1 of 2 - 2-dose childhood series)    COVID-19 Vaccine (1)    Pneumococcal 0-64 years Vaccine (1 of 2 - PPSV23)    HIV screen     Cervical cancer screen     Flu vaccine (1)    A1C test (Diabetic or Prediabetic)     Depression Screen     DTaP/Tdap/Td vaccine (2 - Td or Tdap)    Hepatitis C screen     Hepatitis A vaccine     Hepatitis B vaccine     Hib vaccine     Meningococcal (ACWY) vaccine

## 2022-03-16 ENCOUNTER — OFFICE VISIT (OUTPATIENT)
Dept: FAMILY MEDICINE CLINIC | Age: 32
End: 2022-03-16
Payer: COMMERCIAL

## 2022-03-16 VITALS
SYSTOLIC BLOOD PRESSURE: 128 MMHG | TEMPERATURE: 97.5 F | HEIGHT: 66 IN | DIASTOLIC BLOOD PRESSURE: 78 MMHG | OXYGEN SATURATION: 98 % | HEART RATE: 75 BPM | RESPIRATION RATE: 18 BRPM | WEIGHT: 275 LBS | BODY MASS INDEX: 44.2 KG/M2

## 2022-03-16 DIAGNOSIS — J45.20 MILD INTERMITTENT ASTHMA WITHOUT COMPLICATION: ICD-10-CM

## 2022-03-16 DIAGNOSIS — K58.0 IRRITABLE BOWEL SYNDROME WITH DIARRHEA: ICD-10-CM

## 2022-03-16 DIAGNOSIS — R73.01 IFG (IMPAIRED FASTING GLUCOSE): ICD-10-CM

## 2022-03-16 DIAGNOSIS — G90.A POTS (POSTURAL ORTHOSTATIC TACHYCARDIA SYNDROME): ICD-10-CM

## 2022-03-16 DIAGNOSIS — R05.9 COUGH: ICD-10-CM

## 2022-03-16 DIAGNOSIS — D68.2 FACTOR V DEFICIENCY (HCC): ICD-10-CM

## 2022-03-16 DIAGNOSIS — I63.50 CEREBRAL ARTERY OCCLUSION WITH CEREBRAL INFARCTION (HCC): Chronic | ICD-10-CM

## 2022-03-16 DIAGNOSIS — E11.9 TYPE 2 DIABETES MELLITUS WITHOUT COMPLICATION, WITHOUT LONG-TERM CURRENT USE OF INSULIN (HCC): Primary | ICD-10-CM

## 2022-03-16 DIAGNOSIS — E66.01 MORBID OBESITY (HCC): ICD-10-CM

## 2022-03-16 DIAGNOSIS — M54.16 LUMBAR RADICULOPATHY: ICD-10-CM

## 2022-03-16 DIAGNOSIS — R79.89 LOW VITAMIN D LEVEL: ICD-10-CM

## 2022-03-16 LAB — HBA1C MFR BLD: 5.4 %

## 2022-03-16 PROCEDURE — G8484 FLU IMMUNIZE NO ADMIN: HCPCS | Performed by: FAMILY MEDICINE

## 2022-03-16 PROCEDURE — 1036F TOBACCO NON-USER: CPT | Performed by: FAMILY MEDICINE

## 2022-03-16 PROCEDURE — 83036 HEMOGLOBIN GLYCOSYLATED A1C: CPT | Performed by: FAMILY MEDICINE

## 2022-03-16 PROCEDURE — G8427 DOCREV CUR MEDS BY ELIG CLIN: HCPCS | Performed by: FAMILY MEDICINE

## 2022-03-16 PROCEDURE — 2022F DILAT RTA XM EVC RTNOPTHY: CPT | Performed by: FAMILY MEDICINE

## 2022-03-16 PROCEDURE — G8417 CALC BMI ABV UP PARAM F/U: HCPCS | Performed by: FAMILY MEDICINE

## 2022-03-16 PROCEDURE — 99214 OFFICE O/P EST MOD 30 MIN: CPT | Performed by: FAMILY MEDICINE

## 2022-03-16 PROCEDURE — 3044F HG A1C LEVEL LT 7.0%: CPT | Performed by: FAMILY MEDICINE

## 2022-03-16 RX ORDER — DILTIAZEM HYDROCHLORIDE 60 MG/1
2 TABLET, FILM COATED ORAL 2 TIMES DAILY
Qty: 10.2 G | Refills: 3 | Status: SHIPPED
Start: 2022-03-16 | End: 2022-08-10

## 2022-03-16 RX ORDER — FOLIC ACID 1 MG/1
TABLET ORAL
Qty: 90 TABLET | Refills: 1 | Status: SHIPPED
Start: 2022-03-16 | End: 2022-08-22

## 2022-03-16 RX ORDER — METOPROLOL SUCCINATE 25 MG/1
12.5 TABLET, EXTENDED RELEASE ORAL NIGHTLY
Qty: 90 TABLET | Refills: 1 | Status: SHIPPED
Start: 2022-03-16 | End: 2022-10-19 | Stop reason: SDUPTHER

## 2022-03-16 RX ORDER — CHOLECALCIFEROL (VITAMIN D3) 125 MCG
CAPSULE ORAL
Qty: 90 TABLET | Refills: 1 | Status: SHIPPED
Start: 2022-03-16 | End: 2022-10-19

## 2022-03-16 RX ORDER — ALBUTEROL SULFATE 90 UG/1
2 AEROSOL, METERED RESPIRATORY (INHALATION) EVERY 6 HOURS PRN
Qty: 1 EACH | Refills: 5 | Status: SHIPPED | OUTPATIENT
Start: 2022-03-16

## 2022-03-16 ASSESSMENT — ENCOUNTER SYMPTOMS
VISUAL CHANGE: 0
EYE ITCHING: 0
EYE PAIN: 0
ABDOMINAL PAIN: 0
ABDOMINAL DISTENTION: 0
DIARRHEA: 1
EYE DISCHARGE: 0
ALLERGIC/IMMUNOLOGIC NEGATIVE: 1
NAUSEA: 0
ORTHOPNEA: 0
CHOKING: 0
VOMITING: 0
RECTAL PAIN: 0
FACIAL SWELLING: 0
SINUS PAIN: 0
BACK PAIN: 1
APNEA: 0
SORE THROAT: 0
BLURRED VISION: 0
SINUS PRESSURE: 0
CHEST TIGHTNESS: 0
VOICE CHANGE: 0
COUGH: 0
CONSTIPATION: 0
EYE REDNESS: 0
RHINORRHEA: 0
COLOR CHANGE: 0
STRIDOR: 0
SHORTNESS OF BREATH: 0
BLOOD IN STOOL: 0
WHEEZING: 0
ANAL BLEEDING: 0
TROUBLE SWALLOWING: 0
PHOTOPHOBIA: 0

## 2022-03-16 NOTE — PROGRESS NOTES
Angela Weaver is a 32 y.o. female. HPI/Chief C/O:  Chief Complaint   Patient presents with    Diabetes     A1C pended and done.  Medication Refill     pharmacy verified an meds pended. Allergies   Allergen Reactions    Latex Dermatitis    Prozac [Fluoxetine Hcl] Other (See Comments)     Suicidal thoughts   The patient is here for a medication list and treatment planning review  We will go over our care planning goals as well as take care of all refills  We will set up labs as well     Diabetes  She presents for her follow-up diabetic visit. She has gestational diabetes mellitus. Hypoglycemia symptoms include nervousness/anxiousness. Pertinent negatives for hypoglycemia include no confusion, dizziness, headaches, pallor, seizures, speech difficulty, sweats or tremors. Associated symptoms include fatigue. Pertinent negatives for diabetes include no blurred vision, no chest pain, no foot paresthesias, no foot ulcerations, no polydipsia, no polyphagia, no polyuria, no visual change, no weakness and no weight loss. There are no hypoglycemic complications. Diabetic complications include a CVA. Pertinent negatives for diabetic complications include no autonomic neuropathy, heart disease, nephropathy, peripheral neuropathy, PVD (H/O DVT lower extremity ) or retinopathy. Risk factors for coronary artery disease include obesity and diabetes mellitus. Current diabetic treatment includes diet and oral agent (monotherapy). She is compliant with treatment some of the time. An ACE inhibitor/angiotensin II receptor blocker is contraindicated. Hypertension  This is a chronic problem. The current episode started more than 1 year ago. The problem is controlled. Associated symptoms include anxiety and malaise/fatigue. Pertinent negatives include no blurred vision, chest pain, headaches, neck pain, orthopnea, palpitations, peripheral edema, PND, shortness of breath or sweats.  Risk factors for coronary artery disease include family history. Past treatments include lifestyle changes and beta blockers. The current treatment provides significant improvement. Compliance problems include exercise, diet and psychosocial issues. Hypertensive end-organ damage includes CVA. There is no history of angina, kidney disease, CAD/MI (leiden factor V), heart failure, left ventricular hypertrophy, PVD (H/O DVT lower extremity ) or retinopathy. Identifiable causes of hypertension include a thyroid problem. There is no history of chronic renal disease, coarctation of the aorta, hyperaldosteronism, hypercortisolism, hyperparathyroidism, a hypertension causing med, pheochromocytoma, renovascular disease or sleep apnea. Back Pain  This is a chronic problem. The current episode started more than 1 month ago. The problem occurs constantly. The problem has been gradually worsening since onset. The pain is present in the lumbar spine. The quality of the pain is described as shooting and stabbing. The pain is at a severity of 8/10. The pain is moderate. The pain is the same all the time. Stiffness is present all day. Associated symptoms include leg pain. Pertinent negatives include no abdominal pain, chest pain, dysuria, fever, headaches, numbness, paresis, paresthesias, pelvic pain, perianal numbness, weakness or weight loss. ROS:  Review of Systems   Constitutional: Positive for fatigue and malaise/fatigue. Negative for activity change, appetite change, chills, diaphoresis, fever, unexpected weight change and weight loss. HENT: Negative. Negative for congestion, dental problem, drooling, ear discharge, ear pain, facial swelling, hearing loss, mouth sores, nosebleeds, postnasal drip, rhinorrhea, sinus pressure, sinus pain, sneezing, sore throat, tinnitus, trouble swallowing and voice change. Eyes: Negative for blurred vision, photophobia, pain, discharge, redness, itching and visual disturbance.    Respiratory: Negative for apnea, cough, choking, chest tightness, shortness of breath, wheezing and stridor. Cardiovascular: Negative. Negative for chest pain, palpitations, orthopnea, leg swelling and PND. Gastrointestinal: Positive for diarrhea. Negative for abdominal distention, abdominal pain, anal bleeding, blood in stool, constipation, nausea, rectal pain and vomiting. Endocrine: Negative. Negative for cold intolerance, heat intolerance, polydipsia, polyphagia and polyuria. Genitourinary: Negative. Negative for decreased urine volume, difficulty urinating, dyspareunia, dysuria, enuresis, flank pain, frequency, genital sores, hematuria, menstrual problem, pelvic pain, urgency, vaginal bleeding, vaginal discharge and vaginal pain. Musculoskeletal: Positive for back pain and gait problem. Negative for arthralgias, joint swelling, myalgias, neck pain and neck stiffness. Skin: Negative. Negative for color change, pallor, rash and wound. Allergic/Immunologic: Negative. Negative for environmental allergies, food allergies and immunocompromised state. Neurological: Negative for dizziness, tremors, seizures, syncope, facial asymmetry, speech difficulty, weakness, light-headedness, numbness, headaches and paresthesias. Hematological: Negative for adenopathy. Bruises/bleeds easily. Psychiatric/Behavioral: Positive for decreased concentration and dysphoric mood. Negative for agitation, behavioral problems, confusion, hallucinations, self-injury, sleep disturbance and suicidal ideas. The patient is nervous/anxious. The patient is not hyperactive.          Past Medical/Surgical Hx;  Reviewed with patient      Diagnosis Date    Anemia     Anxiety     Asthma     Blood clot embolism during pregnancy, antepartum     Cerebrovascular accident (CVA) due to vascular occlusion ([facility] [address]      delivery delivered 2021    Chronic migraine     Coagulopathy (HCC)     Diabetes mellitus ([facility] [address]     DVT of leg (deep venous thrombosis) ([facility] [address]     left    DVT of upper extremity (deep vein thrombosis) (HCC)     Left X 2    Heterozygous for BERT-1 4G allele     Low back pain     MTHFR mutation     Oligohydramnios in third trimester 2021    POTS (postural orthostatic tachycardia syndrome)     Shingles     Thrombophilia during pregnancy in third trimester ([address] 2021    Thyroid disease      Past Surgical History:   Procedure Laterality Date     SECTION N/A 2021     SECTION performed [facility] Lacey [facility]ias [facility] L&D OR    DILATION AND CURETTAGE OF UTERUS      FRACTURE SURGERY      10    NERVE BLOCK Left 2018    transforaminal epidural #1    [place] ZEHRA NOSE/CLEFT LIP/TIP Left 2018    LEFT L5-S1 TRANSFORAMINAL EPIDURAL STEROID INJECTION #1 performed by Jessica Gaston DO at [facility] Right        Past Family Hx:  Reviewed with patient      Problem Relation Age of Onset    Diabetes Father     Deep Vein Thrombosis Maternal Grandmother     Substance Abuse Brother     Stroke Paternal Uncle        Social Hx:  Reviewed with patient  Social History     Tobacco Use    Smoking status: Former Smoker     Packs/day: 0.50     Types: Cigarettes     Start date:      Quit date: 2021     Years since quittin.7    Smokeless tobacco: Never Used   Substance Use Topics    Alcohol use: Not Currently       OBJECTIVE  /78   Pulse 75   Temp 97.5 °F (36.4 °C)   Resp 18   Ht 5' 6\" (1.676 m)   Wt 275 lb (124.7 kg)   LMP 2022   SpO2 98%   Breastfeeding No   BMI 44.39 kg/m²     Problem List:  [address] does not have any pertinent problems on file. PHYS EX:  Physical Exam  Vitals and nursing note reviewed. Constitutional:       General: She is not in acute distress. Appearance: Normal appearance. She is well-developed. She is obese. She is not ill-appearing, toxic-appearing or diaphoretic.    HENT:      Head: Normocephalic and atraumatic. Right Ear: External ear normal. There is no impacted cerumen. Left Ear: External ear normal. There is no impacted cerumen. Nose: Nose normal. No congestion or rhinorrhea. Mouth/Throat:      Mouth: Mucous membranes are moist.      Pharynx: No oropharyngeal exudate or posterior oropharyngeal erythema. Eyes:      General: No scleral icterus. Right eye: No discharge. Left eye: No discharge. Extraocular Movements: Extraocular movements intact. Conjunctiva/sclera: Conjunctivae normal.      Pupils: Pupils are equal, round, and reactive to light. Neck:      Thyroid: No thyromegaly. Vascular: No carotid bruit or JVD. Trachea: No tracheal deviation. Cardiovascular:      Rate and Rhythm: Normal rate and regular rhythm. Pulses: Normal pulses. Heart sounds: Normal heart sounds. No murmur heard. No friction rub. No gallop. Pulmonary:      Effort: Pulmonary effort is normal. No respiratory distress. Breath sounds: Normal breath sounds. No stridor. No wheezing, rhonchi or rales. Chest:      Chest wall: No tenderness. Abdominal:      General: Bowel sounds are normal. There is no distension. Palpations: Abdomen is soft. There is no mass. Tenderness: There is no abdominal tenderness. There is no right CVA tenderness, left CVA tenderness, guarding or rebound. Hernia: No hernia is present. Musculoskeletal:         General: Tenderness (lumbar pain) present. No swelling, deformity or signs of injury. Normal range of motion. Cervical back: Normal range of motion and neck supple. No rigidity. No muscular tenderness. Right lower leg: No edema. Left lower leg: No edema. Lymphadenopathy:      Cervical: No cervical adenopathy. Skin:     General: Skin is warm. Coloration: Skin is not jaundiced or pale. Findings: No bruising, erythema, lesion or rash. Neurological:      General: No focal deficit present. Mental Status: She is alert and oriented to person, place, and time. Cranial Nerves: No cranial nerve deficit. Sensory: Sensory deficit present. Motor: Weakness present. No abnormal muscle tone. Coordination: Coordination abnormal.      Gait: Gait abnormal.      Deep Tendon Reflexes: Reflexes abnormal.      Comments: Left foot drop          ASSESSMENT/PLAN  Seth Waldron was seen today for diabetes and medication refill. Diagnoses and all orders for this visit:    Type 2 diabetes mellitus without complication, without long-term current use of insulin ([facility])    ---VASCULAR PANEL  A) asa, plavix, aggrenox  B) ELIQUIS , pletal, tzd, statin  C) ace, hctz, FOLIC, ccb  D) cannikinumab, fish oils     ---CARDIAC---ELIQUIS, ace, BETA, statin, hctz, ( ccb )    POTS (postural orthostatic tachycardia syndrome)  -     metoprolol succinate (TOPROL XL) 25 MG extended release tablet; Take 0.5 tablets by mouth nightly  --stable on current care planning  -- continue treatment as we are meeting goals       Cerebral artery occlusion with cerebral infarction ([facility] [address]  -     folic acid (FOLVITE) 1 MG tablet; TAKE 1 TABLET BY MOUTH ONCE DAILY    Low vitamin D level  -     Cholecalciferol (VITAMIN D3) 50 MCG (2000 UT) TABS; Take one tablet daily    Cough  -     albuterol sulfate HFA (VENTOLIN HFA) 108 (90 Base) MCG/ACT inhaler;  Inhale 2 puffs into the lungs every 6 hours as needed for Wheezing    Irritable bowel syndrome with diarrhea  -     Eliazar Reyes DO, Gastroenterology[facility]uan A ([facility])    IFG (impaired fasting glucose)  -     POCT glycosylated hemoglobin (Hb A1C)    Factor V deficiency (HCC)  --stable on current care planning  -- continue treatment as we are meeting goals       Morbid obesity ([facility] [address]  --talk diet and weight loss    Mild intermittent asthma without complication  --PLAN--aerosol accuneb 1.25 plus chest percussion--Rx      Lumbar radiculopathy  --PLAN--inject right and left PSIS x 2                1/2 cc xylocaine plus 1 cc depo medrol                Omt/ultra--Rx        Other orders  -     SYMBICORT 80-4.5 MCG/ACT AERO; Inhale 2 puffs into the lungs 2 times daily  -     Discontinue: metFORMIN (GLUCOPHAGE) 1000 MG tablet; Take 2 tablets by mouth nightly  -     metFORMIN (GLUCOPHAGE) 1000 MG tablet; Take 1 tablet by mouth nightly        Outpatient Encounter Medications as of 3/16/2022   Medication Sig Dispense Refill    SYMBICORT 80-4.5 MCG/ACT AERO Inhale 2 puffs into the lungs 2 times daily 10.2 g 3    metoprolol succinate (TOPROL XL) 25 MG extended release tablet Take 0.5 tablets by mouth nightly 90 tablet 1    folic acid (FOLVITE) 1 MG tablet TAKE 1 TABLET BY MOUTH ONCE DAILY 90 tablet 1    Cholecalciferol (VITAMIN D3) 50 MCG (2000 UT) TABS Take one tablet daily 90 tablet 1    albuterol sulfate HFA (VENTOLIN HFA) 108 (90 Base) MCG/ACT inhaler Inhale 2 puffs into the lungs every 6 hours as needed for Wheezing 1 each 5    metFORMIN (GLUCOPHAGE) 1000 MG tablet Take 1 tablet by mouth nightly 180 tablet 1    apixaban (ELIQUIS) 5 MG TABS tablet Take 5 mg by mouth 2 times daily      clonazePAM (KLONOPIN) 1 MG tablet Take 1 mg by mouth 2 times daily as needed.       ibuprofen (ADVIL;MOTRIN) 800 MG tablet Take 1 tablet by mouth every 6 hours as needed for Pain 120 tablet 0    Handicap Placard MISC by Does not apply route Expires in 1 year 1 each 0    diclofenac sodium (VOLTAREN) 1 % GEL Apply 2 g topically 4 times daily 100 g 3    diphenhydrAMINE (BENADRYL) 25 MG tablet Take 25 mg by mouth nightly as needed for Sleep      pyridostigmine (MESTINON) 60 MG tablet Take 1 tablet by mouth 3 times daily 270 tablet 1    citalopram (CELEXA) 20 MG tablet Take 30 mg by mouth daily       acetaminophen (TYLENOL) 500 MG tablet Take 500 mg by mouth every 6 hours as needed       [DISCONTINUED] metFORMIN (GLUCOPHAGE) 1000 MG tablet Take 2 tablets by mouth nightly 180 tablet 1    [DISCONTINUED] SYMBICORT 80-4.5 MCG/ACT AERO Inhale 2 puffs into the lungs 2 times daily 10.2 g 3    [DISCONTINUED] folic acid (FOLVITE) 1 MG tablet TAKE 1 TABLET BY MOUTH ONCE DAILY 90 tablet 0    [DISCONTINUED] metoprolol succinate (TOPROL XL) 25 MG extended release tablet Take 0.5 tablets by mouth nightly 90 tablet 1    [DISCONTINUED] metFORMIN (GLUCOPHAGE) 1000 MG tablet Take 2 tablets by mouth nightly 90 tablet 1    [DISCONTINUED] Cholecalciferol (VITAMIN D3) 50 MCG (2000 UT) TABS Take one tablet daily 90 tablet 1    [DISCONTINUED] albuterol sulfate HFA (VENTOLIN HFA) 108 (90 Base) MCG/ACT inhaler Inhale 2 puffs into the lungs every 6 hours as needed for Wheezing 3 Inhaler 1     No facility-administered encounter medications on file as of 3/16/2022. No follow-ups on file.         Reviewed recent labs related to Gertrude's current problems      Discussed importance of regular Health Maintenance follow up  Health Maintenance   Topic    Varicella vaccine (1 of 2 - 2-dose childhood series)    COVID-19 Vaccine (1)    Pneumococcal 0-64 years Vaccine (1 of 2 - PPSV23)    HIV screen     Cervical cancer screen     Flu vaccine (1)    Depression Screen     A1C test (Diabetic or Prediabetic)     DTaP/Tdap/Td vaccine (2 - Td or Tdap)    Hepatitis C screen     Hepatitis A vaccine     Hepatitis B vaccine     Hib vaccine     Meningococcal (ACWY) vaccine

## 2022-03-16 NOTE — PATIENT INSTRUCTIONS
Patient Education        Diarrhea: Care Instructions  Overview     Diarrhea is loose, watery stools (bowel movements). The exact cause is often hard to find. Sometimes diarrhea is your body's way of getting rid of what caused an upset stomach. Viruses, food poisoning, and many medicines can cause diarrhea. Some people get diarrhea in response to emotional stress, anxiety, or certain foods. Almost everyone has diarrhea now and then. It usually isn't serious, and your stools will return to normal soon. The important thing to do is replace the fluids you have lost, so you can prevent dehydration. The doctor has checked you carefully, but problems can develop later. If you notice any problems or new symptoms, get medical treatment right away. Follow-up care is a key part of your treatment and safety. Be sure to make and go to all appointments, and call your doctor if you are having problems. It's also a good idea to know your test results and keep a list of the medicines you take. How can you care for yourself at home? · Watch for signs of dehydration, which means your body has lost too much water. Dehydration is a serious condition and should be treated right away. Signs of dehydration are:  ? Increasing thirst and dry eyes and mouth. ? Feeling faint or lightheaded. ? A smaller amount of urine than normal.  · To prevent dehydration, drink plenty of fluids. Choose water and other clear liquids until you feel better. If you have kidney, heart, or liver disease and have to limit fluids, talk with your doctor before you increase the amount of fluids you drink. · When you feel like eating, start with small amounts of food. · The doctor may recommend that you take over-the-counter medicine, such as loperamide (Imodium). Read and follow all instructions on the label. Do not use this medicine if you have bloody diarrhea, a high fever, or other signs of serious illness.  Call your doctor if you think you are having a

## 2022-03-28 ENCOUNTER — TELEMEDICINE (OUTPATIENT)
Dept: FAMILY MEDICINE CLINIC | Age: 32
End: 2022-03-28
Payer: COMMERCIAL

## 2022-03-28 ENCOUNTER — NURSE TRIAGE (OUTPATIENT)
Dept: OTHER | Facility: CLINIC | Age: 32
End: 2022-03-28

## 2022-03-28 DIAGNOSIS — D68.2 FACTOR V DEFICIENCY (HCC): ICD-10-CM

## 2022-03-28 DIAGNOSIS — K57.92 DIVERTICULITIS: ICD-10-CM

## 2022-03-28 DIAGNOSIS — E78.49 OTHER HYPERLIPIDEMIA: ICD-10-CM

## 2022-03-28 DIAGNOSIS — J45.20 MILD INTERMITTENT ASTHMA WITHOUT COMPLICATION: ICD-10-CM

## 2022-03-28 DIAGNOSIS — E11.9 TYPE 2 DIABETES MELLITUS WITHOUT COMPLICATION, WITHOUT LONG-TERM CURRENT USE OF INSULIN (HCC): Primary | ICD-10-CM

## 2022-03-28 DIAGNOSIS — E66.01 MORBID OBESITY (HCC): ICD-10-CM

## 2022-03-28 DIAGNOSIS — G90.A POTS (POSTURAL ORTHOSTATIC TACHYCARDIA SYNDROME): ICD-10-CM

## 2022-03-28 PROCEDURE — G8417 CALC BMI ABV UP PARAM F/U: HCPCS | Performed by: FAMILY MEDICINE

## 2022-03-28 PROCEDURE — 99214 OFFICE O/P EST MOD 30 MIN: CPT | Performed by: FAMILY MEDICINE

## 2022-03-28 PROCEDURE — G8484 FLU IMMUNIZE NO ADMIN: HCPCS | Performed by: FAMILY MEDICINE

## 2022-03-28 PROCEDURE — 1036F TOBACCO NON-USER: CPT | Performed by: FAMILY MEDICINE

## 2022-03-28 PROCEDURE — 3044F HG A1C LEVEL LT 7.0%: CPT | Performed by: FAMILY MEDICINE

## 2022-03-28 PROCEDURE — 2022F DILAT RTA XM EVC RTNOPTHY: CPT | Performed by: FAMILY MEDICINE

## 2022-03-28 PROCEDURE — G8427 DOCREV CUR MEDS BY ELIG CLIN: HCPCS | Performed by: FAMILY MEDICINE

## 2022-03-28 RX ORDER — AMOXICILLIN AND CLAVULANATE POTASSIUM 875; 125 MG/1; MG/1
1 TABLET, FILM COATED ORAL 2 TIMES DAILY
Qty: 20 TABLET | Refills: 0 | Status: SHIPPED | OUTPATIENT
Start: 2022-03-28 | End: 2022-04-07

## 2022-03-28 ASSESSMENT — LIFESTYLE VARIABLES
HOW OFTEN DO YOU HAVE A DRINK CONTAINING ALCOHOL: NEVER
HOW MANY STANDARD DRINKS CONTAINING ALCOHOL DO YOU HAVE ON A TYPICAL DAY: 1 OR 2

## 2022-03-28 ASSESSMENT — PATIENT HEALTH QUESTIONNAIRE - PHQ9
1. LITTLE INTEREST OR PLEASURE IN DOING THINGS: 0
SUM OF ALL RESPONSES TO PHQ QUESTIONS 1-9: 0
SUM OF ALL RESPONSES TO PHQ9 QUESTIONS 1 & 2: 0
2. FEELING DOWN, DEPRESSED OR HOPELESS: 0
SUM OF ALL RESPONSES TO PHQ QUESTIONS 1-9: 0

## 2022-03-28 ASSESSMENT — ENCOUNTER SYMPTOMS
APNEA: 0
BLURRED VISION: 0
EYE ITCHING: 0
FACIAL SWELLING: 0
CHOKING: 0
EYE DISCHARGE: 0
VOICE CHANGE: 0
SINUS PAIN: 0
BACK PAIN: 1
EYE REDNESS: 0
RHINORRHEA: 0
ORTHOPNEA: 0
BLOOD IN STOOL: 0
PHOTOPHOBIA: 0
ANAL BLEEDING: 0
TROUBLE SWALLOWING: 0
SHORTNESS OF BREATH: 0
CHEST TIGHTNESS: 0
ALLERGIC/IMMUNOLOGIC NEGATIVE: 1
WHEEZING: 0
ABDOMINAL DISTENTION: 0
EYE PAIN: 0
COLOR CHANGE: 0
SINUS PRESSURE: 0
STRIDOR: 0

## 2022-03-28 NOTE — TELEPHONE ENCOUNTER
Received call from BaptistDANY ADAIR at [facility] with [employer] Complaint. Subjective: Caller states \"last night around midnight, I had my baby 3 months ago i'm on my depo shot I thought I started my period but I wiped the back and it was a lot on the toilet paper, was bright red. Then it stopped. Slept in an adult diaper, had quarter size amount of blood in diaper this morning. Reports that she also had pain all the way up left side of abdomen that started that felt like menstrual cramps, turned into dull ache. Episode happened again this morning where she had blood on the toilet paper again. Saw PCP on , sent referral to GI for diarrhea but does not have appointment with them yet. Is on Eliquis. States that she was taking 800mg ibuprofen once a day up until 2-3 weeks ago for post  pain. Current Symptoms: Still having dull ache on left side of abdomen. Dull rectal pain. Nausea. One watery BM that looked darker than normal.     Onset: 9 hour ago; sudden    Associated Symptoms: diarrhea    Pain Severity: 2/10; dull; intermittent    Temperature: Denies NA    What has been tried: NA    LMP: 3 months PP, on depo shot has not had period since Pregnant: No    Recommended disposition: Go to ED Now    Care advice provided, patient verbalizes understanding; denies any other questions or concerns; instructed to call back for any new or worsening symptoms. Patient not agreeable with being seen in ED. States she will go there if it worsens but prefers to be seen by PCP at this time. Reiterated my disposition to the patient and expressed concern for the patient's well-being. Transferred directly to [facility] for further assistance. Attention Provider: Thank you for allowing me to participate in the care of your patient. The patient was connected to triage in response to information provided to the ECC/PSC.   Please do not respond through this encounter as the response is not directed to a shared pool.         Reason for Disposition   High-risk adult (e.g., prior surgery on aorta, abdominal aortic aneurysm)    Protocols used: RECTAL BLEEDING-ADULT-[place]

## 2022-03-28 NOTE — PROGRESS NOTES
Javid Tucker is a 32 y.o. female. HPI/Chief C/O:  Chief Complaint   Patient presents with    Rectal Bleeding     Bright red blood, denies dark/tarry stool    Abdominal Pain     LLQ abdominal pain, at times shoots up her side    Nausea     Denies vomiting    Diarrhea     Very soon after eating    Fatigue     Worse than usual     Other     Pt consents to the telemedicine visit and [place] in [place]     Allergies   Allergen Reactions    Latex Dermatitis    Prozac [Fluoxetine Hcl] Other (See Comments)     Suicidal thoughts   TeleMedicine Video Visit    Shahida Pereira, was evaluated through a synchronous (real-time) audio-video encounter. The patient (or guardian if applicable) is aware that this is a billable service. , which includes applicable co-pays. This virtual visit was conducted with the patient's  (and/or legal guardian's) consent. The visit was conducted pursuant to the emergency declaration under the [address] [place] [address], [address] [place] [address] authority and the [employer] General Act. Patient identification was verified, and a caregiver was present when appropriate. The patient was located in a state where the provider was licensed to provide care. Patient identification was verified at the start of the visit, including the patient's telephone number and physical location. I discussed with the patient the nature of our telehealth visits, that:     Due to the nature of an audio- video modality, the only components of a physical exam that could be done are the elements supported by direct observation. I would evaluate the patient and recommend diagnostics and treatments based on my assessment. If it was felt that the patient should be evaluated in clinic or an emergency room setting, then they would be directed there. Our sessions are not being recorded and that personal health information is protected.   Our team would provide follow up care in person if/when the patient needs it. Patient's location: home address in [facility]  Physician  location home address in [facility] other people involved in call  Are none      On this date 3/28/2022 I have spent 30  minutes reviewing previous notes, test results and face to face (virtual) with the patient discussing the diagnosis and importance of compliance with the treatment plan as well as documenting on the day of the visit. ,     This visit was completed virtually using Doxy. me  The patient is here for a medication list and treatment planning review  We will go over our care planning goals as well as take care of all refills  We will set up labs as well   C/O abdominal pain and bleeding from rectum           Diabetes  She presents for her follow-up diabetic visit. She has gestational diabetes mellitus. Hypoglycemia symptoms include nervousness/anxiousness. Pertinent negatives for hypoglycemia include no confusion, dizziness, headaches, pallor, seizures, speech difficulty, sweats or tremors. Associated symptoms include fatigue and weakness. Pertinent negatives for diabetes include no blurred vision, no chest pain, no foot paresthesias, no foot ulcerations, no polydipsia, no polyphagia and no polyuria. There are no hypoglycemic complications. Diabetic complications include a CVA. Pertinent negatives for diabetic complications include no autonomic neuropathy, heart disease, nephropathy, peripheral neuropathy, PVD (H/O DVT lower extremity ) or retinopathy. Risk factors for coronary artery disease include obesity and diabetes mellitus. Current diabetic treatment includes diet and oral agent (monotherapy). She is compliant with treatment some of the time. An ACE inhibitor/angiotensin II receptor blocker is contraindicated. Hypertension  This is a chronic problem. The current episode started more than 1 year ago. The problem is controlled.  Associated symptoms include anxiety and malaise/fatigue. Pertinent negatives include no blurred vision, chest pain, headaches, neck pain, orthopnea, palpitations, peripheral edema, PND, shortness of breath or sweats. Risk factors for coronary artery disease include family history. Past treatments include lifestyle changes and beta blockers. The current treatment provides significant improvement. Compliance problems include exercise, diet and psychosocial issues. Hypertensive end-organ damage includes CVA. There is no history of angina, kidney disease, CAD/MI (leiden factor V), heart failure, left ventricular hypertrophy, PVD (H/O DVT lower extremity ) or retinopathy. Identifiable causes of hypertension include a thyroid problem. There is no history of chronic renal disease, coarctation of the aorta, hyperaldosteronism, hypercortisolism, hyperparathyroidism, a hypertension causing med, pheochromocytoma, renovascular disease or sleep apnea. Back Pain  This is a chronic problem. The current episode started more than 1 month ago. The problem occurs constantly. The problem has been gradually worsening since onset. The pain is present in the lumbar spine. The quality of the pain is described as shooting and stabbing. The pain is at a severity of 8/10. The pain is moderate. The pain is the same all the time. Stiffness is present all day. Associated symptoms include leg pain and weakness. Pertinent negatives include no chest pain, headaches, paresis, paresthesias, pelvic pain or perianal numbness. ROS:  Review of Systems   Constitutional: Positive for fatigue and malaise/fatigue. Negative for activity change, appetite change and unexpected weight change. HENT: Negative. Negative for dental problem, drooling, ear discharge, ear pain, facial swelling, hearing loss, mouth sores, nosebleeds, postnasal drip, rhinorrhea, sinus pressure, sinus pain, sneezing, tinnitus, trouble swallowing and voice change.     Eyes: Negative for blurred vision, photophobia, pain, discharge, redness, itching and visual disturbance. Respiratory: Negative for apnea, choking, chest tightness, shortness of breath, wheezing and stridor. Cardiovascular: Negative. Negative for chest pain, palpitations, orthopnea, leg swelling and PND. Gastrointestinal: Negative for abdominal distention, anal bleeding and blood in stool. Endocrine: Negative. Negative for cold intolerance, heat intolerance, polydipsia, polyphagia and polyuria. Genitourinary: Negative. Negative for decreased urine volume, difficulty urinating, dyspareunia, enuresis, flank pain, genital sores, menstrual problem, pelvic pain, urgency and vaginal pain. Musculoskeletal: Positive for back pain and gait problem. Negative for neck pain and neck stiffness. Skin: Negative. Negative for color change, pallor and wound. Allergic/Immunologic: Negative. Negative for environmental allergies, food allergies and immunocompromised state. Neurological: Positive for weakness. Negative for dizziness, tremors, seizures, syncope, facial asymmetry, speech difficulty, light-headedness, headaches and paresthesias. Hematological: Negative for adenopathy. Bruises/bleeds easily. Psychiatric/Behavioral: Positive for decreased concentration and dysphoric mood. Negative for agitation, behavioral problems, confusion, hallucinations, self-injury, sleep disturbance and suicidal ideas. The patient is nervous/anxious. The patient is not hyperactive.          Past Medical/Surgical Hx;  Reviewed with patient      Diagnosis Date    Anemia     Anxiety     Asthma     Blood clot embolism during pregnancy, antepartum     Cerebrovascular accident (CVA) due to vascular occlusion ([address]      delivery delivered 2021    Chronic migraine     Coagulopathy (HCC)     Diabetes mellitus ([address]     DVT of leg (deep venous thrombosis) ([address]     left    DVT of upper extremity (deep vein thrombosis) ([address]     Left X 2    Heterozygous for BERT-1 4G allele     Low back pain     MTHFR mutation     Oligohydramnios in third trimester 2021    POTS (postural orthostatic tachycardia syndrome)     Shingles     Thrombophilia during pregnancy in third trimester ([facility] 2021    Thyroid disease      Past Surgical History:   Procedure Laterality Date     SECTION N/A 2021     SECTION [facility] at [facility] L[facility]D [facility]    DILATION AND CURETTAGE OF UTERUS      FRACTURE SURGERY      10    NERVE BLOCK Left 2018    transforaminal epidural #1    MS ZEHRA NOSE/CLEFT LIP/TIP Left 2018    LEFT L5-S1 TRANSFORAMINAL EPIDURAL STEROID INJECTION #1 performed by Jessica Gaston DO at [facility] [address] Right        Past Family Hx:  Reviewed with patient      Problem Relation Age of Onset    Diabetes Father     Deep Vein Thrombosis Maternal Grandmother     Substance Abuse Brother     Stroke Paternal Uncle        Social Hx:  Reviewed with patient  Social History     Tobacco Use    Smoking status: Former Smoker     Packs/day: 0.50     Types: Cigarettes     Start date:      Quit date: 2021     Years since quittin.7    Smokeless tobacco: Never Used   Substance Use Topics    Alcohol use: Not Currently       OBJECTIVE  There were no vitals taken for this visit. Problem List:  Atruro Salcedo does not have any pertinent problems on file. PHYS EX:  General: Awake/Alert/Oriented/No Acute Distress      ASSESSMENT/PLAN  Arturo Salcedo was seen today for rectal bleeding, abdominal pain, nausea, diarrhea, fatigue and other.     Diagnoses and all orders for this visit:    Type 2 diabetes mellitus without complication, without long-term current use of insulin ([facility])    ---VASCULAR PANEL  A) asa, plavix, aggrenox  B) ELIQUIS, pletal, tzd, statin  C) ace, hctz, FLOIC, ccb  D) cannikinumab, fish oils     ---CARDIAC---ELIQUIS, ace, BETA, statin, hctz, ( ccb )    Morbid obesity ([facility]  --talk diet and weight loss    Factor V deficiency ([facility]  Long talk on treatment and prevention  Literature is given       Mild intermittent asthma without complication  --stable on current care planning  -- continue treatment as we are meeting goals       Other hyperlipidemia  --Mediterranean diet, exercise, weight loss, vitamins    We have a long talk on cholesterol and importance of lowering it       POTS (postural orthostatic tachycardia syndrome)  --stable on current care planning  -- continue treatment as we are meeting goals       Other orders  -     hydrocortisone 2.5 % cream; Apply topically 2 times daily. SHE IS TO SEE GI FOR THIS RECTAL BLEEDING AND PAIN. IF WORSE, GO RIGHT TO ER  R/O diverticulitis     Outpatient Encounter Medications as of 3/28/2022   Medication Sig Dispense Refill    hydrocortisone 2.5 % cream Apply topically 2 times daily. 1 each 3    SYMBICORT 80-4.5 MCG/ACT AERO Inhale 2 puffs into the lungs 2 times daily 10.2 g 3    metoprolol succinate (TOPROL XL) 25 MG extended release tablet Take 0.5 tablets by mouth nightly 90 tablet 1    folic acid (FOLVITE) 1 MG tablet TAKE 1 TABLET BY MOUTH ONCE DAILY 90 tablet 1    Cholecalciferol (VITAMIN D3) 50 MCG (2000 UT) TABS Take one tablet daily 90 tablet 1    albuterol sulfate HFA (VENTOLIN HFA) 108 (90 Base) MCG/ACT inhaler Inhale 2 puffs into the lungs every 6 hours as needed for Wheezing 1 each 5    metFORMIN (GLUCOPHAGE) 1000 MG tablet Take 1 tablet by mouth nightly 180 tablet 1    apixaban (ELIQUIS) 5 MG TABS tablet Take 5 mg by mouth 2 times daily      clonazePAM (KLONOPIN) 1 MG tablet Take 1 mg by mouth 2 times daily as needed.       Handicap Placard MISC by Does not apply route Expires in 1 year 1 each 0    diclofenac sodium (VOLTAREN) 1 % GEL Apply 2 g topically 4 times daily 100 g 3    diphenhydrAMINE (BENADRYL) 25 MG tablet Take 25 mg by mouth nightly as needed for Sleep      pyridostigmine (MESTINON) 60 MG tablet Take 1 tablet by mouth 3 times daily 270 tablet 1    citalopram (CELEXA) 20 MG tablet Take 30 mg by mouth daily       acetaminophen (TYLENOL) 500 MG tablet Take 500 mg by mouth every 6 hours as needed       [DISCONTINUED] ibuprofen (ADVIL;MOTRIN) 800 MG tablet Take 1 tablet by mouth every 6 hours as needed for Pain 120 tablet 0     No facility-administered encounter medications on file as of 3/28/2022. No follow-ups on file.         Reviewed recent labs related to Gertrude's current problems      Discussed importance of regular Health Maintenance follow up  Health Maintenance   Topic    Varicella vaccine (1 of 2 - 2-dose childhood series)    COVID-19 Vaccine (1)    Pneumococcal 0-64 years Vaccine (1 of 2 - PPSV23)    Diabetic foot exam     HIV screen     Diabetic microalbuminuria test     Diabetic retinal exam     Hepatitis B vaccine (1 of 3 - Risk 3-dose series)    Cervical cancer screen     Flu vaccine (1)    Lipid screen     Depression Screen     A1C test (Diabetic or Prediabetic)     DTaP/Tdap/Td vaccine (2 - Td or Tdap)    Hepatitis C screen     Hepatitis A vaccine     Hib vaccine     Meningococcal (ACWY) vaccine

## 2022-03-29 ENCOUNTER — HOSPITAL ENCOUNTER (OUTPATIENT)
Age: 32
Discharge: HOME | End: 2022-03-29
Payer: COMMERCIAL

## 2022-03-29 DIAGNOSIS — D68.2 FACTOR V DEFICIENCY (HCC): ICD-10-CM

## 2022-03-29 DIAGNOSIS — U07.1 PNEUMONIA DUE TO COVID-19 VIRUS: ICD-10-CM

## 2022-03-29 DIAGNOSIS — R53.83 FATIGUE, UNSPECIFIED TYPE: ICD-10-CM

## 2022-03-29 DIAGNOSIS — J12.82 PNEUMONIA DUE TO COVID-19 VIRUS: ICD-10-CM

## 2022-03-29 DIAGNOSIS — E66.01 MORBID OBESITY (HCC): ICD-10-CM

## 2022-03-29 DIAGNOSIS — R73.01 IFG (IMPAIRED FASTING GLUCOSE): ICD-10-CM

## 2022-03-29 DIAGNOSIS — I63.50 CEREBRAL ARTERY OCCLUSION WITH CEREBRAL INFARCTION (HCC): Chronic | ICD-10-CM

## 2022-03-29 DIAGNOSIS — J45.20 MILD INTERMITTENT ASTHMA WITHOUT COMPLICATION: ICD-10-CM

## 2022-03-29 DIAGNOSIS — E06.3 HASHIMOTO'S THYROIDITIS: ICD-10-CM

## 2022-03-29 DIAGNOSIS — E78.5 DYSLIPIDEMIA: ICD-10-CM

## 2022-03-29 DIAGNOSIS — G90.A POTS (POSTURAL ORTHOSTATIC TACHYCARDIA SYNDROME): ICD-10-CM

## 2022-03-29 DIAGNOSIS — F41.9 ANXIETY: ICD-10-CM

## 2022-03-29 LAB
ALBUMIN SERPL-MCNC: 4.2 G/DL (ref 3.5–5.2)
ALP BLD-CCNC: 78 U/L (ref 35–104)
ALT SERPL-CCNC: 44 U/L (ref 0–32)
ANION GAP SERPL CALCULATED.3IONS-SCNC: 10 MMOL/L (ref 7–16)
AST SERPL-CCNC: 27 U/L (ref 0–31)
BASOPHILS ABSOLUTE: 0.06 E9/L (ref 0–0.2)
BASOPHILS RELATIVE PERCENT: 0.8 % (ref 0–2)
BILIRUB SERPL-MCNC: 0.3 MG/DL (ref 0–1.2)
BUN BLDV-MCNC: 10 MG/DL (ref 6–20)
CALCIUM SERPL-MCNC: 9.7 MG/DL (ref 8.6–10.2)
CHLORIDE BLD-SCNC: 103 MMOL/L (ref 98–107)
CHOLESTEROL, TOTAL: 285 MG/DL (ref 0–199)
CO2: 22 MMOL/L (ref 22–29)
CREAT SERPL-MCNC: 0.8 MG/DL (ref 0.5–1)
EOSINOPHILS ABSOLUTE: 0.24 E9/L (ref 0.05–0.5)
EOSINOPHILS RELATIVE PERCENT: 3.4 % (ref 0–6)
GFR AFRICAN AMERICAN: >60
GFR NON-AFRICAN AMERICAN: >60 ML/MIN/1.73
GLUCOSE BLD-MCNC: 109 MG/DL (ref 74–99)
HBA1C MFR BLD: 5.2 % (ref 4–5.6)
HCT VFR BLD CALC: 38.4 % (ref 34–48)
HDLC SERPL-MCNC: 40 MG/DL
HEMOGLOBIN: 12.6 G/DL (ref 11.5–15.5)
IMMATURE GRANULOCYTES #: 0.03 E9/L
IMMATURE GRANULOCYTES %: 0.4 % (ref 0–5)
LDL CHOLESTEROL CALCULATED: ABNORMAL MG/DL (ref 0–99)
LYMPHOCYTES ABSOLUTE: 1.76 E9/L (ref 1.5–4)
LYMPHOCYTES RELATIVE PERCENT: 24.9 % (ref 20–42)
MCH RBC QN AUTO: 29 PG (ref 26–35)
MCHC RBC AUTO-ENTMCNC: 32.8 % (ref 32–34.5)
MCV RBC AUTO: 88.5 FL (ref 80–99.9)
MICROALBUMIN UR-MCNC: <12 MG/L
MONOCYTES ABSOLUTE: 0.61 E9/L (ref 0.1–0.95)
MONOCYTES RELATIVE PERCENT: 8.6 % (ref 2–12)
NEUTROPHILS ABSOLUTE: 4.38 E9/L (ref 1.8–7.3)
NEUTROPHILS RELATIVE PERCENT: 61.9 % (ref 43–80)
PDW BLD-RTO: 14.4 FL (ref 11.5–15)
PLATELET # BLD: 389 E9/L (ref 130–450)
PMV BLD AUTO: 9 FL (ref 7–12)
POTASSIUM SERPL-SCNC: 4.4 MMOL/L (ref 3.5–5)
RBC # BLD: 4.34 E12/L (ref 3.5–5.5)
SODIUM BLD-SCNC: 135 MMOL/L (ref 132–146)
TOTAL PROTEIN: 8 G/DL (ref 6.4–8.3)
TRIGL SERPL-MCNC: 537 MG/DL (ref 0–149)
TSH SERPL DL<=0.05 MIU/L-ACNC: 1.46 UIU/ML (ref 0.27–4.2)
URIC ACID, SERUM: 7 MG/DL (ref 2.4–5.7)
VLDLC SERPL CALC-MCNC: ABNORMAL MG/DL
WBC # BLD: 7.1 E9/L (ref 4.5–11.5)

## 2022-03-29 PROCEDURE — 85025 COMPLETE CBC W/AUTO DIFF WBC: CPT

## 2022-03-29 PROCEDURE — 84443 ASSAY THYROID STIM HORMONE: CPT

## 2022-03-29 PROCEDURE — 84550 ASSAY OF BLOOD/URIC ACID: CPT

## 2022-03-29 PROCEDURE — 80061 LIPID PANEL: CPT

## 2022-03-29 PROCEDURE — 83036 HEMOGLOBIN GLYCOSYLATED A1C: CPT

## 2022-03-29 PROCEDURE — 80053 COMPREHEN METABOLIC PANEL: CPT

## 2022-03-29 PROCEDURE — 82044 UR ALBUMIN SEMIQUANTITATIVE: CPT

## 2022-03-29 PROCEDURE — 36415 COLL VENOUS BLD VENIPUNCTURE: CPT

## 2022-04-07 ENCOUNTER — OFFICE VISIT (OUTPATIENT)
Dept: NEUROLOGY | Age: 32
End: 2022-04-07
Payer: COMMERCIAL

## 2022-04-07 VITALS
OXYGEN SATURATION: 97 % | TEMPERATURE: 97.9 F | HEART RATE: 91 BPM | WEIGHT: 278 LBS | SYSTOLIC BLOOD PRESSURE: 108 MMHG | DIASTOLIC BLOOD PRESSURE: 69 MMHG | BODY MASS INDEX: 44.87 KG/M2

## 2022-04-07 DIAGNOSIS — M79.662 PAIN OF LEFT CALF: Primary | ICD-10-CM

## 2022-04-07 DIAGNOSIS — Z86.718 HISTORY OF DVT OF LOWER EXTREMITY: ICD-10-CM

## 2022-04-07 PROCEDURE — 1036F TOBACCO NON-USER: CPT | Performed by: NURSE PRACTITIONER

## 2022-04-07 PROCEDURE — G8427 DOCREV CUR MEDS BY ELIG CLIN: HCPCS | Performed by: NURSE PRACTITIONER

## 2022-04-07 PROCEDURE — 99215 OFFICE O/P EST HI 40 MIN: CPT | Performed by: NURSE PRACTITIONER

## 2022-04-07 PROCEDURE — G8417 CALC BMI ABV UP PARAM F/U: HCPCS | Performed by: NURSE PRACTITIONER

## 2022-04-07 RX ORDER — GABAPENTIN 100 MG/1
200 CAPSULE ORAL NIGHTLY PRN
Qty: 360 CAPSULE | Refills: 0 | Status: SHIPPED
Start: 2022-04-07 | End: 2022-09-23

## 2022-04-07 RX ORDER — GALCANEZUMAB 120 MG/ML
INJECTION, SOLUTION SUBCUTANEOUS
Qty: 4 PEN | Refills: 0 | COMMUNITY
Start: 2022-04-07 | End: 2022-04-13

## 2022-04-07 RX ORDER — GALCANEZUMAB 120 MG/ML
120 INJECTION, SOLUTION SUBCUTANEOUS
Qty: 3 PEN | Refills: 3 | Status: SHIPPED
Start: 2022-04-07 | End: 2022-08-10

## 2022-04-07 RX ORDER — ATORVASTATIN CALCIUM 20 MG/1
20 TABLET, FILM COATED ORAL NIGHTLY
Qty: 90 TABLET | Refills: 3 | Status: SHIPPED
Start: 2022-04-07 | End: 2022-04-13

## 2022-04-07 NOTE — PROGRESS NOTES
[facility]. Kevin Guzman M.D., F.A.C.P. Duyen Whitney, FRANCISCO, APRN, CNS  Francisca Akers. Elias Casanova, MSN, APRN-FNP-C  Dahlia Sal MSN, APRN, FNP-C  [facility] [address] MSN, APRN, FNP-C  [address], [facility] [place]  [place], [address]  Phone: [phone]  Fax: [phone]         Niyah Orta is a 32 y.o. female    We are following her for stroke and migraines    She presents alone is a good historian    She has a 1month-old infant now and is not breast-feeding. She is interested in restarting all of her therapies which were on hold while pregnant. Her migraines came back \"raging\" and she is having over 15 headache days a month. She previously responded well to Emgality injections, with Nurtec for rescue, and would like these medications restarted. She is currently on metoprolol and an SSRI as well. Migraine medications failed: Topiramate; triptans are contraindicated with her history of stroke and her use of SSRIs. She had a very complicated pregnancy and was taken off of aspirin due to thrombophilia. She remains on anticoagulation for her hypercoagulopathy. Statin has not been restarted-- and most recent LDL in March was too high to calculate. Her left spastic hemiparesis is unchanged, but she has been noting pain in her left calf intermittently, in the same location as her previous DVT. She is very concerned. No swelling or difficulty walking. She is wearing her left AFO. Blood pressures are controlled. She has been having issues with restless legs which has been interrupting her sleep. She never started the gabapentin ordered for her previously because of her pregnancy, but is interested in retrying it. She was referred to EP at her last visit to discuss her POTS, but because of her pregnancy and other issues she has not yet been to this referral.  She remains on Mestinon with no further issues.     No chest pain or palpitations  No SOB  No vertigo, lightheadedness or loss of consciousness  No falls, tripping or stumbling  No incontinence of bowels or bladder  No itching or bruising appreciated  No speech or swallowing troubles    ROS otherwise negative     Medications:     Current Outpatient Medications   Medication Sig Dispense Refill    amoxicillin-clavulanate (AUGMENTIN) 875-125 MG per tablet Take 1 tablet by mouth 2 times daily for 10 days 20 tablet 0    SYMBICORT 80-4.5 MCG/ACT AERO Inhale 2 puffs into the lungs 2 times daily 10.2 g 3    metoprolol succinate (TOPROL XL) 25 MG extended release tablet Take 0.5 tablets by mouth nightly 90 tablet 1    folic acid (FOLVITE) 1 MG tablet TAKE 1 TABLET BY MOUTH ONCE DAILY 90 tablet 1    Cholecalciferol (VITAMIN D3) 50 MCG (2000 UT) TABS Take one tablet daily 90 tablet 1    albuterol sulfate HFA (VENTOLIN HFA) 108 (90 Base) MCG/ACT inhaler Inhale 2 puffs into the lungs every 6 hours as needed for Wheezing 1 each 5    metFORMIN (GLUCOPHAGE) 1000 MG tablet Take 1 tablet by mouth nightly 180 tablet 1    apixaban (ELIQUIS) 5 MG TABS tablet Take 5 mg by mouth 2 times daily      clonazePAM (KLONOPIN) 1 MG tablet Take 1 mg by mouth 2 times daily as needed.  Handicap Placard MISC by Does not apply route Expires in 1 year 1 each 0    diclofenac sodium (VOLTAREN) 1 % GEL Apply 2 g topically 4 times daily 100 g 3    pyridostigmine (MESTINON) 60 MG tablet Take 1 tablet by mouth 3 times daily 270 tablet 1    citalopram (CELEXA) 20 MG tablet Take 30 mg by mouth daily       acetaminophen (TYLENOL) 500 MG tablet Take 500 mg by mouth every 6 hours as needed        No current facility-administered medications for this visit.      Objective:     /69   Pulse 91   Temp 97.9 °F (36.6 °C)   Wt 278 lb (126.1 kg)   SpO2 97%   BMI 44.87 kg/m²     General appearance: alert, appears stated age, cooperative and in no distress  Head: No major pericranial tenderness  Eyes: sclera clear  Neck: full ROM without cervicalgia  Lungs: clear to auscultation bilaterally  Heart: regular rate and rhythm--no murmur  Extremities: L AFO; left lower extremity without swelling; negative Homans' sign; somewhat warmer than right but was wearing AFO  Pulses: 2+ and symmetric  Skin:  color, texture, turgor normal--no rashes or lesions      Mental Status: alert and oriented x 4    Appropriate attention/concentration  Intact fundus of knowledge  Memories intact    Speech: no dysarthria  Language: no aphasias    Cranial Nerves:  I: smell    II: visual acuity     II: visual fields Full    II: pupils MILADIS   III,VII: ptosis None   III,IV,VI: extraocular muscles  EOMI without nystagmus   V: mastication Normal   V: facial light touch sensation  Normal   V,VII: corneal reflex     VII: facial muscle function - upper  Normal   VII: facial muscle function - lower Normal   VIII: hearing Normal   IX: soft palate elevation  Normal   IX,X: gag reflex    XI: trapezius strength  5/5   XI: sternocleidomastoid strength 5/5   XI: neck extension strength  5/5   XII: tongue strength  Normal     Motor:  Mild left spastic hemiparesis  5/5 right side  No drift  No abnormal movements    Sensory:  LT decreased LUE and LLE     Coordination:   FN, FFM normal on R  FFM and FN slightly impaired on L    Gait:  Limps on L--using AFO    DTR:   Right Brachioradialis reflex 2+  Left Brachioradialis reflex 2+  Right Biceps reflex 2+  Left Biceps reflex 2+  Right Triceps reflex 2+  Left Triceps reflex 2+  Right Quadriceps reflex 2+  Left Quadriceps reflex 2+    No Irving's    No other pathological reflexes    Laboratory/Radiology:     CBC with Differential:    Lab Results   Component Value Date    WBC 7.1 03/29/2022    RBC 4.34 03/29/2022    HGB 12.6 03/29/2022    HCT 38.4 03/29/2022     03/29/2022    MCV 88.5 03/29/2022    MCH 29.0 03/29/2022    MCHC 32.8 03/29/2022    RDW 14.4 03/29/2022    LYMPHOPCT 24.9 03/29/2022    MONOPCT 8.6 03/29/2022    BASOPCT 0.8 03/29/2022    MONOSABS 0.61 03/29/2022    LYMPHSABS 1.76 03/29/2022    EOSABS 0.24 03/29/2022    BASOSABS 0.06 03/29/2022     CMP:    Lab Results   Component Value Date     03/29/2022    K 4.4 03/29/2022    K 3.8 07/13/2021     03/29/2022    CO2 22 03/29/2022    BUN 10 03/29/2022    CREATININE 0.8 03/29/2022    GFRAA >60 03/29/2022    LABGLOM >60 03/29/2022    GLUCOSE 109 03/29/2022    PROT 8.0 03/29/2022    LABALBU 4.2 03/29/2022    CALCIUM 9.7 03/29/2022    BILITOT 0.3 03/29/2022    ALKPHOS 78 03/29/2022    AST 27 03/29/2022    ALT 44 03/29/2022     Hepatic Function Panel:    Lab Results   Component Value Date    ALKPHOS 78 03/29/2022    ALT 44 03/29/2022    AST 27 03/29/2022    PROT 8.0 03/29/2022    BILITOT 0.3 03/29/2022    BILIDIR <0.2 12/14/2016    IBILI 0.2 12/14/2016    LABALBU 4.2 03/29/2022     All labs and images personally reviewed today    Assessment:     Right hemispheric ischemic mild, stroke: with residual left sided weakness and spasticity. Stroke mechanism is thrombosis from underlying hypercoagulopathy in the setting of previous hormonal contraceptive use and smoking. We will restart gabapentin for dysesthesias, which may also help with her RLS. She was good control of her vascular risk factors. LLE pain: With her history and current symptoms, we will rule out DVT. Chronic migraines with aura: Had a very positive response to Emgality and Nurtec before she got pregnant, but is now having 15 headache days per month since being off these agents. We will restart them. POTS: EP input pending.   On Mestinon    Hypercoagulopathy: on OAC--follows with heme/onc    Plan:     Ultrasound BLE to rule out DVT    Load with Emgality and restart 120 mg monthly--samples given    Restart Nurtec as needed--- samples given    Gabapentin 200 mg nightly as needed    Restart atorvastatin at 20 mg dose--goal LDL less than 70    Continue anticoagulation    EP consult pending    Stroke s/s reviewed; keep careful headache diary    Return to office in 3 to 4 months or sooner as needed    Wilmer Barriga, ELADIO - CNP, [facility]  12:04 PM  4/7/2022

## 2022-04-07 NOTE — PATIENT INSTRUCTIONS
thoughts about suicide while taking this medicine. Children taking gabapentin may have behavior changes. Stay alert to changes in your mood or symptoms. Report any new or worsening symptoms to your doctor. It is not known whether this medicine will harm an unborn baby. Tell yourdoctor if you are pregnant or plan to become pregnant. Seizure control is very important during pregnancy, and having a seizure could harm both mother and baby. Do not start or stop taking gabapentin for seizures without your doctor's advice, and tell your doctor right away if you become pregnant. If you are pregnant, your name may be listed on a pregnancy registry to trackthe effects of gabapentin on the baby. It may not be safe to breastfeed while using this medicine. Ask your doctor about any risk. How should I take gabapentin? Follow all directions on your prescription label. Do not take this medicine inlarger or smaller amounts or for longer than recommended. If your doctor changes your brand, strength, or type of gabapentin, your dosage needs may change. Ask your pharmacist if you have any questions about the new kind of gabapentinyou receive at the pharmacy. Both Gralise and Horizant should be taken with food. Neurontin can be taken with or without food. If you break a Neurontin tablet and take only half of it, take the other half at your next dose. Any tablet that has been broken should be used as soon as possible or within a fewdays. Swallow the capsule  or tablet whole and do not crush, chew, break, or open it. Measure liquid medicine carefully. Use the dosing syringe provided, or use a medicine dose-measuringdevice (not a kitchen spoon). Do not stop using gabapentin suddenly, even if you feel fine. Stopping suddenly may cause increased seizures. Followyour doctor's instructions about tapering your dose. In case of emergency, wear or carry medical identification to let others knowyou have seizures.   This medicine can cause unusual results with certain medical tests. Tell anydoctor who treats you that you are using gabapentin. Store gabapentin tablets and capsules at room temperature away from light and moisture. Store the liquid medicine in the refrigerator. Do not freeze. What happens if I miss a dose? Take the medicine as soon as you can, but skip the missed dose if it is almost time for your next dose. Do not take two doses at one time. If you take Horizant:  Skip the missed dose and use your next dose at the regular time. Do not use two doses of Horizant at one time. What happens if I overdose? Seek emergency medical attention or call the Poison Help line at 1-344.447.7403. What should I avoid while taking gabapentin? Avoid driving or hazardous activity until you know how this medicine will affect you. Your reactions could be impaired. Dizziness or drowsiness can cause falls, accidents, or severe injuries. Avoid taking an antacid within 2 hours before you take gabapentin. Antacids canmake it harder for your body to absorb gabapentin. Avoid drinking alcohol while taking gabapentin. What are the possible side effects of gabapentin? Get emergency medical help if you have signs of an allergic reaction: hives; difficult breathing; swelling of your face, lips, tongue, or throat. Seek medical treatment if you have a serious drug reaction that can affect many parts of your body. Symptoms may include: skin rash, fever, swollen glands, muscle aches, severe weakness, unusual bruising, upper stomach pain, or yellowing of your skin oreyes. Report any new or worsening symptoms to your doctor, such as: mood or behavior changes, anxiety, panic attacks, trouble sleeping, or if you feel impulsive, irritable, agitated, hostile, aggressive, restless, hyperactive (mentally or physically), depressed, or have thoughts about suicideor hurting yourself.   Call your doctor at once if you have:   weak or shallow breathing;   blue-colored skin, lips, fingers, and toes;   confusion, extreme drowsiness or weakness;   problems with balance or muscle movement;   unusual or involuntary eye movements; or   increased seizures. Gabapentin can cause life-threatening breathing problems. A person caring for you should seek emergency medical attention if you have slow breathing with long pauses, blue colored lips, or if you are hard to wake up. Breathing problems may be more likely in older adults or in people withCOPD. Some side effects are more likely in children taking gabapentin. Contact your doctor if the child taking this medicine has any of the followingside effects:   changes in behavior;   memory problems;   trouble concentrating; or   acting restless, hostile, or aggressive. Common side effects may include:   fever, chills, sore throat, body aches, unusual tiredness;   jerky movements;   headache;   double vision;   swelling of your legs and feet;   tremors;   trouble speaking;   dizziness, drowsiness, tiredness;   problems with balance or eye movements; or   nausea, vomiting. This is not a complete list of side effects and others may occur. Call your doctor for medical advice about side effects. You may report side effects toFDA at [phone]-[phone]. What other drugs will affect gabapentin? Using gabapentin with other drugs that make you drowsy or slow your breathing can cause dangerous side effects or death. Ask your doctor before using opioid medication, a sleeping pill, cold orallergy medicine, a muscle relaxer, or medicine for anxiety or seizures. Other drugs may affect gabapentin, including prescription and over-the-counter medicines, vitamins, and herbal products. Tell your doctor about all yourcurrent medicines and any medicine you start or stop using. Where can I get more information? Your pharmacist can provide more information about gabapentin.   Remember, keep this and all other medicines out of the reach of children, never share your medicines with others, and use this medication only for the indication prescribed. Every effort has been made to ensure that the information provided by [address] is accurate, up-to-date, and complete, but no guarantee is made to that effect. Drug information contained herein may be time sensitive. [facility] information has been compiled for use by healthcare practitioners and consumers in the [place] and therefore [facility] does not warrant that uses outside of the [place] are appropriate, unless specifically indicated otherwise. [facility]'s drug information does not endorse drugs, diagnose patients or recommend therapy. [facility]'s drug information is an informational resource designed to assist licensed healthcare practitioners in caring for their patients and/or to serve consumers viewing this service as a supplement to, and not a substitute for, the expertise, skill, knowledge and judgment of healthcare practitioners. The absence of a warning for a given drug or drug combination in no way should be construed to indicate that the drug or drug combination is safe, effective or appropriate for any given patient. [facility] does not assume any responsibility for any aspect of healthcare administered with the aid of information [facility] provides. The information contained herein is not intended to cover all possible uses, directions, precautions, warnings, drug interactions, allergic reactions, or adverse effects. If you have questions about the drugs you are taking, check with yourdoctor, nurse or pharmacist.  Copyright 4106-7778 [address]: 17.01. Revision date:1/26/2021. Care instructions adapted under license by [facility] [place] ([facility]). If you have questions about a medical condition or this instruction, always ask your healthcare professional. [place] [address] any warranty or liability for your use of this information. Patient Education        Learning About BE FAST: Stroke Warning Signs  What are the BE FAST stroke warning signs? BE FAST is a simple way to remember the main symptoms of stroke. These symptoms happen suddenly. So knowing what to look for helps you know when to call formedical help. BE FAST stands for:   B alance. Loss of balance or trouble walking.  E yes. Trouble seeing out of one or both eyes.  F ace. Weakness or drooping on one side of the face.  A rm. Weakness or numbness in an arm or leg.  S peech. Trouble speaking.  T marge to call 911. Other stroke symptoms include sudden confusion, sudden trouble understandingsimple statements, fainting, a seizure, and a sudden, severe headache. What are the symptoms of a stroke? Symptoms of a stroke happen quickly. A stroke may cause:   Sudden numbness, tingling, weakness, or loss of movement in your face, arm, or leg, especially on only one side of your body.  Sudden vision changes.  Sudden trouble speaking.  Sudden confusion or trouble understanding simple statements.  Sudden problems with walking or balance.  A sudden, severe headache that is different from past headaches.  Fainting.  A seizure. It's important to call for medical help if you have stroke symptoms. Quick treatment may save your life. And it may reduce the damage in your brain sothat you have fewer problems after the stroke. What happens when you have a stroke? A stroke occurs when a blood vessel to the brain bursts or is blocked by a blood clot. The blood supply to part of the brain is reduced. Without blood and the oxygen it supplies, the nerve cells in that part of the brain die within minutes. As a result, the part of the body controlled by those cells cannotwork properly. The effects of a stroke may range from mild to severe. They may get better, or they may last the rest of your life.  A stroke can affect many things, includingvision, speech, behavior, thought processes, and your ability to move. Where can you learn more? Go to [email or web address] org and sign in to your [employer] account. Enter N645 in the [employer]) box to learn more about \"Learning About BE FAST: Stroke Warning Signs. \"     If you do not have an account, please click on the \"Sign Up Now\" link. Current as of: July 6, 2021               Content Version: 13.2  © 2006-2022 [employer]. Care instructions adapted under license by [facility] ([facility]). If you have questions about a medical condition or this instruction, always ask your healthcare professional. [address] any warranty or liability for your use of this information.

## 2022-04-11 ENCOUNTER — TELEPHONE (OUTPATIENT)
Dept: FAMILY MEDICINE CLINIC | Age: 32
End: 2022-04-11

## 2022-04-11 NOTE — TELEPHONE ENCOUNTER
----- Message from Dustin Mesa sent at 4/11/2022  9:23 AM EDT -----  Subject: Referral Request    QUESTIONS   Reason for referral request? Patient is calling in requesting if we can   possible send her bloodwork to Rajni Recio, DO if possible her   appointment is tomorrow no fax number was listed if can please advise   Has the physician seen you for this condition before? No   Preferred Specialist (if applicable)? Do you already have an appointment scheduled? Yes  Select Scheduled Date? 2022-04-12  Select Scheduled Physician? Additional Information for Provider?   ---------------------------------------------------------------------------  --------------  CALL BACK INFO  What is the best way for the office to contact you? OK to leave message on   voicemail  Preferred Call Back Phone Number? [phone]  ---------------------------------------------------------------------------  --------------  SCRIPT ANSWERS  Relationship to Patient?  Self

## 2022-04-11 NOTE — TELEPHONE ENCOUNTER
Results faxed via Epic.     Electronically signed by Felicity Whitt MA on 4/11/22 at 10:16 AM [email or web address]

## 2022-04-13 ENCOUNTER — TELEMEDICINE (OUTPATIENT)
Dept: FAMILY MEDICINE CLINIC | Age: 32
End: 2022-04-13
Payer: COMMERCIAL

## 2022-04-13 DIAGNOSIS — G90.A POTS (POSTURAL ORTHOSTATIC TACHYCARDIA SYNDROME): ICD-10-CM

## 2022-04-13 DIAGNOSIS — D68.2 FACTOR V DEFICIENCY (HCC): ICD-10-CM

## 2022-04-13 DIAGNOSIS — E06.3 HASHIMOTO'S THYROIDITIS: ICD-10-CM

## 2022-04-13 DIAGNOSIS — J45.20 MILD INTERMITTENT ASTHMA WITHOUT COMPLICATION: ICD-10-CM

## 2022-04-13 DIAGNOSIS — E66.01 MORBID OBESITY (HCC): ICD-10-CM

## 2022-04-13 DIAGNOSIS — E78.49 OTHER HYPERLIPIDEMIA: ICD-10-CM

## 2022-04-13 DIAGNOSIS — E11.9 TYPE 2 DIABETES MELLITUS WITHOUT COMPLICATION, WITHOUT LONG-TERM CURRENT USE OF INSULIN (HCC): Primary | ICD-10-CM

## 2022-04-13 PROCEDURE — G8427 DOCREV CUR MEDS BY ELIG CLIN: HCPCS | Performed by: FAMILY MEDICINE

## 2022-04-13 PROCEDURE — G8417 CALC BMI ABV UP PARAM F/U: HCPCS | Performed by: FAMILY MEDICINE

## 2022-04-13 PROCEDURE — 3044F HG A1C LEVEL LT 7.0%: CPT | Performed by: FAMILY MEDICINE

## 2022-04-13 PROCEDURE — 2022F DILAT RTA XM EVC RTNOPTHY: CPT | Performed by: FAMILY MEDICINE

## 2022-04-13 PROCEDURE — 99214 OFFICE O/P EST MOD 30 MIN: CPT | Performed by: FAMILY MEDICINE

## 2022-04-13 PROCEDURE — 1036F TOBACCO NON-USER: CPT | Performed by: FAMILY MEDICINE

## 2022-04-13 RX ORDER — PANTOPRAZOLE SODIUM 40 MG/1
40 TABLET, DELAYED RELEASE ORAL DAILY
COMMUNITY
Start: 2022-04-13

## 2022-04-13 RX ORDER — ATORVASTATIN CALCIUM 40 MG/1
40 TABLET, FILM COATED ORAL DAILY
Qty: 30 TABLET | Refills: 3
Start: 2022-04-13 | End: 2022-08-10 | Stop reason: SDUPTHER

## 2022-04-13 ASSESSMENT — ENCOUNTER SYMPTOMS
SHORTNESS OF BREATH: 0
DIARRHEA: 0
STRIDOR: 0
APNEA: 0
ALLERGIC/IMMUNOLOGIC NEGATIVE: 1
BLURRED VISION: 0
PHOTOPHOBIA: 0
TROUBLE SWALLOWING: 0
EYE PAIN: 0
ANAL BLEEDING: 0
VOICE CHANGE: 0
RESPIRATORY NEGATIVE: 1
CONSTIPATION: 0
RECTAL PAIN: 0
BACK PAIN: 1
ABDOMINAL PAIN: 1
SORE THROAT: 0
BLOOD IN STOOL: 0
ORTHOPNEA: 0
COUGH: 0
RHINORRHEA: 0
CHEST TIGHTNESS: 0
SINUS PRESSURE: 0
NAUSEA: 0
VOMITING: 0
FACIAL SWELLING: 0
EYE DISCHARGE: 0
EYE ITCHING: 0
SINUS PAIN: 0
COLOR CHANGE: 0
VISUAL CHANGE: 0
ABDOMINAL DISTENTION: 0
WHEEZING: 0
CHOKING: 0
EYE REDNESS: 0

## 2022-04-13 NOTE — PROGRESS NOTES
Oriana Schmidt is a 32 y.o. female. HPI/Chief C/O:  Chief Complaint   Patient presents with   [employer] [employer]     Pt calling to review lab results    Irritable Bowel Syndrome     Pt also calling to follow up from seeing GI     Allergies   Allergen Reactions    Latex Dermatitis   TeleMedicine Video Visit    Shay Ash, was evaluated through a synchronous (real-time) audio-video encounter. The patient (or guardian if applicable) is aware that this is a billable service. , which includes applicable co-pays. This virtual visit was conducted with the patient's  (and/or legal guardian's) consent. The visit was conducted pursuant to the emergency declaration under the [facility], [address] [place] [address] authority and the [employer] General Act. Patient identification was verified, and a caregiver was present when appropriate. The patient was located in a state where the provider was licensed to provide care. Patient identification was verified at the start of the visit, including the patient's telephone number and physical location. I discussed with the patient the nature of our telehealth visits, that:     Due to the nature of an audio- video modality, the only components of a physical exam that could be done are the elements supported by direct observation. I would evaluate the patient and recommend diagnostics and treatments based on my assessment. If it was felt that the patient should be evaluated in clinic or an emergency room setting, then they would be directed there. Our sessions are not being recorded and that personal health information is protected. Our team would provide follow up care in person if/when the patient needs it.            Patient's location: home address in [facility]  Physician  location home address in [place] other people involved in call  Are none      On this date 4/13/2022 I have spent 30  minutes reviewing previous notes, test results and face to face (virtual) with the patient discussing the diagnosis and importance of compliance with the treatment plan as well as documenting on the day of the visit. ,     This visit was completed virtually using Doxy. me  The patient is here for a medication list and treatment planning review  We will go over our care planning goals as well as take care of all refills  We will set up labs as well   She is post GI and abdominal pain is better           Diabetes  She presents for her follow-up diabetic visit. She has gestational diabetes mellitus. Hypoglycemia symptoms include nervousness/anxiousness. Pertinent negatives for hypoglycemia include no confusion, dizziness, headaches, pallor, seizures, speech difficulty, sweats or tremors. Associated symptoms include fatigue and weakness. Pertinent negatives for diabetes include no blurred vision, no chest pain, no foot paresthesias, no foot ulcerations, no polydipsia, no polyphagia, no polyuria, no visual change and no weight loss. There are no hypoglycemic complications. Diabetic complications include a CVA. Pertinent negatives for diabetic complications include no autonomic neuropathy, heart disease, nephropathy, peripheral neuropathy, PVD (H/O DVT lower extremity ) or retinopathy. Risk factors for coronary artery disease include obesity and diabetes mellitus. Current diabetic treatment includes diet and oral agent (monotherapy). She is compliant with treatment some of the time. She is following a generally unhealthy diet. An ACE inhibitor/angiotensin II receptor blocker is contraindicated. Hypertension  This is a chronic problem. The current episode started more than 1 year ago. The problem is controlled. Associated symptoms include anxiety and malaise/fatigue. Pertinent negatives include no blurred vision, chest pain, headaches, neck pain, orthopnea, palpitations, peripheral edema, PND, shortness of breath or sweats.  Risk factors for coronary artery disease include family history. Past treatments include lifestyle changes and beta blockers. The current treatment provides significant improvement. Compliance problems include exercise, diet and psychosocial issues. Hypertensive end-organ damage includes CVA. There is no history of angina, kidney disease, CAD/MI (leiden factor V), heart failure, left ventricular hypertrophy, PVD (H/O DVT lower extremity ) or retinopathy. Identifiable causes of hypertension include a thyroid problem. There is no history of chronic renal disease, coarctation of the aorta, hyperaldosteronism, hypercortisolism, hyperparathyroidism, a hypertension causing med, pheochromocytoma, renovascular disease or sleep apnea. Back Pain  This is a chronic problem. The current episode started more than 1 month ago. The problem occurs constantly. The problem has been gradually worsening since onset. The pain is present in the lumbar spine. The quality of the pain is described as shooting and stabbing. The pain is at a severity of 8/10. The pain is moderate. The pain is the same all the time. Stiffness is present all day. Associated symptoms include abdominal pain, leg pain and weakness. Pertinent negatives include no chest pain, dysuria, fever, headaches, numbness, paresis, paresthesias, pelvic pain, perianal numbness or weight loss. ROS:  Review of Systems   Constitutional: Positive for fatigue and malaise/fatigue. Negative for activity change, appetite change, chills, diaphoresis, fever, unexpected weight change and weight loss. HENT: Negative. Negative for congestion, dental problem, drooling, ear discharge, ear pain, facial swelling, hearing loss, mouth sores, nosebleeds, postnasal drip, rhinorrhea, sinus pressure, sinus pain, sneezing, sore throat, tinnitus, trouble swallowing and voice change. Eyes: Negative for blurred vision, photophobia, pain, discharge, redness, itching and visual disturbance. Respiratory: Negative. Negative for apnea, cough, choking, chest tightness, shortness of breath, wheezing and stridor. Cardiovascular: Negative. Negative for chest pain, palpitations, orthopnea, leg swelling and PND. Gastrointestinal: Positive for abdominal pain. Negative for abdominal distention, anal bleeding, blood in stool, constipation, diarrhea, nausea, rectal pain and vomiting. Endocrine: Negative. Negative for cold intolerance, heat intolerance, polydipsia, polyphagia and polyuria. Genitourinary: Negative. Negative for decreased urine volume, difficulty urinating, dyspareunia, dysuria, enuresis, flank pain, frequency, genital sores, hematuria, menstrual problem, pelvic pain, urgency and vaginal pain. Musculoskeletal: Positive for back pain and gait problem. Negative for arthralgias, joint swelling, myalgias, neck pain and neck stiffness. Skin: Negative. Negative for color change, pallor, rash and wound. Allergic/Immunologic: Negative. Negative for environmental allergies, food allergies and immunocompromised state. Neurological: Positive for weakness. Negative for dizziness, tremors, seizures, syncope, facial asymmetry, speech difficulty, light-headedness, numbness, headaches and paresthesias. Hematological: Negative for adenopathy. Bruises/bleeds easily. Psychiatric/Behavioral: Positive for decreased concentration and dysphoric mood. Negative for agitation, behavioral problems, confusion, hallucinations, self-injury, sleep disturbance and suicidal ideas. The patient is nervous/anxious. The patient is not hyperactive.          Past Medical/Surgical Hx;  Reviewed with patient      Diagnosis Date    Anemia     Anxiety     Asthma     Blood clot embolism during pregnancy, antepartum     Cerebrovascular accident (CVA) due to vascular occlusion ([facility]      delivery delivered 2021    Chronic migraine     Coagulopathy (HCC)     Diabetes mellitus ([facility]     DVT of leg (deep venous thrombosis) (HCC)     left    DVT of upper extremity (deep vein thrombosis) (HCC)     Left X 2    Heterozygous for BERT-1 4G allele     Low back pain     MTHFR mutation     Oligohydramnios in third trimester 2021    POTS (postural orthostatic tachycardia syndrome)     Shingles     Thrombophilia during pregnancy in third trimester ([facility] 2021    Thyroid disease      Past Surgical History:   Procedure Laterality Date     SECTION N/A 2021     SE[facility]    DILATION AND CURETTAGE OF UTERUS      FRACTURE SURGERY      10    NERVE BLOCK Left 2018    transforaminal epidural #1    AK ZEHRA NOSE/CLEFT LIP/TIP Left 2018    LEFT L5-S1 TRANSFORAMINAL EPIDURAL STEROID INJECTION #1 performed by Kashmir Alves DO at [facility] Right        Past Family Hx:  Reviewed with patient      Problem Relation Age of Onset    Diabetes Father     Deep Vein Thrombosis Maternal Grandmother     Substance Abuse Brother     Stroke Paternal Uncle        Social Hx:  Reviewed with patient  Social History     Tobacco Use    Smoking status: Former Smoker     Packs/day: 0.50     Types: Cigarettes     Start date:      Quit date: 2021     Years since quittin.8    Smokeless tobacco: Never Used   Substance Use Topics    Alcohol use: Not Currently       OBJECTIVE  There were no vitals taken for this visit. Problem List:  Bharati Sr does not have any pertinent problems on file. PHYS EX:  General: Awake/Alert/Oriented/No Acute Distress      ASSESSMENT/PLAN  Bharati Sr was seen today for discuss labs and irritable bowel syndrome.     Diagnoses and all orders for this visit:    Type 2 diabetes mellitus without complication, without long-term current use of insulin ([facility])    ---VASCULAR PANEL  A) asa, plavix, aggrenox  B) ELIQUIS, pletal, tzd, STATIN  C) ace, hctz, FOLIC, ccb  D) cannikinumab, fish oils     ---CARDIAC---asa, ace, BETA, STATIN, hctz, ( ccb )    A) ace or arb  B) LIPITOR 40  or crestor ( 20 to 40 )  C) glp-1 or sglt 2       Morbid obesity ([facility]  --talk diet and weight loss    [facility]  Long talk on treatment and prevention  Literature is given       Hashimoto's thyroiditis  --stable on current care planning  -- continue treatment as we are meeting goals       Mild intermittent asthma without complication  --stable on current care planning  -- continue treatment as we are meeting goals       Other hyperlipidemia  --Mediterranean diet, exercise, weight loss, vitamins    We have a long talk on cholesterol and importance of lowering it       POTS (postural orthostatic tachycardia syndrome)  --stable on current care planning  -- continue treatment as we are meeting goals           Outpatient Encounter Medications as of 4/13/2022   Medication Sig Dispense Refill    pantoprazole (PROTONIX) 40 MG tablet Take 40 mg by mouth daily      gabapentin (NEURONTIN) 100 MG capsule Take 2 capsules by mouth nightly as needed (leg pain/restlessness) for up to 180 days.  Intended supply: 90 days 360 capsule 0    Galcanezumab-gnlm (EMGALITY) 120 MG/ML SOAJ Inject 120 mg into the skin every 30 days 3 pen 3    Rimegepant Sulfate 75 MG TBDP Take 75 mg by mouth daily as needed (migraine) No more than 1 tab in 24 hours 8 tablet 3    atorvastatin (LIPITOR) 20 MG tablet Take 1 tablet by mouth nightly 90 tablet 3    Rimegepant Sulfate 75 MG TBDP Take 75 mg by mouth daily as needed (migraine) No more than 1 tab in 24 hours; lot [id number] exp 9/2024 16 tablet 0    SYMBICORT 80-4.5 MCG/ACT AERO Inhale 2 puffs into the lungs 2 times daily 10.2 g 3    metoprolol succinate (TOPROL XL) 25 MG extended release tablet Take 0.5 tablets by mouth nightly 90 tablet 1    folic acid (FOLVITE) 1 MG tablet TAKE 1 TABLET BY MOUTH ONCE DAILY 90 tablet 1    Cholecalciferol (VITAMIN D3) 50 MCG (2000 UT) TABS Take one tablet daily 90 tablet 1    albuterol sulfate HFA (VENTOLIN HFA) 108 (90 Base) MCG/ACT inhaler Inhale 2 puffs into the lungs every 6 hours as needed for Wheezing 1 each 5    metFORMIN (GLUCOPHAGE) 1000 MG tablet Take 1 tablet by mouth nightly 180 tablet 1    apixaban (ELIQUIS) 5 MG TABS tablet Take 5 mg by mouth 2 times daily      clonazePAM (KLONOPIN) 1 MG tablet Take 1 mg by mouth 2 times daily as needed.  Handicap Placard MISC by Does not apply route Expires in 1 year 1 each 0    diclofenac sodium (VOLTAREN) 1 % GEL Apply 2 g topically 4 times daily 100 g 3    pyridostigmine (MESTINON) 60 MG tablet Take 1 tablet by mouth 3 times daily 270 tablet 1    citalopram (CELEXA) 20 MG tablet Take 30 mg by mouth daily       acetaminophen (TYLENOL) 500 MG tablet Take 500 mg by mouth every 6 hours as needed       [DISCONTINUED] Galcanezumab-gnlm (EMGALITY) 120 MG/ML SOAJ Inject 240 mg (2 pens) on month one; then 120 mg monthly thereafter; lot [id number] exp 12/2022 4 pen 0     No facility-administered encounter medications on file as of 4/13/2022. No follow-ups on file.         Reviewed recent labs related to Gertrude's current problems      Discussed importance of regular Health Maintenance follow up  Health Maintenance   Topic    Varicella vaccine (1 of 2 - 2-dose childhood series)    COVID-19 Vaccine (1)    Pneumococcal 0-64 years Vaccine (1 - PCV)    Diabetic foot exam     HIV screen     Diabetic retinal exam     Hepatitis B vaccine (1 of 3 - Risk 3-dose series)    Cervical cancer screen     Flu vaccine (Season Ended)    Depression Screen     A1C test (Diabetic or Prediabetic)     Diabetic microalbuminuria test     Lipid screen     DTaP/Tdap/Td vaccine (2 - Td or Tdap)    Hepatitis C screen     Hepatitis A vaccine     Hib vaccine     Meningococcal (ACWY) vaccine

## 2022-04-27 ENCOUNTER — HOSPITAL ENCOUNTER (OUTPATIENT)
Age: 32
Discharge: HOME | End: 2022-04-27
Payer: COMMERCIAL

## 2022-04-27 ENCOUNTER — HOSPITAL ENCOUNTER (OUTPATIENT)
Dept: CT IMAGING | Age: 32
Discharge: HOME | End: 2022-04-27
Payer: COMMERCIAL

## 2022-04-27 ENCOUNTER — HOSPITAL ENCOUNTER (OUTPATIENT)
Dept: ULTRASOUND IMAGING | Age: 32
Discharge: HOME | End: 2022-04-27
Payer: COMMERCIAL

## 2022-04-27 DIAGNOSIS — M79.662 PAIN OF LEFT CALF: ICD-10-CM

## 2022-04-27 DIAGNOSIS — Z86.718 HISTORY OF DVT OF LOWER EXTREMITY: ICD-10-CM

## 2022-04-27 DIAGNOSIS — R10.9 ABDOMINAL PAIN, UNSPECIFIED ABDOMINAL LOCATION: ICD-10-CM

## 2022-04-27 DIAGNOSIS — R19.7 DIARRHEA, UNSPECIFIED TYPE: ICD-10-CM

## 2022-04-27 LAB
ALBUMIN SERPL-MCNC: 4.5 G/DL (ref 3.5–5.2)
ALP BLD-CCNC: 79 U/L (ref 35–104)
ALT SERPL-CCNC: 47 U/L (ref 0–32)
AMYLASE: 54 U/L (ref 20–100)
ANION GAP SERPL CALCULATED.3IONS-SCNC: 13 MMOL/L (ref 7–16)
AST SERPL-CCNC: 30 U/L (ref 0–31)
BASOPHILS ABSOLUTE: 0.06 E9/L (ref 0–0.2)
BASOPHILS RELATIVE PERCENT: 0.9 % (ref 0–2)
BILIRUB SERPL-MCNC: 0.4 MG/DL (ref 0–1.2)
BUN BLDV-MCNC: 11 MG/DL (ref 6–20)
C-REACTIVE PROTEIN: 0.3 MG/DL (ref 0–0.4)
CALCIUM SERPL-MCNC: 10 MG/DL (ref 8.6–10.2)
CHLORIDE BLD-SCNC: 99 MMOL/L (ref 98–107)
CO2: 21 MMOL/L (ref 22–29)
CREAT SERPL-MCNC: 0.8 MG/DL (ref 0.5–1)
EOSINOPHILS ABSOLUTE: 0.16 E9/L (ref 0.05–0.5)
EOSINOPHILS RELATIVE PERCENT: 2.4 % (ref 0–6)
FERRITIN: 21 NG/ML
FOLATE: >20 NG/ML (ref 4.8–24.2)
GFR AFRICAN AMERICAN: >60
GFR NON-AFRICAN AMERICAN: >60 ML/MIN/1.73
GLUCOSE BLD-MCNC: 103 MG/DL (ref 74–99)
HCT VFR BLD CALC: 39.8 % (ref 34–48)
HEMOGLOBIN: 13.3 G/DL (ref 11.5–15.5)
IGA: 209 MG/DL (ref 70–400)
IMMATURE GRANULOCYTES #: 0.03 E9/L
IMMATURE GRANULOCYTES %: 0.4 % (ref 0–5)
IMMATURE RETIC FRACT: 23.8 % (ref 3–15.9)
IRON SATURATION: 20 % (ref 15–50)
IRON: 86 MCG/DL (ref 37–145)
LIPASE: 23 U/L (ref 13–60)
LYMPHOCYTES ABSOLUTE: 1.7 E9/L (ref 1.5–4)
LYMPHOCYTES RELATIVE PERCENT: 25.4 % (ref 20–42)
MCH RBC QN AUTO: 29.5 PG (ref 26–35)
MCHC RBC AUTO-ENTMCNC: 33.4 % (ref 32–34.5)
MCV RBC AUTO: 88.2 FL (ref 80–99.9)
MONOCYTES ABSOLUTE: 0.56 E9/L (ref 0.1–0.95)
MONOCYTES RELATIVE PERCENT: 8.4 % (ref 2–12)
NEUTROPHILS ABSOLUTE: 4.19 E9/L (ref 1.8–7.3)
NEUTROPHILS RELATIVE PERCENT: 62.5 % (ref 43–80)
PDW BLD-RTO: 13.8 FL (ref 11.5–15)
PLATELET # BLD: 377 E9/L (ref 130–450)
PMV BLD AUTO: 9.6 FL (ref 7–12)
POTASSIUM SERPL-SCNC: 4.3 MMOL/L (ref 3.5–5)
RBC # BLD: 4.51 E12/L (ref 3.5–5.5)
RETIC HGB EQUIVALENT: 33.9 PG (ref 28.2–36.6)
RETICULOCYTE ABSOLUTE COUNT: 0.12 E12/L
RETICULOCYTE COUNT PCT: 2.6 % (ref 0.4–1.9)
SEDIMENTATION RATE, ERYTHROCYTE: 19 MM/HR (ref 0–20)
SODIUM BLD-SCNC: 133 MMOL/L (ref 132–146)
TOTAL IRON BINDING CAPACITY: 437 MCG/DL (ref 250–450)
TOTAL PROTEIN: 8.3 G/DL (ref 6.4–8.3)
VITAMIN B-12: 686 PG/ML (ref 211–946)
WBC # BLD: 6.7 E9/L (ref 4.5–11.5)

## 2022-04-27 PROCEDURE — 83540 ASSAY OF IRON: CPT

## 2022-04-27 PROCEDURE — 93970 EXTREMITY STUDY: CPT

## 2022-04-27 PROCEDURE — 85045 AUTOMATED RETICULOCYTE COUNT: CPT

## 2022-04-27 PROCEDURE — 82784 ASSAY IGA/IGD/IGG/IGM EACH: CPT

## 2022-04-27 PROCEDURE — 74177 CT ABD & PELVIS W/CONTRAST: CPT

## 2022-04-27 PROCEDURE — 6360000004 HC RX CONTRAST MEDICATION: Performed by: RADIOLOGY

## 2022-04-27 PROCEDURE — 80053 COMPREHEN METABOLIC PANEL: CPT

## 2022-04-27 PROCEDURE — 82607 VITAMIN B-12: CPT

## 2022-04-27 PROCEDURE — 82150 ASSAY OF AMYLASE: CPT

## 2022-04-27 PROCEDURE — 83690 ASSAY OF LIPASE: CPT

## 2022-04-27 PROCEDURE — 36415 COLL VENOUS BLD VENIPUNCTURE: CPT

## 2022-04-27 PROCEDURE — 85651 RBC SED RATE NONAUTOMATED: CPT

## 2022-04-27 PROCEDURE — 83550 IRON BINDING TEST: CPT

## 2022-04-27 PROCEDURE — 86140 C-REACTIVE PROTEIN: CPT

## 2022-04-27 PROCEDURE — 82728 ASSAY OF FERRITIN: CPT

## 2022-04-27 PROCEDURE — 83516 IMMUNOASSAY NONANTIBODY: CPT

## 2022-04-27 PROCEDURE — 85025 COMPLETE CBC W/AUTO DIFF WBC: CPT

## 2022-04-27 PROCEDURE — 82746 ASSAY OF FOLIC ACID SERUM: CPT

## 2022-04-27 RX ADMIN — IOPAMIDOL 75 ML: 755 INJECTION, SOLUTION INTRAVENOUS at 07:59

## 2022-04-27 RX ADMIN — IOHEXOL 50 ML: 240 INJECTION, SOLUTION INTRATHECAL; INTRAVASCULAR; INTRAVENOUS; ORAL at 07:59

## 2022-05-02 ENCOUNTER — TELEPHONE (OUTPATIENT)
Dept: FAMILY MEDICINE CLINIC | Age: 32
End: 2022-05-02

## 2022-05-02 LAB
GLIADIN ANTIBODIES IGA: 2.4 U/ML
GLIADIN ANTIBODIES IGG: 1.2 U/ML
TISSUE TRANSGLUTAMINASE ANTIBODY IGG: 1.4 U/ML
TISSUE TRANSGLUTAMINASE IGA: 0.4 U/ML

## 2022-05-02 NOTE — TELEPHONE ENCOUNTER
Pt called in over results from CT of abdomen and pelvis.  Pt would like doctor to review results, patient states she has a right lung mass and wants to know if she needs a CT of the chest. Please advise

## 2022-05-03 NOTE — TELEPHONE ENCOUNTER
This MA spoke with pt - pt advised of results and recommendations per Dr. Donnell Knapp. Pt verbalized she understood and is amendable to a 12 month follow up CT. Pt also inquired about the small left inguinal hernia and small umbilical hernia. Pt states that they only cause issues/pain with certain movements and activities. Please advise.     Electronically signed by Felicity Whitt MA on 5/3/22 at 5:04 PM EDT

## 2022-05-03 NOTE — TELEPHONE ENCOUNTER
The lung looks good and says \" no further follow up is needed. \"  However, I think we should repeat CT lung in 12 months

## 2022-06-14 ENCOUNTER — TELEMEDICINE (OUTPATIENT)
Dept: FAMILY MEDICINE CLINIC | Age: 32
End: 2022-06-14
Payer: COMMERCIAL

## 2022-06-14 DIAGNOSIS — K42.9 UMBILICAL HERNIA WITHOUT OBSTRUCTION AND WITHOUT GANGRENE: ICD-10-CM

## 2022-06-14 DIAGNOSIS — E66.01 MORBID OBESITY (HCC): ICD-10-CM

## 2022-06-14 DIAGNOSIS — J45.20 MILD INTERMITTENT ASTHMA WITHOUT COMPLICATION: ICD-10-CM

## 2022-06-14 DIAGNOSIS — K40.90 NON-RECURRENT UNILATERAL INGUINAL HERNIA WITHOUT OBSTRUCTION OR GANGRENE: ICD-10-CM

## 2022-06-14 DIAGNOSIS — E11.9 TYPE 2 DIABETES MELLITUS WITHOUT COMPLICATION, WITHOUT LONG-TERM CURRENT USE OF INSULIN (HCC): Primary | ICD-10-CM

## 2022-06-14 DIAGNOSIS — D68.2 FACTOR V DEFICIENCY (HCC): ICD-10-CM

## 2022-06-14 PROBLEM — D68.59 THROMBOPHILIA DURING PREGNANCY IN THIRD TRIMESTER (HCC): Status: RESOLVED | Noted: 2021-12-23 | Resolved: 2022-06-14

## 2022-06-14 PROBLEM — O99.113 THROMBOPHILIA DURING PREGNANCY IN THIRD TRIMESTER (HCC): Status: RESOLVED | Noted: 2021-12-23 | Resolved: 2022-06-14

## 2022-06-14 PROCEDURE — 99214 OFFICE O/P EST MOD 30 MIN: CPT | Performed by: FAMILY MEDICINE

## 2022-06-14 PROCEDURE — G8417 CALC BMI ABV UP PARAM F/U: HCPCS | Performed by: FAMILY MEDICINE

## 2022-06-14 PROCEDURE — G8427 DOCREV CUR MEDS BY ELIG CLIN: HCPCS | Performed by: FAMILY MEDICINE

## 2022-06-14 PROCEDURE — 3044F HG A1C LEVEL LT 7.0%: CPT | Performed by: FAMILY MEDICINE

## 2022-06-14 PROCEDURE — 2022F DILAT RTA XM EVC RTNOPTHY: CPT | Performed by: FAMILY MEDICINE

## 2022-06-14 PROCEDURE — 1036F TOBACCO NON-USER: CPT | Performed by: FAMILY MEDICINE

## 2022-06-14 ASSESSMENT — ENCOUNTER SYMPTOMS
ABDOMINAL PAIN: 1
EYE REDNESS: 0
EYE DISCHARGE: 0
COLOR CHANGE: 0
CONSTIPATION: 0
SINUS PAIN: 0
SINUS PRESSURE: 0
BACK PAIN: 1
NAUSEA: 0
APNEA: 0
COUGH: 0
CHEST TIGHTNESS: 0
TROUBLE SWALLOWING: 0
WHEEZING: 0
DIARRHEA: 0
EYE ITCHING: 0
VISUAL CHANGE: 0
RESPIRATORY NEGATIVE: 1
VOMITING: 0
SHORTNESS OF BREATH: 0
SORE THROAT: 0
STRIDOR: 0
RECTAL PAIN: 0
EYE PAIN: 0
VOICE CHANGE: 0
BLURRED VISION: 0
PHOTOPHOBIA: 0
ALLERGIC/IMMUNOLOGIC NEGATIVE: 1
ANAL BLEEDING: 0
RHINORRHEA: 0
BLOOD IN STOOL: 0
ABDOMINAL DISTENTION: 0
FACIAL SWELLING: 0
CHOKING: 0

## 2022-06-14 NOTE — PROGRESS NOTES
Young Velasquez is a 32 y.o. female. HPI/Chief C/O:  Chief Complaint   Patient presents with    Results     Pt calling to follow up from seeing GI    Diabetes     Pt wants to discuss Metformin after most recent A1C     Allergies   Allergen Reactions    Latex Dermatitis   TeleMedicine Video Visit    Adarsh Michelle, was evaluated through a synchronous (real-time) audio-video encounter. The patient (or guardian if applicable) is aware that this is a billable service. , which includes applicable co-pays. This virtual visit was conducted with the patient's  (and/or legal guardian's) consent. The visit was conducted pursuant to the emergency declaration under the [facility], [address] [place] [address] authority and the [employer] Act. Patient identification was verified, and a caregiver was present when appropriate. The patient was located in a state where the provider was licensed to provide care. Patient identification was verified at the start of the visit, including the patient's telephone number and physical location. I discussed with the patient the nature of our telehealth visits, that:     Due to the nature of an audio- video modality, the only components of a physical exam that could be done are the elements supported by direct observation. I would evaluate the patient and recommend diagnostics and treatments based on my assessment. If it was felt that the patient should be evaluated in clinic or an emergency room setting, then they would be directed there. Our sessions are not being recorded and that personal health information is protected. Our team would provide follow up care in person if/when the patient needs it.            Patient's location: home address in [facility]  Physician  location home address in [facility] other people involved in call  Are none      On this date 6/14/2022 I have spent 30  minutes reviewing previous notes, test results and face to face (virtual) with the patient discussing the diagnosis and importance of compliance with the treatment plan as well as documenting on the day of the visit. ,     This visit was completed virtually using Doxy. me  The patient is here for a medication list and treatment planning review  We will go over our care planning goals as well as take care of all refills  We will set up labs as well           Diabetes  She presents for her follow-up diabetic visit. She has gestational diabetes mellitus. Hypoglycemia symptoms include nervousness/anxiousness. Pertinent negatives for hypoglycemia include no confusion, dizziness, headaches, pallor, seizures, speech difficulty, sweats or tremors. Associated symptoms include fatigue and weakness. Pertinent negatives for diabetes include no blurred vision, no chest pain, no foot paresthesias, no foot ulcerations, no polydipsia, no polyphagia, no polyuria, no visual change and no weight loss. There are no hypoglycemic complications. Diabetic complications include a CVA. Pertinent negatives for diabetic complications include no autonomic neuropathy, heart disease, nephropathy, peripheral neuropathy, PVD (H/O DVT lower extremity ) or retinopathy. Risk factors for coronary artery disease include obesity and diabetes mellitus. Current diabetic treatment includes diet. She is compliant with treatment some of the time. She is following a generally unhealthy diet. An ACE inhibitor/angiotensin II receptor blocker is contraindicated. Hypertension  This is a chronic problem. The current episode started more than 1 year ago. The problem is controlled. Associated symptoms include anxiety and malaise/fatigue. Pertinent negatives include no blurred vision, chest pain, headaches, neck pain, palpitations, peripheral edema, shortness of breath or sweats. Risk factors for coronary artery disease include family history.  Past treatments include lifestyle changes and beta blockers. The current treatment provides significant improvement. Compliance problems include exercise, diet and psychosocial issues. Hypertensive end-organ damage includes CVA. There is no history of angina, kidney disease, CAD/MI (leiden factor V), heart failure, left ventricular hypertrophy, PVD (H/O DVT lower extremity ) or retinopathy. Identifiable causes of hypertension include a thyroid problem. There is no history of chronic renal disease, coarctation of the aorta, hyperaldosteronism, hypercortisolism, hyperparathyroidism, a hypertension causing med, pheochromocytoma, renovascular disease or sleep apnea. Back Pain  This is a chronic problem. The current episode started more than 1 month ago. The problem occurs constantly. The problem has been gradually worsening since onset. The pain is present in the lumbar spine. The quality of the pain is described as shooting and stabbing. The pain is at a severity of 8/10. The pain is moderate. The pain is the same all the time. Stiffness is present all day. Associated symptoms include abdominal pain, leg pain and weakness. Pertinent negatives include no chest pain, dysuria, fever, headaches, numbness, paresis, paresthesias, pelvic pain, perianal numbness or weight loss. ROS:  Review of Systems   Constitutional: Positive for fatigue and malaise/fatigue. Negative for activity change, appetite change, chills, diaphoresis, fever, unexpected weight change and weight loss. HENT: Negative. Negative for congestion, dental problem, drooling, ear discharge, ear pain, facial swelling, hearing loss, mouth sores, nosebleeds, postnasal drip, rhinorrhea, sinus pressure, sinus pain, sneezing, sore throat, tinnitus, trouble swallowing and voice change. Eyes: Negative for blurred vision, photophobia, pain, discharge, redness, itching and visual disturbance. Respiratory: Negative.   Negative for apnea, cough, choking, chest tightness, shortness of breath, wheezing and stridor. Cardiovascular: Negative. Negative for chest pain, palpitations and leg swelling. Gastrointestinal: Positive for abdominal pain. Negative for abdominal distention, anal bleeding, blood in stool, constipation, diarrhea, nausea, rectal pain and vomiting. Endocrine: Negative. Negative for cold intolerance, heat intolerance, polydipsia, polyphagia and polyuria. Genitourinary: Negative. Negative for decreased urine volume, difficulty urinating, dyspareunia, dysuria, enuresis, flank pain, frequency, genital sores, hematuria, menstrual problem, pelvic pain, urgency and vaginal pain. Musculoskeletal: Positive for back pain and gait problem. Negative for arthralgias, joint swelling, myalgias, neck pain and neck stiffness. Skin: Negative. Negative for color change, pallor, rash and wound. Allergic/Immunologic: Negative. Negative for environmental allergies, food allergies and immunocompromised state. Neurological: Positive for weakness. Negative for dizziness, tremors, seizures, syncope, facial asymmetry, speech difficulty, light-headedness, numbness, headaches and paresthesias. Hematological: Negative for adenopathy. Bruises/bleeds easily. Psychiatric/Behavioral: Positive for decreased concentration and dysphoric mood. Negative for agitation, behavioral problems, confusion, hallucinations, self-injury, sleep disturbance and suicidal ideas. The patient is nervous/anxious. The patient is not hyperactive.          Past Medical/Surgical Hx;  Reviewed with patient      Diagnosis Date    Anemia     Anxiety     Asthma     Blood clot embolism during pregnancy, antepartum     Cerebrovascular accident (CVA) due to vascular occlusion ([address]      delivery delivered 2021    Chronic migraine     Coagulopathy (HCC)     Diabetes mellitus ([address]     DVT of leg (deep venous thrombosis) ([address]     left    DVT of upper extremity (deep vein thrombosis) ([facility]     Left X 2    Heterozygous for BERT-1 4G allele     Low back pain     MTHFR mutation     Oligohydramnios in third trimester 2021    POTS (postural orthostatic tachycardia syndrome)     Shingles     Thrombophilia during pregnancy in third trimester ([facility] 2021    Thyroid disease      Past Surgical History:   Procedure Laterality Date     SECTION N/A 2021     SECTION performed by Darian Wright MD at [facility] L&D OR    DILATION AND CURETTAGE OF UTERUS      FRACTURE SURGERY      10    NERVE BLOCK Left 2018    transforaminal epidural #1    MO ZEHRA NOSE/CLEFT LIP/TIP Left 2018    LEFT L5-S1 TRANSFORAMINAL EPIDURAL STEROID INJECTION #1 performed by David Osborne DO at [facility] Right        Past Family Hx:  Reviewed with patient      Problem Relation Age of Onset    Diabetes Father     Deep Vein Thrombosis Maternal Grandmother     Substance Abuse Brother     Stroke Paternal Uncle        Social Hx:  Reviewed with patient  Social History     Tobacco Use    Smoking status: Former Smoker     Packs/day: 0.50     Types: Cigarettes     Start date:      Quit date: 2021     Years since quittin.9    Smokeless tobacco: Never Used   Substance Use Topics    Alcohol use: Not Currently       OBJECTIVE  There were no vitals taken for this visit. Problem List:  Mariama Duque does not have any pertinent problems on file. PHYS EX:  General: Awake/Alert/Oriented/No Acute Distress      ASSESSMENT/PLAN  Mariama Duque was seen today for results and diabetes.     Diagnoses and all orders for this visit:    Type 2 diabetes mellitus without complication, without long-term current use of insulin ([facility]  --ok to hold metformin    ---VASCULAR PANEL  A) asa, plavix, aggrenox  B) ELIQUIS,  pletal, tzd, STATIN  C) ace, hctz, FOLIC, ccb  D) cannikinumab, fish oils     ---CARDIAC---ELIQUIS, ace, BETA, STATIN, hctz, ( ccb )    A) ace or arb  B) lipitor ( 40-80 ) or crestor ( 20 to 40 )  C) glp-1 or sglt 2       Non-recurrent unilateral inguinal hernia without obstruction or gangrene  Long talk on treatment and prevention  Literature is given       Umbilical hernia without obstruction and without gangrene  Long talk on treatment and prevention  Literature is given       Mild intermittent asthma without complication  --stable on current care planning  -- continue treatment as we are meeting goals       Morbid obesity ([facility]  --talk diet and weight loss    Factor V deficiency ([facility]  --stable on current care planning  -- continue treatment as we are meeting goals           Outpatient Encounter Medications as of 6/14/2022   Medication Sig Dispense Refill    pantoprazole (PROTONIX) 40 MG tablet Take 40 mg by mouth daily      atorvastatin (LIPITOR) 40 MG tablet Take 1 tablet by mouth daily 30 tablet 3    gabapentin (NEURONTIN) 100 MG capsule Take 2 capsules by mouth nightly as needed (leg pain/restlessness) for up to 180 days. Intended supply: 90 days 360 capsule 0    Galcanezumab-gnlm (EMGALITY) 120 MG/ML SOAJ Inject 120 mg into the skin every 30 days 3 pen 3    Rimegepant Sulfate 75 MG TBDP Take 75 mg by mouth daily as needed (migraine) No more than 1 tab in 24 hours 8 tablet 3    SYMBICORT 80-4.5 MCG/ACT AERO Inhale 2 puffs into the lungs 2 times daily 10.2 g 3    metoprolol succinate (TOPROL XL) 25 MG extended release tablet Take 0.5 tablets by mouth nightly 90 tablet 1    folic acid (FOLVITE) 1 MG tablet TAKE 1 TABLET BY MOUTH ONCE DAILY 90 tablet 1    Cholecalciferol (VITAMIN D3) 50 MCG (2000 UT) TABS Take one tablet daily 90 tablet 1    albuterol sulfate HFA (VENTOLIN HFA) 108 (90 Base) MCG/ACT inhaler Inhale 2 puffs into the lungs every 6 hours as needed for Wheezing 1 each 5    apixaban (ELIQUIS) 5 MG TABS tablet Take 5 mg by mouth 2 times daily      clonazePAM (KLONOPIN) 1 MG tablet Take 1 mg by mouth 2 times daily as needed.       Handicap Placard MISC by Does not apply route Expires in 1 year 1 each 0    diclofenac sodium (VOLTAREN) 1 % GEL Apply 2 g topically 4 times daily 100 g 3    pyridostigmine (MESTINON) 60 MG tablet Take 1 tablet by mouth 3 times daily 270 tablet 1    citalopram (CELEXA) 20 MG tablet Take 30 mg by mouth daily       [DISCONTINUED] metFORMIN (GLUCOPHAGE) 1000 MG tablet Take 1 tablet by mouth nightly 180 tablet 1    [DISCONTINUED] acetaminophen (TYLENOL) 500 MG tablet Take 500 mg by mouth every 6 hours as needed        No facility-administered encounter medications on file as of 6/14/2022. No follow-ups on file.         Reviewed recent labs related to Gertrude's current problems      Discussed importance of regular Health Maintenance follow up  Health Maintenance   Topic    Varicella vaccine (1 of 2 - 2-dose childhood series)    COVID-19 Vaccine (1)    Pneumococcal 0-64 years Vaccine (1 - PCV)    Diabetic foot exam     HIV screen     Diabetic retinal exam     Hepatitis B vaccine (1 of 3 - Risk 3-dose series)    Cervical cancer screen     Flu vaccine (Season Ended)    Depression Screen     A1C test (Diabetic or Prediabetic)     Diabetic microalbuminuria test     Lipids     DTaP/Tdap/Td vaccine (2 - Td or Tdap)    Hepatitis C screen     Hepatitis A vaccine     Hib vaccine     Meningococcal (ACWY) vaccine

## 2022-08-10 ENCOUNTER — OFFICE VISIT (OUTPATIENT)
Dept: NEUROLOGY | Age: 32
End: 2022-08-10
Payer: COMMERCIAL

## 2022-08-10 VITALS
TEMPERATURE: 97.6 F | DIASTOLIC BLOOD PRESSURE: 72 MMHG | SYSTOLIC BLOOD PRESSURE: 118 MMHG | WEIGHT: 277 LBS | BODY MASS INDEX: 44.71 KG/M2 | OXYGEN SATURATION: 95 % | HEART RATE: 84 BPM

## 2022-08-10 DIAGNOSIS — G43.009 MIGRAINE WITHOUT AURA AND WITHOUT STATUS MIGRAINOSUS, NOT INTRACTABLE: ICD-10-CM

## 2022-08-10 DIAGNOSIS — I69.30 PERSONAL HISTORY OF STROKE WITH RESIDUAL EFFECTS: Primary | ICD-10-CM

## 2022-08-10 PROBLEM — O41.03X0 OLIGOHYDRAMNIOS IN THIRD TRIMESTER: Status: RESOLVED | Noted: 2021-12-23 | Resolved: 2022-08-10

## 2022-08-10 PROBLEM — K42.9 UMBILICAL HERNIA WITHOUT OBSTRUCTION AND WITHOUT GANGRENE: Status: RESOLVED | Noted: 2022-06-14 | Resolved: 2022-08-10

## 2022-08-10 PROBLEM — Z3A.37 37 WEEKS GESTATION OF PREGNANCY: Status: RESOLVED | Noted: 2021-12-23 | Resolved: 2022-08-10

## 2022-08-10 PROCEDURE — 1036F TOBACCO NON-USER: CPT | Performed by: NURSE PRACTITIONER

## 2022-08-10 PROCEDURE — 99214 OFFICE O/P EST MOD 30 MIN: CPT | Performed by: NURSE PRACTITIONER

## 2022-08-10 PROCEDURE — G8417 CALC BMI ABV UP PARAM F/U: HCPCS | Performed by: NURSE PRACTITIONER

## 2022-08-10 PROCEDURE — G8427 DOCREV CUR MEDS BY ELIG CLIN: HCPCS | Performed by: NURSE PRACTITIONER

## 2022-08-10 RX ORDER — ATORVASTATIN CALCIUM 40 MG/1
40 TABLET, FILM COATED ORAL DAILY
Qty: 90 TABLET | Refills: 3 | Status: SHIPPED | OUTPATIENT
Start: 2022-08-10

## 2022-08-10 NOTE — PROGRESS NOTES
[address] [facility] MSN, APRN-CNP, [address], [address] [facility]      [phone]                                  Macario Nunn is a 32 y.o. female    We are following her for stroke and migraines    She presents alone is a good historian    Unfortunately, she did not have the same positive response to [facility] that she did prior to having her baby. Nurtec is effective for rescue but she is having a chronic daily headache as well as 2-3 bad migraines per week. She is also on an SSRI, gabapentin, and beta-blocker. Migraine medications failed: Topiramate; triptans are contraindicated with her history of stroke and her use of SSRIs. The nightly gabapentin has helped with her left leg numbness. She is wearing her left AFO. She had 1 mechanical fall since her last visit because the left leg gave out. No major injuries. No bleeding issues on anticoagulation and high intensity statin. Pressures controlled    No chest pain or palpitations  No SOB  No vertigo, lightheadedness or loss of consciousness  No falls, tripping or stumbling  No incontinence of bowels or bladder  No itching or bruising appreciated  No speech or swallowing troubles    ROS otherwise negative     Medications:     Current Outpatient Medications   Medication Sig Dispense Refill    pantoprazole (PROTONIX) 40 MG tablet Take 40 mg by mouth daily      atorvastatin (LIPITOR) 40 MG tablet Take 1 tablet by mouth daily 30 tablet 3    gabapentin (NEURONTIN) 100 MG capsule Take 2 capsules by mouth nightly as needed (leg pain/restlessness) for up to 180 days.  Intended supply: 90 days 360 capsule 0    Galcanezumab-gnlm (EMGALITY) 120 MG/ML SOAJ Inject 120 mg into the skin every 30 days 3 pen 3    Rimegepant Sulfate 75 MG TBDP Take 75 mg by mouth daily as needed (migraine) No more than 1 tab in 24 hours 8 tablet 3    metoprolol succinate (TOPROL XL) 25 MG extended release tablet Take 0.5 tablets by mouth nightly 90 tablet 1    folic acid (FOLVITE) 1 MG tablet TAKE 1 TABLET BY MOUTH ONCE DAILY 90 tablet 1    Cholecalciferol (VITAMIN D3) 50 MCG (2000 UT) TABS Take one tablet daily 90 tablet 1    albuterol sulfate HFA (VENTOLIN HFA) 108 (90 Base) MCG/ACT inhaler Inhale 2 puffs into the lungs every 6 hours as needed for Wheezing 1 each 5    apixaban (ELIQUIS) 5 MG TABS tablet Take 5 mg by mouth 2 times daily      clonazePAM (KLONOPIN) 1 MG tablet Take 1 mg by mouth 2 times daily as needed. Handicap Placard MISC by Does not apply route Expires in 1 year 1 each 0    diclofenac sodium (VOLTAREN) 1 % GEL Apply 2 g topically 4 times daily 100 g 3    pyridostigmine (MESTINON) 60 MG tablet Take 1 tablet by mouth 3 times daily 270 tablet 1    citalopram (CELEXA) 20 MG tablet Take 30 mg by mouth daily        No current facility-administered medications for this visit.      Objective:     /72 (Site: Left Upper Arm)   Pulse 84   Temp 97.6 °F (36.4 °C)   Wt 277 lb (125.6 kg)   SpO2 95%   BMI 44.71 kg/m²     General appearance: alert, appears stated age, cooperative and in no distress  Head: No major pericranial tenderness  Eyes: sclera clear  Neck: full ROM without cervicalgia  Lungs: clear to auscultation bilaterally  Heart: regular rate and rhythm--no murmur  Extremities: L AFO; no cyanosis or edema  Pulses: 2+ and symmetric  Skin:  color, texture, turgor normal--no rashes or lesions      Mental Status: alert and oriented x 4--- pleasant    Appropriate attention/concentration  Intact fundus of knowledge  Memories intact    Speech: no dysarthria  Language: no aphasias    Cranial Nerves:  I: smell    II: visual acuity     II: visual fields Full    II: pupils MILADIS   III,VII: ptosis None   III,IV,VI: extraocular muscles  EOMI without nystagmus   V: mastication Normal   V: facial light touch sensation  Normal   V,VII: corneal reflex     VII: facial muscle function - upper  Normal   VII: facial muscle function - lower Normal   VIII: hearing Normal   IX: soft palate elevation  Normal   IX,X: gag reflex    XI: trapezius strength  5/5   XI: sternocleidomastoid strength 5/5   XI: neck extension strength  5/5   XII: tongue strength  Normal     Motor:  Mild left spastic hemiparesis  5/5 right side  No drift  No abnormal movements    Sensory:  LT decreased LUE and LLE     Coordination:   FN, FFM normal on R  FFM and FN very slightly impaired on L relative to weakness    Gait:  Limps on L--using AFO    DTR:   Right Brachioradialis reflex 2+  Left Brachioradialis reflex 2+  Right Biceps reflex 2+  Left Biceps reflex 2+  Right Triceps reflex 2+  Left Triceps reflex 2+  Right Quadriceps reflex 2+  Left Quadriceps reflex 2+    No Irving's    No other pathological reflexes    Laboratory/Radiology:     CBC with Differential:    Lab Results   Component Value Date/Time    WBC 6.7 04/27/2022 08:34 AM    RBC 4.51 04/27/2022 08:34 AM    HGB 13.3 04/27/2022 08:34 AM    HCT 39.8 04/27/2022 08:34 AM     04/27/2022 08:34 AM    MCV 88.2 04/27/2022 08:34 AM    MCH 29.5 04/27/2022 08:34 AM    MCHC 33.4 04/27/2022 08:34 AM    RDW 13.8 04/27/2022 08:34 AM    LYMPHOPCT 25.4 04/27/2022 08:34 AM    MONOPCT 8.4 04/27/2022 08:34 AM    BASOPCT 0.9 04/27/2022 08:34 AM    MONOSABS 0.56 04/27/2022 08:34 AM    LYMPHSABS 1.70 04/27/2022 08:34 AM    EOSABS 0.16 04/27/2022 08:34 AM    BASOSABS 0.06 04/27/2022 08:34 AM     CMP:    Lab Results   Component Value Date/Time     04/27/2022 08:34 AM    K 4.3 04/27/2022 08:34 AM    K 3.8 07/13/2021 10:01 PM    CL 99 04/27/2022 08:34 AM    CO2 21 04/27/2022 08:34 AM    BUN 11 04/27/2022 08:34 AM    CREATININE 0.8 04/27/2022 08:34 AM    GFRAA >60 04/27/2022 08:34 AM    LABGLOM >60 04/27/2022 08:34 AM    GLUCOSE 103 04/27/2022 08:34 AM    PROT 8.3 04/27/2022 08:34 AM    LABALBU 4.5 04/27/2022 08:34 AM    CALCIUM 10.0 04/27/2022 08:34 AM    BILITOT 0.4 04/27/2022 08:34 AM    ALKPHOS 79 04/27/2022 08:34 AM    AST 30 04/27/2022 08:34 AM    ALT 47 04/27/2022 08:34 AM     Hepatic Function Panel:    Lab Results   Component Value Date/Time    ALKPHOS 79 04/27/2022 08:34 AM    ALT 47 04/27/2022 08:34 AM    AST 30 04/27/2022 08:34 AM    PROT 8.3 04/27/2022 08:34 AM    BILITOT 0.4 04/27/2022 08:34 AM    BILIDIR <0.2 12/14/2016 12:00 PM    IBILI 0.2 12/14/2016 12:00 PM    LABALBU 4.5 04/27/2022 08:34 AM     US BLE April 2022: No evidence of DVT in either lower extremity     All labs and images personally reviewed today    Assessment:     Right hemispheric ischemic stroke: Secondary to thrombosis from underlying hypercoagulopathy while on hormonal contraceptives and smoking. She has mild residual left sided weakness and spasticity. Migraines with aura: 9-12 headaches per month despite reinitiation of Emgality. She has a positive response to Nurtec for abortive treatment and we can also utilize this in a preventive fashion. POTS: Previously followed with EP. On Mestinon    Factor V deficiency:  on Eliquis. follows with heme/onc    Plan:     -Stop Emgality  -Change Nurtec to every other day and PRN breakthrough HA  -Continue gabapentin 200 mg nightly PRN  -Continue anticoag and high intensity statin    Mod vasc risk factors: goal BP <140/90, LDL <70, A1C <7.0    Reviewed signs and symptoms of stroke including B.E.F.A.S. T:   Balance: Does the person have a sudden loss of balance? Eyes: Has the person lost vision in one or both eyes? Face: Does the person's face look uneven? Arm: Is one arm weaker or numb? Speech: Is the person's speech slurred? Does the person have trouble speaking or seen confused? Time: Call 9-1-1 immediately.       Return to office in 6 months or sooner as needed    ELADIO Riley CNP, [facility]  2:13 PM  8/10/2022

## 2022-08-20 DIAGNOSIS — I63.50 CEREBRAL ARTERY OCCLUSION WITH CEREBRAL INFARCTION (HCC): Chronic | ICD-10-CM

## 2022-08-22 RX ORDER — FOLIC ACID 1 MG/1
TABLET ORAL
Qty: 90 TABLET | Refills: 0 | Status: SHIPPED
Start: 2022-08-22 | End: 2022-10-19

## 2022-09-23 RX ORDER — GABAPENTIN 100 MG/1
200 CAPSULE ORAL NIGHTLY PRN
Qty: 180 CAPSULE | Refills: 3 | Status: SHIPPED | OUTPATIENT
Start: 2022-09-23 | End: 2023-09-18

## 2022-10-07 ENCOUNTER — OFFICE VISIT (OUTPATIENT)
Dept: FAMILY MEDICINE CLINIC | Age: 32
End: 2022-10-07
Payer: COMMERCIAL

## 2022-10-07 VITALS
BODY MASS INDEX: 44.84 KG/M2 | OXYGEN SATURATION: 98 % | SYSTOLIC BLOOD PRESSURE: 100 MMHG | TEMPERATURE: 97 F | DIASTOLIC BLOOD PRESSURE: 78 MMHG | RESPIRATION RATE: 18 BRPM | WEIGHT: 279 LBS | HEART RATE: 78 BPM | HEIGHT: 66 IN

## 2022-10-07 DIAGNOSIS — R53.83 OTHER FATIGUE: ICD-10-CM

## 2022-10-07 DIAGNOSIS — E78.5 DYSLIPIDEMIA: ICD-10-CM

## 2022-10-07 DIAGNOSIS — E11.9 TYPE 2 DIABETES MELLITUS WITHOUT COMPLICATION, WITHOUT LONG-TERM CURRENT USE OF INSULIN (HCC): Primary | ICD-10-CM

## 2022-10-07 DIAGNOSIS — G90.A POTS (POSTURAL ORTHOSTATIC TACHYCARDIA SYNDROME): ICD-10-CM

## 2022-10-07 DIAGNOSIS — D68.2 FACTOR V DEFICIENCY (HCC): ICD-10-CM

## 2022-10-07 DIAGNOSIS — K43.9 VENTRAL HERNIA WITHOUT OBSTRUCTION OR GANGRENE: ICD-10-CM

## 2022-10-07 LAB
CHP ED QC CHECK: NORMAL
GLUCOSE BLD-MCNC: 102 MG/DL
HBA1C MFR BLD: 6.1 %

## 2022-10-07 PROCEDURE — 82962 GLUCOSE BLOOD TEST: CPT | Performed by: FAMILY MEDICINE

## 2022-10-07 PROCEDURE — 2022F DILAT RTA XM EVC RTNOPTHY: CPT | Performed by: FAMILY MEDICINE

## 2022-10-07 PROCEDURE — 3044F HG A1C LEVEL LT 7.0%: CPT | Performed by: FAMILY MEDICINE

## 2022-10-07 PROCEDURE — 83036 HEMOGLOBIN GLYCOSYLATED A1C: CPT | Performed by: FAMILY MEDICINE

## 2022-10-07 PROCEDURE — G8427 DOCREV CUR MEDS BY ELIG CLIN: HCPCS | Performed by: FAMILY MEDICINE

## 2022-10-07 PROCEDURE — 96372 THER/PROPH/DIAG INJ SC/IM: CPT | Performed by: FAMILY MEDICINE

## 2022-10-07 PROCEDURE — 1036F TOBACCO NON-USER: CPT | Performed by: FAMILY MEDICINE

## 2022-10-07 PROCEDURE — 99214 OFFICE O/P EST MOD 30 MIN: CPT | Performed by: FAMILY MEDICINE

## 2022-10-07 PROCEDURE — G8417 CALC BMI ABV UP PARAM F/U: HCPCS | Performed by: FAMILY MEDICINE

## 2022-10-07 PROCEDURE — G8484 FLU IMMUNIZE NO ADMIN: HCPCS | Performed by: FAMILY MEDICINE

## 2022-10-07 RX ORDER — METHYLPREDNISOLONE 4 MG/1
TABLET ORAL
Qty: 1 KIT | Refills: 0 | Status: SHIPPED | OUTPATIENT
Start: 2022-10-07 | End: 2022-10-13

## 2022-10-07 RX ORDER — DEXAMETHASONE SODIUM PHOSPHATE 4 MG/ML
4 INJECTION, SOLUTION INTRA-ARTICULAR; INTRALESIONAL; INTRAMUSCULAR; INTRAVENOUS; SOFT TISSUE ONCE
Status: COMPLETED | OUTPATIENT
Start: 2022-10-07 | End: 2022-10-07

## 2022-10-07 RX ORDER — GUAIFENESIN 600 MG/1
600 TABLET, EXTENDED RELEASE ORAL 2 TIMES DAILY
Qty: 30 TABLET | Refills: 0 | Status: SHIPPED | OUTPATIENT
Start: 2022-10-07 | End: 2022-10-22

## 2022-10-07 RX ADMIN — DEXAMETHASONE SODIUM PHOSPHATE 4 MG: 4 INJECTION, SOLUTION INTRA-ARTICULAR; INTRALESIONAL; INTRAMUSCULAR; INTRAVENOUS; SOFT TISSUE at 12:48

## 2022-10-07 ASSESSMENT — ENCOUNTER SYMPTOMS
COLOR CHANGE: 0
TROUBLE SWALLOWING: 0
EYE REDNESS: 0
RECTAL PAIN: 0
PHOTOPHOBIA: 0
BACK PAIN: 1
COUGH: 1
VISUAL CHANGE: 0
SHORTNESS OF BREATH: 0
CHEST TIGHTNESS: 0
FACIAL SWELLING: 0
ALLERGIC/IMMUNOLOGIC NEGATIVE: 1
ANAL BLEEDING: 0
CONSTIPATION: 0
SINUS PRESSURE: 1
STRIDOR: 0
EYE PAIN: 0
APNEA: 0
DIARRHEA: 0
WHEEZING: 0
NAUSEA: 0
BLURRED VISION: 0
CHOKING: 0
ABDOMINAL DISTENTION: 0
EYE DISCHARGE: 0
SORE THROAT: 0
SINUS PAIN: 0
RHINORRHEA: 0
EYE ITCHING: 0
VOICE CHANGE: 0
VOMITING: 0
BLOOD IN STOOL: 0
ABDOMINAL PAIN: 1

## 2022-10-07 NOTE — PROGRESS NOTES
Linda Field is a 32 y.o. female. HPI/Chief C/O:  Chief Complaint   Patient presents with    Follow-up     Pt presents to the office for a 6 month follow up. Pt states she is just getting over being sick. States her ears are still painful. Would like a referral for her hernias. Results     Would like to go over her colonoscopy. Wants to do another scan since she has a mass on her right lung. Medication Refill     Pharmacy confirmed. Meds pended. Diabetes     Pt here for a follow up on her diabetes. Allergies   Allergen Reactions    Latex Dermatitis   The patient is here for a medication list and treatment planning review  We will go over our care planning goals as well as take care of all refills  We will set up labs as well     C/O sinus congestion with right ear pain    Diabetes  She presents for her follow-up diabetic visit. She has gestational diabetes mellitus. Hypoglycemia symptoms include nervousness/anxiousness. Pertinent negatives for hypoglycemia include no confusion, dizziness, headaches, pallor, seizures, speech difficulty, sweats or tremors. Associated symptoms include fatigue and weakness. Pertinent negatives for diabetes include no blurred vision, no chest pain, no foot paresthesias, no foot ulcerations, no polydipsia, no polyphagia, no polyuria, no visual change and no weight loss. There are no hypoglycemic complications. Diabetic complications include a CVA. Pertinent negatives for diabetic complications include no autonomic neuropathy, heart disease, nephropathy, peripheral neuropathy, PVD (H/O DVT lower extremity ) or retinopathy. Risk factors for coronary artery disease include obesity and diabetes mellitus. Current diabetic treatment includes diet. She is compliant with treatment some of the time. She is following a generally unhealthy diet. An ACE inhibitor/angiotensin II receptor blocker is contraindicated. Hypertension  This is a chronic problem.  The drooling, ear discharge, facial swelling, hearing loss, mouth sores, nosebleeds, postnasal drip, rhinorrhea, sinus pain, sneezing, sore throat, tinnitus, trouble swallowing and voice change. Eyes:  Negative for blurred vision, photophobia, pain, discharge, redness, itching and visual disturbance. Respiratory:  Positive for cough. Negative for apnea, choking, chest tightness, shortness of breath, wheezing and stridor. Cardiovascular: Negative. Negative for chest pain, palpitations and leg swelling. Gastrointestinal:  Positive for abdominal pain. Negative for abdominal distention, anal bleeding, blood in stool, constipation, diarrhea, nausea, rectal pain and vomiting. Endocrine: Negative. Negative for cold intolerance, heat intolerance, polydipsia, polyphagia and polyuria. Genitourinary: Negative. Negative for decreased urine volume, difficulty urinating, dyspareunia, dysuria, enuresis, flank pain, frequency, genital sores, hematuria, menstrual problem, pelvic pain, urgency and vaginal pain. Musculoskeletal:  Positive for back pain and gait problem. Negative for arthralgias, joint swelling, myalgias, neck pain and neck stiffness. Skin: Negative. Negative for color change, pallor, rash and wound. Allergic/Immunologic: Negative. Negative for environmental allergies, food allergies and immunocompromised state. Neurological:  Positive for weakness. Negative for dizziness, tremors, seizures, syncope, facial asymmetry, speech difficulty, light-headedness, numbness, headaches and paresthesias. Hematological:  Negative for adenopathy. Bruises/bleeds easily. Psychiatric/Behavioral:  Positive for decreased concentration and dysphoric mood. Negative for agitation, behavioral problems, confusion, hallucinations, self-injury, sleep disturbance and suicidal ideas. The patient is nervous/anxious. The patient is not hyperactive.        Past Medical/Surgical Hx;  Reviewed with patient      Diagnosis Date Anemia     Anxiety     Asthma     Blood clot embolism during pregnancy, antepartum     Cerebrovascular accident (CVA) due to vascular occlusion [facility])      delivery delivered 2021    Chronic migraine     Coagulopathy ([facility]     Diabetes mellitus ([facility]     DVT of leg (deep venous thrombosis) (HCC)     left    DVT of upper extremity (deep vein thrombosis) (HCC)     Left X 2    Heterozygous for BERT-1 4G allele     Low back pain     MTHFR mutation     Oligohydramnios in third trimester 2021    POTS (postural orthostatic tachycardia syndrome)     Shingles     Thrombophilia during pregnancy in third trimester ([facility] 2021    Thyroid disease      Past Surgical History:   Procedure Laterality Date     SECTION N/A 2021     SECTION performed by Zaire Garcia MD at [facility] L&D OR    DILATION AND CURETTAGE OF UTERUS      FRACTURE SURGERY      10    NERVE BLOCK Left 2018    transforaminal epidural #1    TN ZEHRA NOSE/CLEFT LIP/TIP Left 2018    LEFT L5-S1 TRANSFORAMINAL EPIDURAL STEROID INJECTION #1 performed by Ankur Amaro DO at [facility] [address]        Past Family Hx:  Reviewed with patient      Problem Relation Age of Onset    Diabetes Father     Deep Vein Thrombosis Maternal Grandmother     Substance Abuse Brother     Stroke Paternal Uncle        Social Hx:  Reviewed with patient  Social History     Tobacco Use    Smoking status: Former     Packs/day: 0.50     Types: Cigarettes     Start date:      Quit date: 2021     Years since quittin.2    Smokeless tobacco: Never   Substance Use Topics    Alcohol use: Not Currently       OBJECTIVE  /78   Pulse 78   Temp 97 °F (36.1 °C) (Temporal)   Resp 18   Ht 5' 6\" (1.676 m)   Wt 279 lb (126.6 kg)   LMP 2022 (Within Months)   SpO2 98%   Breastfeeding No   BMI 45.03 kg/m²     Problem List:  León Romeo does not have any pertinent problems on file.     PHYS EX:  Physical Exam  Vitals and nursing note reviewed. Constitutional:       General: She is not in acute distress. Appearance: Normal appearance. She is well-developed. She is obese. She is not ill-appearing, toxic-appearing or diaphoretic. HENT:      Head: Normocephalic and atraumatic. Right Ear: External ear normal. There is no impacted cerumen. Left Ear: External ear normal. There is no impacted cerumen. Nose: Nose normal. No congestion or rhinorrhea. Mouth/Throat:      Mouth: Mucous membranes are moist.      Pharynx: No oropharyngeal exudate or posterior oropharyngeal erythema. Eyes:      General: No scleral icterus. Right eye: No discharge. Left eye: No discharge. Extraocular Movements: Extraocular movements intact. Conjunctiva/sclera: Conjunctivae normal.      Pupils: Pupils are equal, round, and reactive to light. Neck:      Thyroid: No thyromegaly. Vascular: No carotid bruit or JVD. Trachea: No tracheal deviation. Cardiovascular:      Rate and Rhythm: Normal rate and regular rhythm. Pulses: Normal pulses. Heart sounds: Normal heart sounds. No murmur heard. No friction rub. No gallop. Pulmonary:      Effort: Pulmonary effort is normal. No respiratory distress. Breath sounds: Normal breath sounds. No stridor. No wheezing, rhonchi or rales. Chest:      Chest wall: No tenderness. Abdominal:      General: Bowel sounds are normal. There is no distension. Palpations: Abdomen is soft. There is no mass. Tenderness: There is no abdominal tenderness. There is no right CVA tenderness, left CVA tenderness, guarding or rebound. Hernia: No hernia is present. Musculoskeletal:         General: Tenderness (lumbar pain) present. No swelling, deformity or signs of injury. Normal range of motion. Cervical back: Normal range of motion and neck supple. No rigidity. No muscular tenderness.       Right lower leg: No edema. Left lower leg: No edema. Lymphadenopathy:      Cervical: No cervical adenopathy. Skin:     General: Skin is warm. Coloration: Skin is not jaundiced or pale. Findings: No bruising, erythema, lesion or rash. Neurological:      General: No focal deficit present. Mental Status: She is alert and oriented to person, place, and time. Cranial Nerves: No cranial nerve deficit. Sensory: Sensory deficit present. Motor: Weakness present. No abnormal muscle tone. Coordination: Coordination abnormal.      Gait: Gait abnormal.      Deep Tendon Reflexes: Reflexes abnormal.      Comments: Left foot drop        ASSESSMENT/PLAN  Ivonne Tao was seen today for follow-up, results, medication refill and diabetes. Diagnoses and all orders for this visit:    Type 2 diabetes mellitus without complication, without long-term current use of insulin (HCC)  -     POCT glycosylated hemoglobin (Hb A1C)  -     POCT Glucose  -     Comprehensive Metabolic Panel; Future  -     CBC with Auto Differential; Future  -     Hemoglobin A1C; Future    ---VASCULAR PANEL  A) asa, plavix, aggrenox  B) ELIQUIS, pletal, tzd, STATIN  C) ace, hctz, FOLIC, ccb  D) cannikinumab, fish oils     ---CARDIAC---ELIQUIS, ace, BETA, STATIN, hctz, ( ccb )    A) ace or arb  B) lipitor ( 40-80 ) or crestor ( 20 to 40 )  C) glp-1 or sglt 2       POTS (postural orthostatic tachycardia syndrome)  -     Comprehensive Metabolic Panel; Future  -     CBC with Auto Differential; Future  Long talk on treatment and prevention  Literature is given       Factor V deficiency ([facility] [address]  -     Comprehensive Metabolic Panel; Future  -     CBC with Auto Differential; Future  --stable on current care planning  -- continue treatment as we are meeting goals       Ventral hernia without obstruction or gangrene  -     Maile Delgado MD, General Surgery, [place]  -     Comprehensive Metabolic Panel;  Future  -     CBC with Auto Differential; Future    Dyslipidemia  -     Comprehensive Metabolic Panel; Future  -     Lipid Panel; Future  -     CBC with Auto Differential; Future  --Mediterranean diet, exercise, weight loss, vitamins    We have a long talk on cholesterol and importance of lowering it       Other fatigue  -     TSH; Future  -     Uric Acid; Future  -     Comprehensive Metabolic Panel; Future  -     CBC with Auto Differential; Future    Other orders  -     dexamethasone (DECADRON) injection 4 mg  -     guaiFENesin (MUCINEX) 600 MG extended release tablet; Take 1 tablet by mouth 2 times daily for 15 days  -     methylPREDNISolone (MEDROL DOSEPACK) 4 MG tablet; Take as directed      Outpatient Encounter Medications as of 10/7/2022   Medication Sig Dispense Refill    guaiFENesin (MUCINEX) 600 MG extended release tablet Take 1 tablet by mouth 2 times daily for 15 days 30 tablet 0    methylPREDNISolone (MEDROL DOSEPACK) 4 MG tablet Take as directed 1 kit 0    gabapentin (NEURONTIN) 100 MG capsule Take 2 capsules by mouth nightly as needed (restless legs/leg pain) for up to 360 days. 180 capsule 3    Rimegepant Sulfate 75 MG TBDP Take 75 mg by mouth every other day No more than 1 tab in 24 hours; ok for additional dose on off days PRN breakthrough headache 16 tablet 11    folic acid (FOLVITE) 1 MG tablet Take 1 tablet by mouth once daily 90 tablet 0    atorvastatin (LIPITOR) 40 MG tablet Take 1 tablet by mouth in the morning.  90 tablet 3    pantoprazole (PROTONIX) 40 MG tablet Take 40 mg by mouth daily      metoprolol succinate (TOPROL XL) 25 MG extended release tablet Take 0.5 tablets by mouth nightly 90 tablet 1    Cholecalciferol (VITAMIN D3) 50 MCG (2000 UT) TABS Take one tablet daily 90 tablet 1    albuterol sulfate HFA (VENTOLIN HFA) 108 (90 Base) MCG/ACT inhaler Inhale 2 puffs into the lungs every 6 hours as needed for Wheezing 1 each 5    apixaban (ELIQUIS) 5 MG TABS tablet Take 5 mg by mouth 2 times daily      clonazePAM (KLONOPIN) 1 MG tablet Take 1 mg by mouth 2 times daily as needed. Handicap Placard MISC by Does not apply route Expires in 1 year 1 each 0    diclofenac sodium (VOLTAREN) 1 % GEL Apply 2 g topically 4 times daily 100 g 3    pyridostigmine (MESTINON) 60 MG tablet Take 1 tablet by mouth 3 times daily 270 tablet 1    citalopram (CELEXA) 20 MG tablet Take 30 mg by mouth daily        Facility-Administered Encounter Medications as of 10/7/2022   Medication Dose Route Frequency Provider Last Rate Last Admin    dexamethasone (DECADRON) injection 4 mg  4 mg IntraMUSCular Once Emiliana Louis DO           No follow-ups on file.         Reviewed recent labs related to Gertrude's current problems      Discussed importance of regular Health Maintenance follow up  Health Maintenance   Topic    COVID-19 Vaccine (1)    Varicella vaccine (1 of 2 - 2-dose childhood series)    Pneumococcal 0-64 years Vaccine (1 - PCV)    Diabetic foot exam     HIV screen     Diabetic retinal exam     Cervical cancer screen     Flu vaccine (1)    Depression Screen     A1C test (Diabetic or Prediabetic)     Diabetic microalbuminuria test     Lipids     DTaP/Tdap/Td vaccine (2 - Td or Tdap)    Hepatitis C screen     Hepatitis A vaccine     Hepatitis B vaccine     Hib vaccine     Meningococcal (ACWY) vaccine

## 2022-10-12 ENCOUNTER — TELEPHONE (OUTPATIENT)
Dept: SURGERY | Age: 32
End: 2022-10-12

## 2022-10-12 ENCOUNTER — INITIAL CONSULT (OUTPATIENT)
Dept: SURGERY | Age: 32
End: 2022-10-12
Payer: COMMERCIAL

## 2022-10-12 VITALS
HEART RATE: 81 BPM | HEIGHT: 66 IN | SYSTOLIC BLOOD PRESSURE: 125 MMHG | DIASTOLIC BLOOD PRESSURE: 78 MMHG | WEIGHT: 279 LBS | TEMPERATURE: 98.1 F | BODY MASS INDEX: 44.84 KG/M2

## 2022-10-12 DIAGNOSIS — K40.20 BILATERAL INGUINAL HERNIA WITHOUT OBSTRUCTION OR GANGRENE, RECURRENCE NOT SPECIFIED: Primary | ICD-10-CM

## 2022-10-12 DIAGNOSIS — K42.9 UMBILICAL HERNIA WITHOUT OBSTRUCTION AND WITHOUT GANGRENE: ICD-10-CM

## 2022-10-12 PROCEDURE — G8417 CALC BMI ABV UP PARAM F/U: HCPCS | Performed by: SURGERY

## 2022-10-12 PROCEDURE — 1036F TOBACCO NON-USER: CPT | Performed by: SURGERY

## 2022-10-12 PROCEDURE — G8427 DOCREV CUR MEDS BY ELIG CLIN: HCPCS | Performed by: SURGERY

## 2022-10-12 PROCEDURE — G8484 FLU IMMUNIZE NO ADMIN: HCPCS | Performed by: SURGERY

## 2022-10-12 PROCEDURE — 99203 OFFICE O/P NEW LOW 30 MIN: CPT | Performed by: SURGERY

## 2022-10-12 NOTE — TELEPHONE ENCOUNTER
Prior Authorization Form:      DEMOGRAPHICS:                     Patient Name:  Tamika Xavier  Patient :  1990            Insurance:  Payor: Mini Zhang / Plan: [address] / Product Type: *No Product type* /   Insurance ID Number:    Payer/Plan Subscr  Sex Relation Sub.  Ins. ID Effective Group Num   1. [facility] 1990 Female Self [id number]                                    [id number]         DIAGNOSIS & PROCEDURE:                       Procedure/Operation: Laparoscopic ventral hernia repair, possible mesh and Laparoscopic bilateral inguinal hernia repair possible mesh           CPT Code: 29359, 10407    Diagnosis:  Ventral Hernia, Bilateral inguinal hernia    ICD10 Code:     Location:  [address] [place] [address]    Surgeon:  Kya Hwoard INFORMATION:                          Date: 10.20.2022    Time: TBD              Anesthesia:  General                                                       Status:  Outpatient        Special Comments:         Electronically signed by Merlin Ibrahim on 10/12/2022 at 3:03 PM

## 2022-10-12 NOTE — TELEPHONE ENCOUNTER
Per the order of Dr. Osmin Huffman, patient has been scheduled for Laparoscopic ventral hernia repair, possible mesh and laparoscopic bilateral inguinal hernia repair with possible mesh on 10.20.2022. Patient provided with procedure information during office visit and scheduled for post op follow up appointment. Patient instructed to please contact our office with any questions. Patient informed that she will need to hold her Eliquis 2 days prior to procedure. Surgery scheduled through Epic>  Dr. Tea Antonio to enter orders.

## 2022-10-17 ENCOUNTER — HOSPITAL ENCOUNTER (OUTPATIENT)
Dept: PREADMISSION TESTING | Age: 32
Discharge: HOME | End: 2022-10-17
Payer: COMMERCIAL

## 2022-10-17 VITALS
RESPIRATION RATE: 18 BRPM | BODY MASS INDEX: 44.2 KG/M2 | HEART RATE: 82 BPM | DIASTOLIC BLOOD PRESSURE: 65 MMHG | TEMPERATURE: 97.3 F | HEIGHT: 66 IN | WEIGHT: 275 LBS | OXYGEN SATURATION: 98 % | SYSTOLIC BLOOD PRESSURE: 109 MMHG

## 2022-10-17 DIAGNOSIS — Z01.818 PRE-OP TESTING: Primary | ICD-10-CM

## 2022-10-17 LAB
ANION GAP SERPL CALCULATED.3IONS-SCNC: 9 MMOL/L (ref 7–16)
BUN BLDV-MCNC: 9 MG/DL (ref 6–20)
CALCIUM SERPL-MCNC: 10.1 MG/DL (ref 8.6–10.2)
CHLORIDE BLD-SCNC: 106 MMOL/L (ref 98–107)
CO2: 22 MMOL/L (ref 22–29)
CREAT SERPL-MCNC: 0.8 MG/DL (ref 0.5–1)
EKG ATRIAL RATE: 72 BPM
EKG P AXIS: 14 DEGREES
EKG P-R INTERVAL: 148 MS
EKG Q-T INTERVAL: 358 MS
EKG QRS DURATION: 70 MS
EKG QTC CALCULATION (BAZETT): 392 MS
EKG R AXIS: 51 DEGREES
EKG T AXIS: 25 DEGREES
EKG VENTRICULAR RATE: 72 BPM
GFR AFRICAN AMERICAN: >60
GFR NON-AFRICAN AMERICAN: >60 ML/MIN/1.73
GLUCOSE BLD-MCNC: 105 MG/DL (ref 74–99)
HCG QUALITATIVE: NEGATIVE
HCT VFR BLD CALC: 41.7 % (ref 34–48)
HEMOGLOBIN: 13.8 G/DL (ref 11.5–15.5)
MCH RBC QN AUTO: 29.6 PG (ref 26–35)
MCHC RBC AUTO-ENTMCNC: 33.1 % (ref 32–34.5)
MCV RBC AUTO: 89.3 FL (ref 80–99.9)
PDW BLD-RTO: 12.8 FL (ref 11.5–15)
PLATELET # BLD: 351 E9/L (ref 130–450)
PMV BLD AUTO: 9.4 FL (ref 7–12)
POTASSIUM REFLEX MAGNESIUM: 4.4 MMOL/L (ref 3.5–5)
RBC # BLD: 4.67 E12/L (ref 3.5–5.5)
SODIUM BLD-SCNC: 137 MMOL/L (ref 132–146)
WBC # BLD: 6.2 E9/L (ref 4.5–11.5)

## 2022-10-17 PROCEDURE — 93005 ELECTROCARDIOGRAM TRACING: CPT | Performed by: ANESTHESIOLOGY

## 2022-10-17 PROCEDURE — 36415 COLL VENOUS BLD VENIPUNCTURE: CPT

## 2022-10-17 PROCEDURE — 85027 COMPLETE CBC AUTOMATED: CPT

## 2022-10-17 PROCEDURE — 80048 BASIC METABOLIC PNL TOTAL CA: CPT

## 2022-10-17 PROCEDURE — 84703 CHORIONIC GONADOTROPIN ASSAY: CPT

## 2022-10-17 RX ORDER — SODIUM CHLORIDE 9 MG/ML
INJECTION, SOLUTION INTRAVENOUS CONTINUOUS
Status: CANCELLED | OUTPATIENT
Start: 2022-10-17

## 2022-10-17 ASSESSMENT — PAIN SCALES - GENERAL: PAINLEVEL_OUTOF10: 7

## 2022-10-17 ASSESSMENT — PAIN DESCRIPTION - ORIENTATION: ORIENTATION: LOWER;MID

## 2022-10-17 ASSESSMENT — PAIN DESCRIPTION - DESCRIPTORS: DESCRIPTORS: ACHING;SHARP

## 2022-10-17 ASSESSMENT — PAIN DESCRIPTION - LOCATION: LOCATION: ABDOMEN

## 2022-10-17 NOTE — PROGRESS NOTES
Patient history POTS and taking Mestinon TID reviewed with Dr. Annabelle Nelson- patient to hold Mestinon on AM day of surgery - patient instructed of above.

## 2022-10-17 NOTE — PROGRESS NOTES
General Surgery History and Physical  [facility]    Patient's Name/Date of Birth: Ghassan Reece / 1990    Date: 10/12/22    Surgeon: Florence Singh MD    PCP: Carlos Lanier DO     Chief Complaint: umbilical bulge, groin pain    HPI:   Ghassan Reece is a 32 y.o. female who presents for evaluation of worsening abdominal discomfort and umbilicus and lower abdomen. Timing is constant, radiation to abdominal wall, alleviated by rest and started several months ago and severity is 5/10. States pain has been worsening, no symptoms of bowel obstruction, nausea, vomiting, fever, chills, abdominal pain. States umbilical bulge protrudes with activity, it is reducible.     Patient Active Problem List   Diagnosis    POTS (postural orthostatic tachycardia syndrome)    Hyperlipidemia    Factor V deficiency ([facility])    Mild intermittent asthma without complication    Anxiety    Hashimoto's thyroiditis    Lumbar radiculopathy    Morbid obesity ([facility])    Personal history of stroke with residual effects    Chronic migraine with aura    Insulin controlled gestational diabetes mellitus (GDM) in second trimester    Type 2 diabetes mellitus without complication, without long-term current use of insulin ([facility])    Non-recurrent unilateral inguinal hernia without obstruction or gangrene    Bilateral inguinal hernia       Past Medical History:   Diagnosis Date    Anemia     Anxiety     Asthma     Blood clot embolism during pregnancy, antepartum     Cerebrovascular accident (CVA) due to vascular occlusion ([address]      delivery delivered 2021    Chronic migraine     Coagulopathy ([facility])     Diabetes mellitus ([address]     DVT of leg (deep venous thrombosis) ([facility])     left    DVT of upper extremity (deep vein thrombosis) ([facility])     Left X 2    Heterozygous for BERT-1 4G allele     Low back pain     MTHFR mutation     Oligohydramnios in third trimester 2021    POTS (postural orthostatic tachycardia syndrome)     Shingles     Thrombophilia during pregnancy in third trimester ([facility] [address] 2021    Thyroid disease        Past Surgical History:   Procedure Laterality Date     SECTION N/A 2021     SECTION performed by Crissy Hunter MD at [facility] L&D OR    DILATION AND CURETTAGE OF UTERUS      FRACTURE SURGERY      10    NERVE BLOCK Left 2018    transforaminal epidural #1    VT ZEHRA NOSE/CLEFT LIP/TIP Left 2018    LEFT L5-S1 TRANSFORAMINAL EPIDURAL STEROID INJECTION #1 performed by Nehemias Mccarty DO at [facility] [address]        Allergies   Allergen Reactions    Latex Dermatitis       The patient has a family history that is negative for severe cardiovascular or respiratory issues, negative for reaction to anesthesia. Patient does not report any history of gastrointestinal cancer in the mother or father    Time spent reviewing past medical, surgical, social and family history, vitals, nursing assessment and images. No changes from above documented history.     Social History     Socioeconomic History    Marital status:      Spouse name: Not on file    Number of children: 0    Years of education: 12    Highest education level: Associate degree: occupational, technical, or vocational program   Occupational History    Not on file   Tobacco Use    Smoking status: Former     Packs/day: 0.50     Types: Cigarettes     Start date:      Quit date: 2021     Years since quittin.3    Smokeless tobacco: Never   Vaping Use    Vaping Use: Former    Quit date: 2019   Substance and Sexual Activity    Alcohol use: Not Currently    Drug use: No    Sexual activity: Yes     Partners: Male     Birth control/protection: Condom   Other Topics Concern    Not on file   Social History Narrative    ** Merged History Encounter **    19: Patient has no contact with mother, she does communicate with father and step-mother in [place] (has not seen in 5 years)    Patient used Vaping to assist with smoking cessation. Patient no longer uses Vaping. Social Determinants of Health     Financial Resource Strain: Low Risk     Difficulty of Paying Living Expenses: Not hard at all   Food Insecurity: No Food Insecurity    Worried About Running Out of Food in the Last Year: Never true    Ran Out of Food in the Last Year: Never true   Transportation Needs: Not on file   Physical Activity: Not on file   Stress: Not on file   Social Connections: Not on file   Intimate Partner Violence: Not on file   Housing Stability: Not on file         A complete 10 system review was performed and are otherwise negative unless mentioned in the above HPI. Specific negatives are listed below but may not include all those reviewed. General ROS: negative obtundation, AMS  ENT ROS: negative rhinorrhea, epistaxis  Allergy and Immunology ROS: negative itchy/watery eyes or nasal congestion  Hematological and Lymphatic ROS: negative spontaneous bleeding or bruising  Endocrine ROS: negative  lethargy, mood swings, palpitations or polydipsia/polyuria  Respiratory ROS: negative sputum changes, stridor, tachypnea or wheezing  Cardiovascular ROS: negative for - loss of consciousness, murmur or orthopnea  Gastrointestinal ROS: negative for - hematochezia or hematemesis  Genito-Urinary ROS: negative for -  genital discharge or hematuria  Musculoskeletal ROS: negative for - focal weakness, gangrene  Psych/Neuro ROS: negative for - visual or auditory hallucinations, suicidal ideation    Physical exam:   /78   Pulse 81   Temp 98.1 °F (36.7 °C)   Ht 5' 6\" (1.676 m)   Wt 279 lb (126.6 kg)   BMI 45.03 kg/m²   General appearance:  NAD, appears stated age  Head: NCAT, PERRLA, EOMI, red conjunctiva  Neck: supple, no masses, trachea midline  Lungs: Equal chest rise bilateral, no retractions, no wheezing  Heart: Reg rate  Abdomen: soft, nontender nondistended.  Reducible umbilical hernia  Skin; warm and dry, no cyanosis  Gu: no cva tenderness, palpable inguinal hernia bilaterally, reducible  Extremities: atraumatic, no focal motor deficits, no open wounds  Psych: No tremor, visual hallucinations        Radiology: I reviewed relevant abdominal imaging from this admission and that available in the EMR including CT abd/pel. My assessment is bilateral fat containing inguinal hernias, umbilical hernia      Assessment:  Isabela Rodriguez is a 32 y.o. female with umbilical hernia and bilateral inguinal hernias, nonrecurrent  Patient Active Problem List   Diagnosis    POTS (postural orthostatic tachycardia syndrome)    Hyperlipidemia    Factor V deficiency (HCC)    Mild intermittent asthma without complication    Anxiety    Hashimoto's thyroiditis    Lumbar radiculopathy    Morbid obesity ([address]    Personal history of stroke with residual effects    Chronic migraine with aura    Insulin controlled gestational diabetes mellitus (GDM) in second trimester    Type 2 diabetes mellitus without complication, without long-term current use of insulin (HCC)    Non-recurrent unilateral inguinal hernia without obstruction or gangrene    Bilateral inguinal hernia         Plan:  I discussed the above available imaging in the electronic medical record and reviewed images independently as well as in the office evaluation room with the patient when available. I selected relevant images and included them in the above note if they were available  I discussed at length inguinal anatomy, blood supply, neurovascular anatomy and options for surgical repair.   Discussed the incidence of incarceration and strangulation or emergency evaluation requiring surgery for inguinal hernia  I reviewed urgent care and medical specialist notes in the electronic medical record relating to the above complaints as well as any underlying medical conditions that may contribute to or prevent surgical intervention  I discussed nonoperative management, we also discussed reasons to seek urgent attention that are related to incarceration strangulation obstruction or ongoing problems with worsening obstipation  We discussed the risks of chronic groin pain associated with inguinal hernia surgery with or without mesh placement  We discussed surgical options excluding the use of mesh and locations where this may be performed and indications where this may be completed  I recommend proceeding to OR for laparoscopic robot assisted umbilical and bilateral inguinal hernia repair with mesh  This will be scheduled today for future date at the patient's request  Discussed the risk, benefits and alternatives of surgery including wound infections, bleeding, scar, recurrent hernia formation, mesh infection and migration, chronic groin pain, nerve injury, testicle injury, repeat procedures and the risks of general anesthetic including MI, CVA, sudden death or reactions to anesthetic medications. The patient understands the risks and alternatives and the possibility of converting to an open procedure. All questions were answered to the patient's satisfaction and they freely signed the consent.            Florence Singh MD

## 2022-10-17 NOTE — PROGRESS NOTES
[address] [facility]                                                                                                                    PRE OP INSTRUCTIONS FOR  Jane Lindsey        Date: 10/17/2022    Date of surgery: 10-20-22   Arrival Time: Hospital will call you between 5pm and 7pm on 10-19-22 with your final arrival time for surgery    Do not eat or drink anything after midnight prior to surgery. This includes no water, chewing gum, mints or ice chips. Take the following medications with a small sip of water on the morning of Surgery: use and bring albuterol inhaler, klonopin, celexa, protonix,    Diabetics may take evening dose of insulin but none after midnight. If you feel symptomatic or low blood sugar morning of surgery drink 1-2 ounces of apple juice only. Aspirin, Ibuprofen, Advil, Naproxen, Vitamin E and other Anti-inflammatory products should be stopped  before surgery  as directed by your physician. Take Tylenol only unless instructed otherwise by your surgeon. Check with your Doctor regarding stopping Plavix, Coumadin, Lovenox, Eliquis, Effient, or other blood thinners. Do not smoke,use illicit drugs and do not drink any alcoholic beverages 24 hours prior to surgery. You may brush your teeth the morning of surgery. DO NOT SWALLOW WATER    You MUST make arrangements for a responsible adult to take you home after your surgery. You will not be allowed to leave alone or drive yourself home. It is strongly suggested someone stay with you the first 24 hrs. Your surgery will be cancelled if you do not have a ride home. PEDIATRIC PATIENTS ONLY:  A parent/legal guardian must accompany a child scheduled for surgery and plan to stay at the hospital until the child is discharged. Please do not bring other children with you.     Please wear simple, loose fitting clothing to the hospital.  Tarun Connelly not bring valuables (money, credit cards, checkbooks, etc.) Do not wear any makeup (including no eye makeup) or nail polish on your fingers or toes. DO NOT wear any jewelry or piercings on day of surgery. All body piercing jewelry must be removed. Shower the night before surgery with __x_Antibacterial soap /URSULA WIPES________    TOTAL JOINT REPLACEMENT/HYSTERECTOMY PATIENTS ONLY---Remember to bring Blood Bank bracelet to the hospital on the day of surgery. If you have a Living Will and Durable Power of  for Healthcare, please bring in a copy. If appropriate bring crutches, inspirex, WALKER, CANE etc... Notify your Surgeon if you develop any illness between now and surgery time, cough, cold, fever, sore throat, nausea, vomiting, etc.  Please notify your surgeon if you experience dizziness, shortness of breath or blurred vision between now & the time of your surgery. If you have ___dentures, they will be removed before going to the OR; we will provide you a container. If you wear ___contact lenses or __x_glasses, they will be removed; please bring a case for them. To provide excellent care visitors will be limited to 2 in the room at any given time. Please bring picture ID and insurance card. Sleep apnea patients need to bring CPAP AND SETTINGS to hospital on day of surgery. During flu season no children under the age of 15 are permitted in the hospital for the safety of all patients. Other walk in through visitors entrance and check in at front lobby registration desk                  Please call AMBULATORY CARE if you have any further questions.    [address] [facility]     [address]

## 2022-10-19 ENCOUNTER — ANESTHESIA EVENT (OUTPATIENT)
Dept: OPERATING ROOM | Age: 32
End: 2022-10-19
Payer: COMMERCIAL

## 2022-10-19 DIAGNOSIS — I63.50 CEREBRAL ARTERY OCCLUSION WITH CEREBRAL INFARCTION (HCC): Chronic | ICD-10-CM

## 2022-10-19 DIAGNOSIS — G90.A POTS (POSTURAL ORTHOSTATIC TACHYCARDIA SYNDROME): ICD-10-CM

## 2022-10-19 DIAGNOSIS — R79.89 LOW VITAMIN D LEVEL: ICD-10-CM

## 2022-10-19 RX ORDER — METOPROLOL SUCCINATE 25 MG/1
12.5 TABLET, EXTENDED RELEASE ORAL NIGHTLY
Qty: 90 TABLET | Refills: 1 | Status: SHIPPED | OUTPATIENT
Start: 2022-10-19

## 2022-10-19 RX ORDER — FOLIC ACID 1 MG/1
TABLET ORAL
Qty: 90 TABLET | Refills: 0 | Status: SHIPPED | OUTPATIENT
Start: 2022-10-19

## 2022-10-19 RX ORDER — MULTIVIT-MIN/IRON/FOLIC ACID/K 18-600-40
CAPSULE ORAL
Qty: 90 CAPSULE | Refills: 0 | Status: SHIPPED | OUTPATIENT
Start: 2022-10-19

## 2022-10-19 NOTE — TELEPHONE ENCOUNTER
Cherylene Slate called in she was in the office on 10/7 and her A1C was 6.1 pt wanted to know if she needs to restart metformin and if she does she needs a script sent to the pharmacy       Last seen 10/7/2022  Next appt Visit date not found

## 2022-10-20 ENCOUNTER — ANESTHESIA (OUTPATIENT)
Dept: OPERATING ROOM | Age: 32
End: 2022-10-20
Payer: COMMERCIAL

## 2022-10-20 ENCOUNTER — HOSPITAL ENCOUNTER (OUTPATIENT)
Age: 32
Setting detail: OUTPATIENT SURGERY
Discharge: HOME | End: 2022-10-20
Attending: SURGERY | Admitting: SURGERY
Payer: COMMERCIAL

## 2022-10-20 VITALS
WEIGHT: 275 LBS | SYSTOLIC BLOOD PRESSURE: 118 MMHG | DIASTOLIC BLOOD PRESSURE: 72 MMHG | TEMPERATURE: 97.2 F | OXYGEN SATURATION: 98 % | RESPIRATION RATE: 16 BRPM | HEART RATE: 72 BPM | BODY MASS INDEX: 44.2 KG/M2 | HEIGHT: 66 IN

## 2022-10-20 DIAGNOSIS — K43.9 VENTRAL HERNIA: ICD-10-CM

## 2022-10-20 DIAGNOSIS — K40.20 BILATERAL INGUINAL HERNIA: ICD-10-CM

## 2022-10-20 LAB — METER GLUCOSE: 118 MG/DL (ref 74–99)

## 2022-10-20 PROCEDURE — 6360000002 HC RX W HCPCS: Performed by: SURGERY

## 2022-10-20 PROCEDURE — C1781 MESH (IMPLANTABLE): HCPCS | Performed by: SURGERY

## 2022-10-20 PROCEDURE — 3700000000 HC ANESTHESIA ATTENDED CARE: Performed by: SURGERY

## 2022-10-20 PROCEDURE — 7100000001 HC PACU RECOVERY - ADDTL 15 MIN: Performed by: SURGERY

## 2022-10-20 PROCEDURE — 7100000000 HC PACU RECOVERY - FIRST 15 MIN: Performed by: SURGERY

## 2022-10-20 PROCEDURE — 82962 GLUCOSE BLOOD TEST: CPT

## 2022-10-20 PROCEDURE — 49650 LAP ING HERNIA REPAIR INIT: CPT | Performed by: SURGERY

## 2022-10-20 PROCEDURE — 2580000003 HC RX 258: Performed by: SURGERY

## 2022-10-20 PROCEDURE — 2709999900 HC NON-CHARGEABLE SUPPLY: Performed by: SURGERY

## 2022-10-20 PROCEDURE — 6360000002 HC RX W HCPCS: Performed by: ANESTHESIOLOGY

## 2022-10-20 PROCEDURE — 2720000010 HC SURG SUPPLY STERILE: Performed by: SURGERY

## 2022-10-20 PROCEDURE — 2500000003 HC RX 250 WO HCPCS: Performed by: NURSE ANESTHETIST, CERTIFIED REGISTERED

## 2022-10-20 PROCEDURE — 88302 TISSUE EXAM BY PATHOLOGIST: CPT

## 2022-10-20 PROCEDURE — 3600000004 HC SURGERY LEVEL 4 BASE: Performed by: SURGERY

## 2022-10-20 PROCEDURE — 2580000003 HC RX 258: Performed by: ANESTHESIOLOGY

## 2022-10-20 PROCEDURE — 49585 REPAIR UMBILICAL HERN,5+Y/O,REDUC: CPT | Performed by: SURGERY

## 2022-10-20 PROCEDURE — 7100000010 HC PHASE II RECOVERY - FIRST 15 MIN: Performed by: SURGERY

## 2022-10-20 PROCEDURE — 3700000001 HC ADD 15 MINUTES (ANESTHESIA): Performed by: SURGERY

## 2022-10-20 PROCEDURE — 6360000002 HC RX W HCPCS: Performed by: NURSE ANESTHETIST, CERTIFIED REGISTERED

## 2022-10-20 PROCEDURE — 7100000011 HC PHASE II RECOVERY - ADDTL 15 MIN: Performed by: SURGERY

## 2022-10-20 PROCEDURE — 6360000002 HC RX W HCPCS

## 2022-10-20 PROCEDURE — 3600000014 HC SURGERY LEVEL 4 ADDTL 15MIN: Performed by: SURGERY

## 2022-10-20 DEVICE — 3DMAX LIGHT MESH, 10.3 CM X 15.7 CM (4.1" X 6.2"), RIGHT, LARGE
Type: IMPLANTABLE DEVICE | Status: FUNCTIONAL
Brand: 3DMAX

## 2022-10-20 DEVICE — 3DMAX LIGHT MESH, 10.3 CM X 15.7 CM (4.1" X 6.2"), LEFT, LARGE
Type: IMPLANTABLE DEVICE | Status: FUNCTIONAL
Brand: 3DMAX

## 2022-10-20 RX ORDER — SODIUM CHLORIDE 0.9 % (FLUSH) 0.9 %
5-40 SYRINGE (ML) INJECTION PRN
Status: DISCONTINUED | OUTPATIENT
Start: 2022-10-20 | End: 2022-10-20 | Stop reason: HOSPADM

## 2022-10-20 RX ORDER — FENTANYL CITRATE 50 UG/ML
INJECTION, SOLUTION INTRAMUSCULAR; INTRAVENOUS PRN
Status: DISCONTINUED | OUTPATIENT
Start: 2022-10-20 | End: 2022-10-20 | Stop reason: SDUPTHER

## 2022-10-20 RX ORDER — MIDAZOLAM HYDROCHLORIDE 1 MG/ML
INJECTION INTRAMUSCULAR; INTRAVENOUS PRN
Status: DISCONTINUED | OUTPATIENT
Start: 2022-10-20 | End: 2022-10-20 | Stop reason: SDUPTHER

## 2022-10-20 RX ORDER — DEXAMETHASONE SODIUM PHOSPHATE 4 MG/ML
INJECTION, SOLUTION INTRA-ARTICULAR; INTRALESIONAL; INTRAMUSCULAR; INTRAVENOUS; SOFT TISSUE PRN
Status: DISCONTINUED | OUTPATIENT
Start: 2022-10-20 | End: 2022-10-20 | Stop reason: SDUPTHER

## 2022-10-20 RX ORDER — LIDOCAINE HYDROCHLORIDE 20 MG/ML
INJECTION, SOLUTION INTRAVENOUS PRN
Status: DISCONTINUED | OUTPATIENT
Start: 2022-10-20 | End: 2022-10-20 | Stop reason: SDUPTHER

## 2022-10-20 RX ORDER — SODIUM CHLORIDE 9 MG/ML
INJECTION, SOLUTION INTRAVENOUS PRN
Status: DISCONTINUED | OUTPATIENT
Start: 2022-10-20 | End: 2022-10-20 | Stop reason: HOSPADM

## 2022-10-20 RX ORDER — SODIUM CHLORIDE 9 MG/ML
INJECTION, SOLUTION INTRAVENOUS CONTINUOUS
Status: DISCONTINUED | OUTPATIENT
Start: 2022-10-20 | End: 2022-10-20 | Stop reason: HOSPADM

## 2022-10-20 RX ORDER — MEPERIDINE HYDROCHLORIDE 25 MG/ML
12.5 INJECTION INTRAMUSCULAR; INTRAVENOUS; SUBCUTANEOUS EVERY 5 MIN PRN
Status: DISCONTINUED | OUTPATIENT
Start: 2022-10-20 | End: 2022-10-20 | Stop reason: HOSPADM

## 2022-10-20 RX ORDER — OXYCODONE HYDROCHLORIDE 5 MG/1
10 TABLET ORAL PRN
Status: DISCONTINUED | OUTPATIENT
Start: 2022-10-20 | End: 2022-10-20 | Stop reason: HOSPADM

## 2022-10-20 RX ORDER — ROCURONIUM BROMIDE 10 MG/ML
INJECTION, SOLUTION INTRAVENOUS PRN
Status: DISCONTINUED | OUTPATIENT
Start: 2022-10-20 | End: 2022-10-20 | Stop reason: SDUPTHER

## 2022-10-20 RX ORDER — PROPOFOL 10 MG/ML
INJECTION, EMULSION INTRAVENOUS PRN
Status: DISCONTINUED | OUTPATIENT
Start: 2022-10-20 | End: 2022-10-20 | Stop reason: SDUPTHER

## 2022-10-20 RX ORDER — ONDANSETRON 2 MG/ML
INJECTION INTRAMUSCULAR; INTRAVENOUS PRN
Status: DISCONTINUED | OUTPATIENT
Start: 2022-10-20 | End: 2022-10-20 | Stop reason: SDUPTHER

## 2022-10-20 RX ORDER — SODIUM CHLORIDE 0.9 % (FLUSH) 0.9 %
5-40 SYRINGE (ML) INJECTION EVERY 12 HOURS SCHEDULED
Status: DISCONTINUED | OUTPATIENT
Start: 2022-10-20 | End: 2022-10-20 | Stop reason: HOSPADM

## 2022-10-20 RX ORDER — ONDANSETRON 2 MG/ML
4 INJECTION INTRAMUSCULAR; INTRAVENOUS ONCE
Status: COMPLETED | OUTPATIENT
Start: 2022-10-20 | End: 2022-10-20

## 2022-10-20 RX ORDER — METHOCARBAMOL 500 MG/1
500 TABLET, FILM COATED ORAL 4 TIMES DAILY
Qty: 40 TABLET | Refills: 0 | Status: SHIPPED | OUTPATIENT
Start: 2022-10-20 | End: 2022-10-30

## 2022-10-20 RX ORDER — OXYCODONE HYDROCHLORIDE 5 MG/1
5 TABLET ORAL PRN
Status: DISCONTINUED | OUTPATIENT
Start: 2022-10-20 | End: 2022-10-20 | Stop reason: HOSPADM

## 2022-10-20 RX ORDER — HYDROCODONE BITARTRATE AND ACETAMINOPHEN 5; 325 MG/1; MG/1
1 TABLET ORAL EVERY 6 HOURS PRN
Qty: 10 TABLET | Refills: 0 | Status: SHIPPED | OUTPATIENT
Start: 2022-10-20 | End: 2022-10-23

## 2022-10-20 RX ORDER — FENTANYL CITRATE 50 UG/ML
25 INJECTION, SOLUTION INTRAMUSCULAR; INTRAVENOUS EVERY 5 MIN PRN
Status: DISCONTINUED | OUTPATIENT
Start: 2022-10-20 | End: 2022-10-20 | Stop reason: HOSPADM

## 2022-10-20 RX ORDER — ONDANSETRON 2 MG/ML
INJECTION INTRAMUSCULAR; INTRAVENOUS
Status: COMPLETED
Start: 2022-10-20 | End: 2022-10-20

## 2022-10-20 RX ADMIN — PHENYLEPHRINE HYDROCHLORIDE 100 MCG: 10 INJECTION INTRAVENOUS at 11:12

## 2022-10-20 RX ADMIN — ONDANSETRON 4 MG: 2 INJECTION INTRAMUSCULAR; INTRAVENOUS at 12:15

## 2022-10-20 RX ADMIN — FENTANYL CITRATE 100 MCG: 50 INJECTION, SOLUTION INTRAMUSCULAR; INTRAVENOUS at 10:29

## 2022-10-20 RX ADMIN — LIDOCAINE HYDROCHLORIDE 100 MG: 20 INJECTION, SOLUTION INTRAVENOUS at 10:29

## 2022-10-20 RX ADMIN — FENTANYL CITRATE 50 MCG: 50 INJECTION, SOLUTION INTRAMUSCULAR; INTRAVENOUS at 11:30

## 2022-10-20 RX ADMIN — ROCURONIUM BROMIDE 50 MG: 10 INJECTION, SOLUTION INTRAVENOUS at 10:29

## 2022-10-20 RX ADMIN — FENTANYL CITRATE 50 MCG: 50 INJECTION, SOLUTION INTRAMUSCULAR; INTRAVENOUS at 11:37

## 2022-10-20 RX ADMIN — CEFAZOLIN 3000 MG: 10 INJECTION, POWDER, FOR SOLUTION INTRAVENOUS at 10:32

## 2022-10-20 RX ADMIN — MIDAZOLAM 2 MG: 1 INJECTION INTRAMUSCULAR; INTRAVENOUS at 10:21

## 2022-10-20 RX ADMIN — SODIUM CHLORIDE: 900 INJECTION, SOLUTION INTRAVENOUS at 09:34

## 2022-10-20 RX ADMIN — PROPOFOL 150 MG: 10 INJECTION, EMULSION INTRAVENOUS at 10:29

## 2022-10-20 RX ADMIN — ONDANSETRON 4 MG: 2 INJECTION INTRAMUSCULAR; INTRAVENOUS at 11:13

## 2022-10-20 RX ADMIN — DEXAMETHASONE SODIUM PHOSPHATE 10 MG: 4 INJECTION, SOLUTION INTRAMUSCULAR; INTRAVENOUS at 10:33

## 2022-10-20 RX ADMIN — HYDROMORPHONE HYDROCHLORIDE 0.5 MG: 1 INJECTION, SOLUTION INTRAMUSCULAR; INTRAVENOUS; SUBCUTANEOUS at 11:48

## 2022-10-20 RX ADMIN — HYDROMORPHONE HYDROCHLORIDE 0.5 MG: 1 INJECTION, SOLUTION INTRAMUSCULAR; INTRAVENOUS; SUBCUTANEOUS at 11:53

## 2022-10-20 RX ADMIN — SUGAMMADEX 300 MG: 100 INJECTION, SOLUTION INTRAVENOUS at 11:27

## 2022-10-20 RX ADMIN — FENTANYL CITRATE 50 MCG: 50 INJECTION, SOLUTION INTRAMUSCULAR; INTRAVENOUS at 11:33

## 2022-10-20 ASSESSMENT — PAIN - FUNCTIONAL ASSESSMENT: PAIN_FUNCTIONAL_ASSESSMENT: 0-10

## 2022-10-20 ASSESSMENT — PAIN DESCRIPTION - LOCATION: LOCATION: ABDOMEN

## 2022-10-20 ASSESSMENT — PAIN DESCRIPTION - DESCRIPTORS: DESCRIPTORS: SHARP

## 2022-10-20 ASSESSMENT — PAIN SCALES - GENERAL: PAINLEVEL_OUTOF10: 10

## 2022-10-20 ASSESSMENT — LIFESTYLE VARIABLES: SMOKING_STATUS: 0

## 2022-10-20 NOTE — ANESTHESIA PRE PROCEDURE
Department of Anesthesiology  Preprocedure Note       Name:  Marcel Simpson   Age:  32 y.o.  :  1990                                          MRN:  [id number]         Date:  10/20/2022      Surgeon: Cheng Cm):  Maddy Gu MD    Procedure: Procedure(s): HERNIA VENTRAL REPAIR LAPAROSCOPIC POSSIBLE MESH/LAPAROSCOPIC BILATERAL INGUINAL HERNIA REPAIR POSSIBLE MESH  LAPAROSCOPIC BILATERAL INGUINAL HERNIA REPAIR POSSIBLE MESH    Medications prior to admission:   Prior to Admission medications    Medication Sig Start Date End Date Taking? Authorizing Provider   folic acid (FOLVITE) 1 MG tablet Take 1 tablet by mouth once daily 10/19/22   Moody Irizarry,    Vitamin D, Cholecalciferol, 50 MCG ( UT) CAPS Take 1 capsule by mouth once daily 10/19/22   Samir Ramos DO   metoprolol succinate (TOPROL XL) 25 MG extended release tablet Take 0.5 tablets by mouth nightly 10/19/22   Samir Ramos DO   guaiFENesin (MUCINEX) 600 MG extended release tablet Take 1 tablet by mouth 2 times daily for 15 days 10/7/22 10/22/22  Moody Ramos DO   gabapentin (NEURONTIN) 100 MG capsule Take 2 capsules by mouth nightly as needed (restless legs/leg pain) for up to 360 days. 22  LevoELADIO Llanos CNP   Rimegepant Sulfate 75 MG TBDP Take 75 mg by mouth every other day No more than 1 tab in 24 hours; ok for additional dose on off days PRN breakthrough headache 22   LevoELADIO Llanos CNP   atorvastatin (LIPITOR) 40 MG tablet Take 1 tablet by mouth in the morning.   Patient taking differently: Take 40 mg by mouth nightly 8/10/22   Levora ELADIO Argueta - CNP   pantoprazole (PROTONIX) 40 MG tablet Take 40 mg by mouth daily 22   Historical Provider, MD   albuterol sulfate HFA (VENTOLIN HFA) 108 (90 Base) MCG/ACT inhaler Inhale 2 puffs into the lungs every 6 hours as needed for Wheezing 3/16/22   Moody Ramos DO   apixaban (ELIQUIS) 5 MG TABS tablet Take 5 mg by mouth 2 times daily    Historical Provider, MD   clonazePAM (KLONOPIN) 1 MG tablet Take 1 mg by mouth 2 times daily as needed. Historical Provider, MD   Handicap Placard [facility] by Does not apply route Expires in 1 year 12/10/21   Justyna Ramos DO   diclofenac sodium (VOLTAREN) 1 % GEL Apply 2 g topically 4 times daily 12/10/21   Justyna Ramos DO   pyridostigmine (MESTINON) 60 MG tablet Take 1 tablet by mouth 3 times daily 8/28/17   Justyna Ramos DO   citalopram (CELEXA) 20 MG tablet Take 30 mg by mouth daily     Historical Provider, MD       Current medications:    Current Facility-Administered Medications   Medication Dose Route Frequency Provider Last Rate Last Admin    sodium chloride flush 0.9 % injection 5-40 mL  5-40 mL IntraVENous 2 times per day Florence Singh MD        sodium chloride flush 0.9 % injection 5-40 mL  5-40 mL IntraVENous PRN Florence Singh MD        0.9 % sodium chloride infusion   IntraVENous PRN Florence Singh MD        ceFAZolin (ANCEF) 3,000 mg in dextrose 5 % 100 mL IVPB  3,000 mg IntraVENous On Call to Vamsi Vogel MD        0.9 % sodium chloride infusion   IntraVENous Continuous Marcella Vital MD 10 mL/hr at 10/20/22 0934 New Bag at 10/20/22 0934       Allergies: Allergies   Allergen Reactions    Latex Dermatitis       Problem List:    Patient Active Problem List   Diagnosis Code    POTS (postural orthostatic tachycardia syndrome) G90. A    Hyperlipidemia E78.5    Factor V deficiency ([facility] D68.2    Mild intermittent asthma without complication E48.88    Anxiety F41.9    Hashimoto's thyroiditis E06.3    Lumbar radiculopathy M54.16    Morbid obesity (HCC) E66.01    Personal history of stroke with residual effects I69.30    Chronic migraine with aura G43. 109    Insulin controlled gestational diabetes mellitus (GDM) in second trimester O24.414    Type 2 diabetes mellitus without complication, without long-term current use of insulin ([facility]) E11.9    Non-recurrent unilateral inguinal hernia without obstruction or gangrene N08.69    Umbilical hernia without obstruction and without gangrene K42.9    Bilateral inguinal hernia K40.20       Past Medical History:        Diagnosis Date    Anemia     Anxiety     Asthma     Blood clot embolism during pregnancy, antepartum     Cerebrovascular accident (CVA) due to vascular occlusion ([facility]      delivery delivered 2021    Chronic migraine     Coagulopathy ([facility])     Diabetes mellitus ([facility]     gestational - no current issues as of 10-17-22    DVT of leg (deep venous thrombosis) ([facility])     left    DVT of upper extremity (deep vein thrombosis) ([facility])     Left X 2    Factor 5 Leiden mutation, heterozygous [facility]     Heterozygous for BERT-1 4G allele     Hyperlipidemia     Low back pain     MTHFR mutation     Oligohydramnios in third trimester 2021    POTS (postural orthostatic tachycardia syndrome)     Milvia Milton , neurology    Shingles     x2 - age 16, and age 29    Thrombophilia during pregnancy in third trimester ([facility] 2021    Thyroid disease        Past Surgical History:        Procedure Laterality Date     SECTION N/A 2021     SECTION performed by [facility]ade [facility] at [facility] OR    DILATION AND CURETTAGE OF UTERUS      FRACTURE SURGERY      10    NERVE BLOCK Left 2018    transforaminal epidural #1    VT ZEHRA NOSE/CLEFT LIP/TIP Left 2018    LEFT L5-S1 TRANSFORAMINAL EPIDURAL STEROID INJECTION #1 performed by Mike Farooq DO at [place] [address] Right     may       Social History:    Social History     Tobacco Use    Smoking status: Former     Packs/day: 0.50     Types: Cigarettes     Start date:      Quit date: 2021     Years since quittin.3    Smokeless tobacco: Never   Substance Use Topics    Alcohol use: Not Currently Counseling given: Not Answered      Vital Signs (Current):   Vitals:    10/20/22 0917   BP: (!) 119/59   Pulse: 84   Resp: 18   Temp: 97 °F (36.1 °C)   TempSrc: Infrared   SpO2: 97%   Weight: 275 lb (124.7 kg)   Height: 5' 6\" (1.676 m)                                              BP Readings from Last 3 Encounters:   10/20/22 (!) 119/59   10/17/22 109/65   10/12/22 125/78       NPO Status: Time of last liquid consumption: 2330                        Time of last solid consumption: 2330                        Date of last liquid consumption: 10/19/22                        Date of last solid food consumption: 10/19/22    BMI:   Wt Readings from Last 3 Encounters:   10/20/22 275 lb (124.7 kg)   10/17/22 275 lb (124.7 kg)   10/12/22 279 lb (126.6 kg)     Body mass index is 44.39 kg/m². CBC:   Lab Results   Component Value Date/Time    WBC 6.2 10/17/2022 10:25 AM    RBC 4.67 10/17/2022 10:25 AM    HGB 13.8 10/17/2022 10:25 AM    HCT 41.7 10/17/2022 10:25 AM    MCV 89.3 10/17/2022 10:25 AM    RDW 12.8 10/17/2022 10:25 AM     10/17/2022 10:25 AM       CMP:   Lab Results   Component Value Date/Time     10/17/2022 10:25 AM    K 4.4 10/17/2022 10:25 AM     10/17/2022 10:25 AM    CO2 22 10/17/2022 10:25 AM    BUN 9 10/17/2022 10:25 AM    CREATININE 0.8 10/17/2022 10:25 AM    GFRAA >60 10/17/2022 10:25 AM    LABGLOM >60 10/17/2022 10:25 AM    GLUCOSE 105 10/17/2022 10:25 AM    PROT 8.3 04/27/2022 08:34 AM    CALCIUM 10.1 10/17/2022 10:25 AM    BILITOT 0.4 04/27/2022 08:34 AM    ALKPHOS 79 04/27/2022 08:34 AM    AST 30 04/27/2022 08:34 AM    ALT 47 04/27/2022 08:34 AM       POC Tests: No results for input(s): POCGLU, POCNA, POCK, POCCL, POCBUN, POCHEMO, POCHCT in the last 72 hours.     Coags:   Lab Results   Component Value Date/Time    PROTIME 11.5 06/25/2017 09:40 AM    INR 1.1 06/25/2017 09:40 AM    APTT 30.3 06/25/2017 09:40 AM       HCG (If Applicable):   Lab Results   Component Value Date PREGTESTUR negative 06/26/2018        ABGs: No results found for: PHART, PO2ART, HUT6YES, WKA3XSY, BEART, P5CUDBVH     Type & Screen (If Applicable):  No results found for: LABABO, LABRH    Drug/Infectious Status (If Applicable):  No results found for: HIV, HEPCAB    COVID-19 Screening (If Applicable):   Lab Results   Component Value Date/Time    COVID19 Positive 01/27/2022 01:23 PM    COVID19 Not Detected 12/23/2021 01:30 PM           Anesthesia Evaluation  Patient summary reviewed no history of anesthetic complications:   Airway: Mallampati: II  TM distance: >3 FB   Neck ROM: full  Mouth opening: > = 3 FB   Dental: normal exam         Pulmonary: breath sounds clear to auscultation  (+) asthma: allergic asthma,                            Cardiovascular:Negative CV ROS            Rhythm: regular             Beta Blocker:  Dose within 24 Hrs      ROS comment: POTS     Neuro/Psych:   (+) CVA: residual symptoms, neuromuscular disease:, headaches: migraine headaches,              ROS comment: LUMBAR RADICULOPATHY   GI/Hepatic/Renal:   (+) morbid obesity          Endo/Other:    (+) DiabetesType II DM, using insulin, hypothyroidism, blood dyscrasia::., .                  ROS comment: HASHIMOTO'S  Abdominal:             Vascular: negative vascular ROS. Other Findings:             Anesthesia Plan      general     ASA 3       Induction: intravenous. MIPS: Postoperative opioids intended and Prophylactic antiemetics administered. Anesthetic plan and risks discussed with patient. Plan discussed with CRNA. Amy Rogers,    10/20/2022    Patient seen and evaluated, anesthesia plan of care reviewed.  ELADIO Moon - CRNA

## 2022-10-20 NOTE — DISCHARGE INSTRUCTIONS
4 hours. After the first 36hours only take the pills as needed and stop them as soon as possible. Pain meds cause constipation so pay close attention to the \"bowels\" topic above. Keep incisions clean and dry. Vicodin/Percocet and ibuprofen for pain as prescribed. Okay to resume anticoagulant medication after 24hrs. Do not exceed 4000mg of Tylenol/Acetaminophen per day. Vicodin/Norco/Percocet contain acetaminophen. Do not take additional amounts of Tylenol if you are taking these medications. You are likely to have pain for the next few days. You may also feel like you have the flu, and you may have a low fever and feel tired and nauseated. This is common. You should feel better after a few days and will probably feel much better in 7 days. For several weeks you may feel twinges or pulling in the hernia repair when you move. This care sheet gives you a general idea about how long it will take for you to recover. But each person recovers at a different pace. Follow the steps below to get better as quickly as possible. Activity  Rest when you feel tired. Getting enough sleep will help you recover. Try to walk each day. Start by walking a little more than you did the day before. Bit by bit, increase the amount you walk. Walking boosts blood flow and helps prevent pneumonia and constipation. Avoid strenuous activities, such as biking, jogging, weight lifting, or aerobic exercise, until your doctor says it is okay. Avoid lifting anything that would make you strain. This may include heavy grocery bags and milk containers, a heavy briefcase or backpack, cat litter or dog food bags, a vacuum , or a child. You may drive when you are no longer taking pain medicine and can quickly move your foot from the gas pedal to the brake. You must also be able to sit comfortably for a long period of time, even if you do not plan to go far. You might get caught in traffic.   Most people are able to return to work within 1 to 2 weeks after surgery. You may shower 24 to 48 hours after surgery, if your doctor okays it. Pat the cut (incision) dry. Do not take a bath for the first 2 weeks, or until your doctor tells you it is okay. Your doctor will tell you when you can have sex again. Diet  You can eat your normal diet. If your stomach is upset, try bland, low-fat foods like plain rice, broiled chicken, toast, and yogurt. Drink plenty of fluids (unless your doctor tells you not to). You may notice that your bowel movements are not regular right after your surgery. This is common. Avoid constipation and straining with bowel movements. You may want to take a fiber supplement every day. If you have not had a bowel movement after a couple of days, ask your doctor about taking a mild laxative. Medicines  Be safe with medicines. Take pain medicines exactly as directed. If the doctor gave you a prescription medicine for pain, take it as prescribed. If you are not taking a prescription pain medicine, take an over-the-counter medicine such as acetaminophen (Tylenol), ibuprofen (Advil, Motrin), or naproxen (Aleve). Read and follow all instructions on the label. Do not take two or more pain medicines at the same time unless the doctor told you to. Many pain medicines have acetaminophen, which is Tylenol. Too much acetaminophen (Tylenol) can be harmful. If your doctor prescribed antibiotics, take them as directed. Do not stop taking them just because you feel better. You need to take the full course of antibiotics. If you think your pain medicine is making you sick to your stomach: Take your medicine after meals (unless your doctor has told you not to). Ask your doctor for a different pain medicine. Incision care  If you have strips of tape on the cut (incision) the doctor made, leave the tape on for a week or until it falls off.   If you have staples closing the cut, you will need to visit your doctor in 1 to 2

## 2022-10-20 NOTE — ANESTHESIA POSTPROCEDURE EVALUATION
Department of Anesthesiology  Postprocedure Note    Patient: Bird Pearl  MRN: [id number]  YOB: 1990  Date of evaluation: 10/20/2022      Procedure Summary     Date: 10/20/22 Room / Location: [address] [place] [address] 03 / [facility]    Anesthesia Start: 1020 Anesthesia Stop: 6684    Procedures:        HERNIA VENTRAL REPAIR LAPAROSCOPIC POSSIBLE MESH/LAPAROSCOPIC BILATERAL INGUINAL HERNIA REPAIR POSSIBLE MESH      LAPAROSCOPIC BILATERAL INGUINAL HERNIA REPAIR POSSIBLE MESH (Bilateral) Diagnosis:       Ventral hernia      Bilateral inguinal hernia      (Ventral hernia [K43.9])      (Bilateral inguinal hernia [K40.20])    Surgeons: Nella Gorman MD Responsible Provider: Dorothy Buenrostro DO    Anesthesia Type: General ASA Status: 3          Anesthesia Type: General    Veronica Phase I: Veronica Score: 8    Veronica Phase II: Veronica Score: 10      Anesthesia Post Evaluation    Patient location during evaluation: PACU  Patient participation: complete - patient participated  Level of consciousness: awake and alert  Airway patency: patent  Nausea & Vomiting: no nausea and no vomiting  Complications: no  Cardiovascular status: hemodynamically stable  Respiratory status: acceptable  Hydration status: euvolemic

## 2022-10-20 NOTE — H&P
General Surgery History and Physical  [facility]    Patient's Name/Date of Birth: Patsy Moore / 1990    Date: 10/20/22    Surgeon: Fany Rahman MD    PCP: Joni Loco DO     Chief Complaint: umbilical bulge, groin pain    HPI:   Patsy Moore is a 32 y.o. female who presents for evaluation of worsening abdominal discomfort and umbilicus and lower abdomen. Timing is constant, radiation to abdominal wall, alleviated by rest and started several months ago and severity is 5/10. States pain has been worsening, no symptoms of bowel obstruction, nausea, vomiting, fever, chills, abdominal pain. States umbilical bulge protrudes with activity, it is reducible.     Patient Active Problem List   Diagnosis    POTS (postural orthostatic tachycardia syndrome)    Hyperlipidemia    Factor V deficiency ([facility])    Mild intermittent asthma without complication    Anxiety    Hashimoto's thyroiditis    Lumbar radiculopathy    Morbid obesity (HCC)    Personal history of stroke with residual effects    Chronic migraine with aura    Insulin controlled gestational diabetes mellitus (GDM) in second trimester    Type 2 diabetes mellitus without complication, without long-term current use of insulin ([facility])    Non-recurrent unilateral inguinal hernia without obstruction or gangrene    Umbilical hernia without obstruction and without gangrene    Bilateral inguinal hernia       Past Medical History:   Diagnosis Date    Anemia     Anxiety     Asthma     Blood clot embolism during pregnancy, antepartum     Cerebrovascular accident (CVA) due to vascular occlusion ([address]      delivery delivered 2021    Chronic migraine     Coagulopathy ([address]     Diabetes mellitus ([address]     gestational - no current issues as of 10-17-22    DVT of leg (deep venous thrombosis) ([facility])     left    DVT of upper extremity (deep vein thrombosis) ([facility])     Left X 2    Factor 5 Leiden mutation, heterozygous ([address] Heterozygous for BERT-1 4G allele     Hyperlipidemia     Low back pain     MTHFR mutation     Oligohydramnios in third trimester 2021    POTS (postural orthostatic tachycardia syndrome)     Aleisha Wilks , neurology    Shingles     x2 - age 16, and age 29    Thrombophilia during pregnancy in third trimester ([facility] 2021    Thyroid disease        Past Surgical History:   Procedure Laterality Date     SECTION N/A 2021     SECTION performed by David Collado MD at [facility] OR    DILATION AND CURETTAGE OF UTERUS      FRACTURE SURGERY      10    NERVE BLOCK Left 2018    transforaminal epidural #1    IA ZEHRA NOSE/CLEFT LIP/TIP Left 2018    LEFT L5-S1 TRANSFORAMINAL EPIDURAL STEROID INJECTION #1 performed by Rhonda Ramon DO at [place] [id number] Right     may       Allergies   Allergen Reactions    Latex Dermatitis       The patient has a family history that is negative for severe cardiovascular or respiratory issues, negative for reaction to anesthesia. Patient does not report any history of gastrointestinal cancer in the mother or father    Time spent reviewing past medical, surgical, social and family history, vitals, nursing assessment and images. No changes from above documented history.     Social History     Socioeconomic History    Marital status:      Spouse name: Not on file    Number of children: 0    Years of education: 12    Highest education level: Associate degree: occupational, technical, or vocational program   Occupational History    Not on file   Tobacco Use    Smoking status: Former     Packs/day: 0.50     Types: Cigarettes     Start date:      Quit date: 2021     Years since quittin.3    Smokeless tobacco: Never   Vaping Use    Vaping Use: Former    Quit date: 2019   Substance and Sexual Activity    Alcohol use: Not Currently    Drug use: No    Sexual activity: Yes     Partners: Male     Birth control/protection: Condom   Other Topics Concern    Not on file   Social History Narrative    ** Merged History Encounter **    9/27/19: Patient has no contact with mother, she does communicate with father and step-mother in [place] (has not seen in 5 years)    Patient used Vaping to assist with smoking cessation. Patient no longer uses Vaping. Social Determinants of Health     Financial Resource Strain: Low Risk     Difficulty of Paying Living Expenses: Not hard at all   Food Insecurity: No Food Insecurity    Worried About Running Out of Food in the Last Year: Never true    Ran Out of Food in the Last Year: Never true   Transportation Needs: Not on file   Physical Activity: Not on file   Stress: Not on file   Social Connections: Not on file   Intimate Partner Violence: Not on file   Housing Stability: Not on file         A complete 10 system review was performed and are otherwise negative unless mentioned in the above HPI. Specific negatives are listed below but may not include all those reviewed. General ROS: negative obtundation, AMS  ENT ROS: negative rhinorrhea, epistaxis  Allergy and Immunology ROS: negative itchy/watery eyes or nasal congestion  Hematological and Lymphatic ROS: negative spontaneous bleeding or bruising  Endocrine ROS: negative  lethargy, mood swings, palpitations or polydipsia/polyuria  Respiratory ROS: negative sputum changes, stridor, tachypnea or wheezing  Cardiovascular ROS: negative for - loss of consciousness, murmur or orthopnea  Gastrointestinal ROS: negative for - hematochezia or hematemesis  Genito-Urinary ROS: negative for -  genital discharge or hematuria  Musculoskeletal ROS: negative for - focal weakness, gangrene  Psych/Neuro ROS: negative for - visual or auditory hallucinations, suicidal ideation    Physical exam:   There were no vitals taken for this visit.   General appearance:  NAD, appears stated age  Head: NCAT, PERRLA, EOMI, red conjunctiva  Neck: supple, no masses, trachea midline  Lungs: Equal chest rise bilateral, no retractions, no wheezing  Heart: Reg rate  Abdomen: soft, nontender nondistended. Reducible umbilical hernia  Skin; warm and dry, no cyanosis  Gu: no cva tenderness, palpable inguinal hernia bilaterally, reducible  Extremities: atraumatic, no focal motor deficits, no open wounds  Psych: No tremor, visual hallucinations        Radiology: I reviewed relevant abdominal imaging from this admission and that available in the EMR including CT abd/pel. My assessment is bilateral fat containing inguinal hernias, umbilical hernia      Assessment:  Ramon Knowles is a 32 y.o. female with umbilical hernia and bilateral inguinal hernias, nonrecurrent  Patient Active Problem List   Diagnosis    POTS (postural orthostatic tachycardia syndrome)    Hyperlipidemia    Factor V deficiency (HCC)    Mild intermittent asthma without complication    Anxiety    Hashimoto's thyroiditis    Lumbar radiculopathy    Morbid obesity ([address]    Personal history of stroke with residual effects    Chronic migraine with aura    Insulin controlled gestational diabetes mellitus (GDM) in second trimester    Type 2 diabetes mellitus without complication, without long-term current use of insulin (HCC)    Non-recurrent unilateral inguinal hernia without obstruction or gangrene    Umbilical hernia without obstruction and without gangrene    Bilateral inguinal hernia         Plan:  I discussed the above available imaging in the electronic medical record and reviewed images independently as well as in the office evaluation room with the patient when available. I selected relevant images and included them in the above note if they were available  I discussed at length inguinal anatomy, blood supply, neurovascular anatomy and options for surgical repair.   Discussed the incidence of incarceration and strangulation or emergency evaluation requiring surgery for inguinal hernia  I reviewed urgent care and medical specialist notes in the electronic medical record relating to the above complaints as well as any underlying medical conditions that may contribute to or prevent surgical intervention  I discussed nonoperative management, we also discussed reasons to seek urgent attention that are related to incarceration strangulation obstruction or ongoing problems with worsening obstipation  We discussed the risks of chronic groin pain associated with inguinal hernia surgery with or without mesh placement  We discussed surgical options excluding the use of mesh and locations where this may be performed and indications where this may be completed  I recommend proceeding to OR for laparoscopic robot assisted umbilical and bilateral inguinal hernia repair with mesh  This will be scheduled today for future date at the patient's request  Discussed the risk, benefits and alternatives of surgery including wound infections, bleeding, scar, recurrent hernia formation, mesh infection and migration, chronic groin pain, nerve injury, testicle injury, repeat procedures and the risks of general anesthetic including MI, CVA, sudden death or reactions to anesthetic medications. The patient understands the risks and alternatives and the possibility of converting to an open procedure. All questions were answered to the patient's satisfaction and they freely signed the consent.            Yumi Kraft MD

## 2022-10-26 ENCOUNTER — TELEPHONE (OUTPATIENT)
Dept: FAMILY MEDICINE CLINIC | Age: 32
End: 2022-10-26

## 2022-10-26 NOTE — TELEPHONE ENCOUNTER
Pt called called in asking if she need to start taking metformin again. If so pt states that she is going to need a new RX sent to the Pharmacy. Please advise.     Electronically signed by Edu Dockery MA on 10/26/22 at 10:42 AM EDT

## 2022-11-02 ENCOUNTER — OFFICE VISIT (OUTPATIENT)
Dept: SURGERY | Age: 32
End: 2022-11-02

## 2022-11-02 VITALS
HEIGHT: 66 IN | SYSTOLIC BLOOD PRESSURE: 137 MMHG | WEIGHT: 275 LBS | DIASTOLIC BLOOD PRESSURE: 83 MMHG | TEMPERATURE: 97 F | HEART RATE: 84 BPM | BODY MASS INDEX: 44.2 KG/M2

## 2022-11-02 DIAGNOSIS — Z09 POSTOPERATIVE EXAMINATION: Primary | ICD-10-CM

## 2022-11-02 PROCEDURE — 99024 POSTOP FOLLOW-UP VISIT: CPT | Performed by: SURGERY

## 2022-11-02 RX ORDER — MEDROXYPROGESTERONE ACETATE 150 MG/ML
INJECTION, SUSPENSION INTRAMUSCULAR
COMMUNITY
Start: 2022-09-26

## 2022-11-06 NOTE — PROGRESS NOTES
General Surgery Office Note  [facility]  Consandre P. Aleida Harris MD    Patient's Name/Date of Birth: Jane Lindsey / 1990    Date: November 6, 2022     Surgeon: Aleida Harris MD    Chief Complaint:   Chief Complaint   Patient presents with    Post-Op Check    Groin Pain     Left        Patient Active Problem List   Diagnosis    POTS (postural orthostatic tachycardia syndrome)    Hyperlipidemia    Factor V deficiency (HCC)    Mild intermittent asthma without complication    Anxiety    Hashimoto's thyroiditis    Lumbar radiculopathy    Morbid obesity ([address]    Personal history of stroke with residual effects    Chronic migraine with aura    Insulin controlled gestational diabetes mellitus (GDM) in second trimester    Type 2 diabetes mellitus without complication, without long-term current use of insulin ([address]    Non-recurrent unilateral inguinal hernia without obstruction or gangrene    Umbilical hernia without obstruction and without gangrene    Bilateral inguinal hernia       Subjective: Doing well. Pain from before the surgery has resolved. Has mild discomfort in the groin at the surgical site. Objective:  /83   Pulse 84   Temp 97 °F (36.1 °C) (Temporal)   Ht 5' 6\" (1.676 m)   Wt 275 lb (124.7 kg)   BMI 44.39 kg/m²   Labs:  No results for input(s): WBC, HGB, HCT in the last 72 hours. Invalid input(s): PLR  Lab Results   Component Value Date    CREATININE 0.8 10/17/2022    BUN 9 10/17/2022     10/17/2022    K 4.4 10/17/2022     10/17/2022    CO2 22 10/17/2022     No results for input(s): LIPASE, AMYLASE in the last 72 hours.     General appearance: AA, NAD  HEENT: NCAT, PERRLA, EOMI  Lungs: Clear, equal rise bilateral  Heart: Reg  Abdomen: soft, nondistended, nontender, incisions well healed, no signs of infection, no cellulitis, no hematoma  Skin: No lesions, incisions well healed  Psych: No distress, conversive, no hallucinations  : No ulcers or lesions  Rectal: No bleeding    A complete 10 system review was performed and are otherwise negative unless mentioned in the above HPI. Specific negatives are listed below but may not include all those reviewed. General ROS: negative obtundation, AMS  ENT ROS: negative rhinorrhea, epistaxis  Allergy and Immunology ROS: negative itchy/watery eyes or nasal congestion  Hematological and Lymphatic ROS: negative spontaneous bleeding or bruising  Endocrine ROS: negative  lethargy, mood swings, palpitations or polydipsia/polyuria  Respiratory ROS: negative sputum changes, stridor, tachypnea or wheezing  Cardiovascular ROS: negative for - loss of consciousness, murmur or orthopnea  Gastrointestinal ROS: negative for - hematochezia or hematemesis  Genito-Urinary ROS: negative for -  genital discharge or hematuria  Musculoskeletal ROS: negative for - focal weakness, gangrene  Psych/Neuro ROS: negative for - visual or auditory hallucinations, suicidal ideation      Time spent reviewing past medical, surgical, social and family history, vitals, nursing assessment and images.     Imaging:n/a    Pathology: n/a    Assessment/Plan:  Alvarez Alcantar is a 32 y.o. female 2 weeks status post laparoscopic bilateral inguinal hernia repair, ventral hernia repair, doing well    No heavy lifting for 6 weeks total from the time of surgery  Follow-up as needed    Physician Signature: Electronically signed by Dr. Himanshu Beatty

## 2023-01-18 ENCOUNTER — HOSPITAL ENCOUNTER (OUTPATIENT)
Age: 33
Discharge: HOME | End: 2023-01-18
Payer: COMMERCIAL

## 2023-01-18 DIAGNOSIS — D68.2 FACTOR V DEFICIENCY (HCC): ICD-10-CM

## 2023-01-18 DIAGNOSIS — K43.9 VENTRAL HERNIA WITHOUT OBSTRUCTION OR GANGRENE: ICD-10-CM

## 2023-01-18 DIAGNOSIS — G90.A POTS (POSTURAL ORTHOSTATIC TACHYCARDIA SYNDROME): ICD-10-CM

## 2023-01-18 DIAGNOSIS — E11.9 TYPE 2 DIABETES MELLITUS WITHOUT COMPLICATION, WITHOUT LONG-TERM CURRENT USE OF INSULIN (HCC): ICD-10-CM

## 2023-01-18 DIAGNOSIS — E78.5 DYSLIPIDEMIA: ICD-10-CM

## 2023-01-18 DIAGNOSIS — R53.83 OTHER FATIGUE: ICD-10-CM

## 2023-01-18 LAB
ALBUMIN SERPL-MCNC: 4.2 G/DL (ref 3.5–5.2)
ALP BLD-CCNC: 108 U/L (ref 35–104)
ALT SERPL-CCNC: 35 U/L (ref 0–32)
ANION GAP SERPL CALCULATED.3IONS-SCNC: 12 MMOL/L (ref 7–16)
AST SERPL-CCNC: 22 U/L (ref 0–31)
BASOPHILS ABSOLUTE: 0.05 E9/L (ref 0–0.2)
BASOPHILS RELATIVE PERCENT: 0.7 % (ref 0–2)
BILIRUB SERPL-MCNC: 0.5 MG/DL (ref 0–1.2)
BUN BLDV-MCNC: 9 MG/DL (ref 6–20)
CALCIUM SERPL-MCNC: 10 MG/DL (ref 8.6–10.2)
CHLORIDE BLD-SCNC: 103 MMOL/L (ref 98–107)
CHOLESTEROL, TOTAL: 143 MG/DL (ref 0–199)
CO2: 22 MMOL/L (ref 22–29)
CREAT SERPL-MCNC: 0.8 MG/DL (ref 0.5–1)
EOSINOPHILS ABSOLUTE: 0.18 E9/L (ref 0.05–0.5)
EOSINOPHILS RELATIVE PERCENT: 2.5 % (ref 0–6)
GFR SERPL CREATININE-BSD FRML MDRD: >60 ML/MIN/1.73
GLUCOSE BLD-MCNC: 106 MG/DL (ref 74–99)
HBA1C MFR BLD: 5.8 % (ref 4–5.6)
HCT VFR BLD CALC: 44.1 % (ref 34–48)
HDLC SERPL-MCNC: 38 MG/DL
HEMOGLOBIN: 14.5 G/DL (ref 11.5–15.5)
IMMATURE GRANULOCYTES #: 0.03 E9/L
IMMATURE GRANULOCYTES %: 0.4 % (ref 0–5)
LDL CHOLESTEROL CALCULATED: 58 MG/DL (ref 0–99)
LYMPHOCYTES ABSOLUTE: 2.27 E9/L (ref 1.5–4)
LYMPHOCYTES RELATIVE PERCENT: 31.4 % (ref 20–42)
MCH RBC QN AUTO: 30.1 PG (ref 26–35)
MCHC RBC AUTO-ENTMCNC: 32.9 % (ref 32–34.5)
MCV RBC AUTO: 91.5 FL (ref 80–99.9)
MONOCYTES ABSOLUTE: 0.6 E9/L (ref 0.1–0.95)
MONOCYTES RELATIVE PERCENT: 8.3 % (ref 2–12)
NEUTROPHILS ABSOLUTE: 4.1 E9/L (ref 1.8–7.3)
NEUTROPHILS RELATIVE PERCENT: 56.7 % (ref 43–80)
PDW BLD-RTO: 12.4 FL (ref 11.5–15)
PLATELET # BLD: 339 E9/L (ref 130–450)
PMV BLD AUTO: 9.6 FL (ref 7–12)
POTASSIUM SERPL-SCNC: 4.1 MMOL/L (ref 3.5–5)
RBC # BLD: 4.82 E12/L (ref 3.5–5.5)
SODIUM BLD-SCNC: 137 MMOL/L (ref 132–146)
TOTAL PROTEIN: 7.7 G/DL (ref 6.4–8.3)
TRIGL SERPL-MCNC: 236 MG/DL (ref 0–149)
TSH SERPL DL<=0.05 MIU/L-ACNC: 3.49 UIU/ML (ref 0.27–4.2)
URIC ACID, SERUM: 5.8 MG/DL (ref 2.4–5.7)
VLDLC SERPL CALC-MCNC: 47 MG/DL
WBC # BLD: 7.2 E9/L (ref 4.5–11.5)

## 2023-01-18 PROCEDURE — 36415 COLL VENOUS BLD VENIPUNCTURE: CPT

## 2023-01-18 PROCEDURE — 80053 COMPREHEN METABOLIC PANEL: CPT

## 2023-01-18 PROCEDURE — 80061 LIPID PANEL: CPT

## 2023-01-18 PROCEDURE — 84443 ASSAY THYROID STIM HORMONE: CPT

## 2023-01-18 PROCEDURE — 85025 COMPLETE CBC W/AUTO DIFF WBC: CPT

## 2023-01-18 PROCEDURE — 84550 ASSAY OF BLOOD/URIC ACID: CPT

## 2023-01-18 PROCEDURE — 83036 HEMOGLOBIN GLYCOSYLATED A1C: CPT

## 2023-01-20 ENCOUNTER — OFFICE VISIT (OUTPATIENT)
Dept: FAMILY MEDICINE CLINIC | Age: 33
End: 2023-01-20

## 2023-01-20 VITALS
DIASTOLIC BLOOD PRESSURE: 76 MMHG | HEART RATE: 88 BPM | HEIGHT: 66 IN | WEIGHT: 288 LBS | RESPIRATION RATE: 18 BRPM | SYSTOLIC BLOOD PRESSURE: 104 MMHG | BODY MASS INDEX: 46.28 KG/M2 | OXYGEN SATURATION: 96 % | TEMPERATURE: 97.3 F

## 2023-01-20 DIAGNOSIS — D68.2 FACTOR V DEFICIENCY (HCC): ICD-10-CM

## 2023-01-20 DIAGNOSIS — R79.89 LOW VITAMIN D LEVEL: ICD-10-CM

## 2023-01-20 DIAGNOSIS — R73.03 PREDIABETES: ICD-10-CM

## 2023-01-20 DIAGNOSIS — J45.20 MILD INTERMITTENT ASTHMA WITHOUT COMPLICATION: ICD-10-CM

## 2023-01-20 DIAGNOSIS — I63.50 CEREBRAL ARTERY OCCLUSION WITH CEREBRAL INFARCTION (HCC): Chronic | ICD-10-CM

## 2023-01-20 DIAGNOSIS — E66.01 MORBID OBESITY (HCC): ICD-10-CM

## 2023-01-20 DIAGNOSIS — E11.9 TYPE 2 DIABETES MELLITUS WITHOUT COMPLICATION, WITHOUT LONG-TERM CURRENT USE OF INSULIN (HCC): Primary | ICD-10-CM

## 2023-01-20 DIAGNOSIS — G90.A POTS (POSTURAL ORTHOSTATIC TACHYCARDIA SYNDROME): ICD-10-CM

## 2023-01-20 DIAGNOSIS — Z33.3 PREGNANT STATE, GESTATIONAL CARRIER: ICD-10-CM

## 2023-01-20 RX ORDER — CITALOPRAM 20 MG/1
30 TABLET ORAL DAILY
Qty: 90 TABLET | Refills: 1 | Status: SHIPPED | OUTPATIENT
Start: 2023-01-20

## 2023-01-20 RX ORDER — QUETIAPINE FUMARATE 25 MG/1
TABLET, FILM COATED ORAL
COMMUNITY
Start: 2022-12-28

## 2023-01-20 RX ORDER — TIZANIDINE 2 MG/1
TABLET ORAL
COMMUNITY
Start: 2022-12-21

## 2023-01-20 RX ORDER — ALBUTEROL SULFATE 90 UG/1
2 AEROSOL, METERED RESPIRATORY (INHALATION) EVERY 6 HOURS PRN
Qty: 1 EACH | Refills: 5 | Status: SHIPPED | OUTPATIENT
Start: 2023-01-20

## 2023-01-20 RX ORDER — FOLIC ACID 1 MG/1
TABLET ORAL
Qty: 90 TABLET | Refills: 1 | Status: SHIPPED | OUTPATIENT
Start: 2023-01-20

## 2023-01-20 RX ORDER — ATORVASTATIN CALCIUM 40 MG/1
40 TABLET, FILM COATED ORAL NIGHTLY
Qty: 90 TABLET | Refills: 1 | Status: SHIPPED | OUTPATIENT
Start: 2023-01-20

## 2023-01-20 RX ORDER — QUETIAPINE FUMARATE 25 MG/1
TABLET, FILM COATED ORAL
Qty: 60 TABLET | Status: CANCELLED | OUTPATIENT
Start: 2023-01-20

## 2023-01-20 RX ORDER — CLONAZEPAM 1 MG/1
1 TABLET ORAL 2 TIMES DAILY PRN
Qty: 60 TABLET | Status: CANCELLED | OUTPATIENT
Start: 2023-01-20

## 2023-01-20 RX ORDER — MULTIVIT-MIN/IRON/FOLIC ACID/K 18-600-40
CAPSULE ORAL
Qty: 90 CAPSULE | Refills: 0 | Status: SHIPPED | OUTPATIENT
Start: 2023-01-20

## 2023-01-20 RX ORDER — GABAPENTIN 100 MG/1
200 CAPSULE ORAL NIGHTLY PRN
Qty: 180 CAPSULE | Refills: 3 | Status: CANCELLED | OUTPATIENT
Start: 2023-01-20 | End: 2024-01-15

## 2023-01-20 RX ORDER — METOPROLOL SUCCINATE 25 MG/1
12.5 TABLET, EXTENDED RELEASE ORAL NIGHTLY
Qty: 90 TABLET | Refills: 1 | Status: SHIPPED | OUTPATIENT
Start: 2023-01-20

## 2023-01-20 RX ORDER — PANTOPRAZOLE SODIUM 40 MG/1
40 TABLET, DELAYED RELEASE ORAL DAILY
Qty: 90 TABLET | Refills: 1 | Status: SHIPPED | OUTPATIENT
Start: 2023-01-20

## 2023-01-20 RX ORDER — MEDROXYPROGESTERONE ACETATE 150 MG/ML
INJECTION, SUSPENSION INTRAMUSCULAR
Qty: 1 ML | Status: CANCELLED | OUTPATIENT
Start: 2023-01-20

## 2023-01-20 RX ORDER — SEMAGLUTIDE 1.34 MG/ML
0.25 INJECTION, SOLUTION SUBCUTANEOUS WEEKLY
Qty: 3 ADJUSTABLE DOSE PRE-FILLED PEN SYRINGE | Refills: 3 | Status: SHIPPED | OUTPATIENT
Start: 2023-01-20

## 2023-01-20 RX ORDER — TIZANIDINE 2 MG/1
TABLET ORAL
Status: CANCELLED | OUTPATIENT
Start: 2023-01-20

## 2023-01-20 RX ORDER — PYRIDOSTIGMINE BROMIDE 60 MG/1
60 TABLET ORAL 3 TIMES DAILY
Qty: 270 TABLET | Refills: 1 | Status: SHIPPED | OUTPATIENT
Start: 2023-01-20

## 2023-01-20 SDOH — ECONOMIC STABILITY: FOOD INSECURITY: WITHIN THE PAST 12 MONTHS, THE FOOD YOU BOUGHT JUST DIDN'T LAST AND YOU DIDN'T HAVE MONEY TO GET MORE.: NEVER TRUE

## 2023-01-20 SDOH — ECONOMIC STABILITY: FOOD INSECURITY: WITHIN THE PAST 12 MONTHS, YOU WORRIED THAT YOUR FOOD WOULD RUN OUT BEFORE YOU GOT MONEY TO BUY MORE.: NEVER TRUE

## 2023-01-20 ASSESSMENT — PATIENT HEALTH QUESTIONNAIRE - PHQ9
SUM OF ALL RESPONSES TO PHQ QUESTIONS 1-9: 0
SUM OF ALL RESPONSES TO PHQ9 QUESTIONS 1 & 2: 0
1. LITTLE INTEREST OR PLEASURE IN DOING THINGS: 0
SUM OF ALL RESPONSES TO PHQ QUESTIONS 1-9: 0
2. FEELING DOWN, DEPRESSED OR HOPELESS: 0

## 2023-01-20 ASSESSMENT — ENCOUNTER SYMPTOMS
NAUSEA: 0
RHINORRHEA: 0
CHEST TIGHTNESS: 0
BACK PAIN: 1
ALLERGIC/IMMUNOLOGIC NEGATIVE: 1
CONSTIPATION: 0
RECTAL PAIN: 0
BLOOD IN STOOL: 0
WHEEZING: 0
EYE REDNESS: 0
ABDOMINAL DISTENTION: 0
VOICE CHANGE: 0
BLURRED VISION: 0
TROUBLE SWALLOWING: 0
SHORTNESS OF BREATH: 0
ANAL BLEEDING: 0
DIARRHEA: 0
FACIAL SWELLING: 0
VOMITING: 0
EYE ITCHING: 0
APNEA: 0
CHOKING: 0
COUGH: 0
SINUS PAIN: 0
EYE PAIN: 0
SORE THROAT: 0
ABDOMINAL PAIN: 0
STRIDOR: 0
COLOR CHANGE: 0
SINUS PRESSURE: 0
EYE DISCHARGE: 0
PHOTOPHOBIA: 0

## 2023-01-20 ASSESSMENT — SOCIAL DETERMINANTS OF HEALTH (SDOH): HOW HARD IS IT FOR YOU TO PAY FOR THE VERY BASICS LIKE FOOD, HOUSING, MEDICAL CARE, AND HEATING?: NOT HARD AT ALL

## 2023-01-20 NOTE — PROGRESS NOTES
Sonia Mendoza is a 28 y.o. female. HPI/Chief C/O:  Chief Complaint   Patient presents with    Diabetes     Pt here for a follow up on her diabetes. Results     Pt here to discuss lab results. Weight Gain     Pt states she is having a hard time losing weight. Allergies   Allergen Reactions    Latex Dermatitis   The patient is here for a medication list and treatment planning review  We will go over our care planning goals as well as take care of all refills  We will set up labs as well        Diabetes  She presents for her follow-up diabetic visit. She has gestational diabetes mellitus. Hypoglycemia symptoms include nervousness/anxiousness. Pertinent negatives for hypoglycemia include no confusion, dizziness, headaches, pallor, seizures, speech difficulty or tremors. Associated symptoms include fatigue and weakness. Pertinent negatives for diabetes include no blurred vision, no chest pain, no polydipsia, no polyphagia, no polyuria and no weight loss. There are no hypoglycemic complications. Pertinent negatives for diabetic complications include no CVA, PVD or retinopathy. Risk factors for coronary artery disease include obesity and diabetes mellitus. Current diabetic treatment includes diet (Ozempic). She is compliant with treatment some of the time. She is following a generally unhealthy diet. An ACE inhibitor/angiotensin II receptor blocker is contraindicated. Hypertension  This is a chronic problem. The current episode started more than 1 year ago. The problem is controlled. Associated symptoms include malaise/fatigue. Pertinent negatives include no blurred vision, chest pain, headaches, neck pain, palpitations, peripheral edema or shortness of breath. Risk factors for coronary artery disease include family history. Past treatments include lifestyle changes and beta blockers. The current treatment provides significant improvement.  Compliance problems include exercise, diet and psychosocial issues. There is no history of angina, kidney disease, CAD/MI (leiden factor V), CVA, heart failure, left ventricular hypertrophy, PVD or retinopathy. Identifiable causes of hypertension include a thyroid problem. There is no history of chronic renal disease, coarctation of the aorta, hyperaldosteronism, hypercortisolism, hyperparathyroidism, a hypertension causing med, pheochromocytoma, renovascular disease or sleep apnea. Back Pain  This is a chronic problem. The current episode started more than 1 month ago. The problem occurs constantly. The problem has been gradually worsening since onset. The pain is present in the lumbar spine. The quality of the pain is described as shooting and stabbing. The pain is at a severity of 8/10. The pain is moderate. The pain is The same all the time. Stiffness is present All day. Associated symptoms include leg pain and weakness. Pertinent negatives include no abdominal pain, chest pain, dysuria, fever, headaches, numbness, paresis, paresthesias, pelvic pain, perianal numbness or weight loss. ROS:  Review of Systems   Constitutional:  Positive for fatigue and malaise/fatigue. Negative for activity change, appetite change, chills, diaphoresis, fever, unexpected weight change and weight loss. HENT:  Negative for congestion, dental problem, drooling, ear discharge, ear pain, facial swelling, hearing loss, mouth sores, nosebleeds, postnasal drip, rhinorrhea, sinus pressure, sinus pain, sneezing, sore throat, tinnitus, trouble swallowing and voice change. Eyes:  Negative for blurred vision, photophobia, pain, discharge, redness, itching and visual disturbance. Respiratory:  Negative for apnea, cough, choking, chest tightness, shortness of breath, wheezing and stridor. Cardiovascular: Negative. Negative for chest pain, palpitations and leg swelling.    Gastrointestinal:  Negative for abdominal distention, abdominal pain, anal bleeding, blood in stool, constipation, diarrhea, nausea, rectal pain and vomiting. Endocrine: Negative. Negative for cold intolerance, heat intolerance, polydipsia, polyphagia and polyuria. Genitourinary: Negative. Negative for decreased urine volume, difficulty urinating, dyspareunia, dysuria, enuresis, flank pain, frequency, genital sores, hematuria, menstrual problem, pelvic pain, urgency, vaginal bleeding, vaginal discharge and vaginal pain. Musculoskeletal:  Positive for back pain and gait problem. Negative for arthralgias, joint swelling, myalgias, neck pain and neck stiffness. Skin: Negative. Negative for color change, pallor, rash and wound. Allergic/Immunologic: Negative. Negative for environmental allergies, food allergies and immunocompromised state. Neurological:  Positive for weakness. Negative for dizziness, tremors, seizures, syncope, facial asymmetry, speech difficulty, light-headedness, numbness, headaches and paresthesias. Hematological:  Negative for adenopathy. Bruises/bleeds easily. Psychiatric/Behavioral:  Positive for decreased concentration and dysphoric mood. Negative for agitation, behavioral problems, confusion, hallucinations, self-injury, sleep disturbance and suicidal ideas. The patient is nervous/anxious. The patient is not hyperactive.        Past Medical/Surgical Hx;  Reviewed with patient      Diagnosis Date    Anemia     Anxiety     Asthma     Blood clot embolism during pregnancy, antepartum     Cerebrovascular accident (CVA) due to vascular occlusion ([facility]      delivery delivered 2021    Chronic migraine     Coagulopathy ([facility] [facility]     Diabetes mellitus ([facility] [address]     gestational - no current issues as of 10-17-22    DVT of leg (deep venous thrombosis) ([facility])     left    DVT of upper extremity (deep vein thrombosis) ([facility])     Left X 2    Factor 5 Leiden mutation, heterozygous ([address]     Heterozygous for BERT-1 4G allele     Hyperlipidemia     Low back pain     MTHFR mutation Oligohydramnios in third trimester 2021    POTS (postural orthostatic tachycardia syndrome)     Giacomo Short , neurology    Shingles     x2 - age 16, and age 29    Thrombophilia during pregnancy in third trimester ([address] 2021    Thyroid disease      Past Surgical History:   Procedure Laterality Date     SECTION N/A 2021     SECTION performed by Nereida Siemens, MD at [facility] L&D [address] OF UTERUS      FRACTURE SURGERY      10    HERNIA REPAIR Bilateral 10/20/2022    LAPAROSCOPIC BILATERAL INGUINAL HERNIA REPAIR POSSIBLE MESH performed by Rigo Barrios MD at [address] Left 2018    transforaminal epidural #1    [place] [facility] [address] TIP ONLY Left 2018    LEFT L5-S1 TRANSFORAMINAL EPIDURAL STEROID INJECTION #1 performed by Tricia Bernal DO at [address] N/A 10/20/2022    HERNIA VENTRAL REPAIR LAPAROSCOPIC POSSIBLE MESH/LAPAROSCOPIC BILATERAL INGUINAL HERNIA REPAIR POSSIBLE MESH performed by Rigo Barrios MD at [facility] Right     may       Past Family Hx:  Reviewed with patient      Problem Relation Age of Onset    Diabetes Father     Deep Vein Thrombosis Maternal Grandmother     Substance Abuse Brother     Stroke Paternal Uncle        Social Hx:  Reviewed with patient  Social History     Tobacco Use    Smoking status: Former     Packs/day: 0.50     Types: Cigarettes     Start date:      Quit date: 2021     Years since quittin.5    Smokeless tobacco: Never   Substance Use Topics    Alcohol use: Not Currently       OBJECTIVE  /76   Pulse 88   Temp 97.3 °F (36.3 °C) (Temporal)   Resp 18   Ht 5' 6\" (1.676 m)   Wt 288 lb (130.6 kg)   SpO2 96%   Breastfeeding No   BMI 46.48 kg/m²     Problem List:  Prasad Gerardo does not have any pertinent problems on file. PHYS EX:  Physical Exam  Vitals and nursing note reviewed.    Constitutional: General: She is not in acute distress. Appearance: Normal appearance. She is well-developed. She is obese. She is not ill-appearing, toxic-appearing or diaphoretic. HENT:      Head: Normocephalic and atraumatic. Right Ear: External ear normal.      Left Ear: External ear normal.      Nose: Nose normal. No congestion or rhinorrhea. Mouth/Throat:      Mouth: Mucous membranes are moist.      Pharynx: No oropharyngeal exudate or posterior oropharyngeal erythema. Eyes:      General: No scleral icterus. Right eye: No discharge. Left eye: No discharge. Extraocular Movements: Extraocular movements intact. Conjunctiva/sclera: Conjunctivae normal.      Pupils: Pupils are equal, round, and reactive to light. Neck:      Thyroid: No thyromegaly. Vascular: No carotid bruit or JVD. Trachea: No tracheal deviation. Cardiovascular:      Rate and Rhythm: Regular rhythm. Pulses: Normal pulses. Heart sounds: Normal heart sounds. No murmur heard. No friction rub. No gallop. Pulmonary:      Effort: Pulmonary effort is normal. No respiratory distress. Breath sounds: Normal breath sounds. No stridor. No wheezing, rhonchi or rales. Chest:      Chest wall: No tenderness. Abdominal:      General: Bowel sounds are normal. There is no distension. Palpations: Abdomen is soft. There is no mass. Tenderness: There is no abdominal tenderness. There is no right CVA tenderness, left CVA tenderness, guarding or rebound. Hernia: No hernia is present. Musculoskeletal:         General: Tenderness (lumbar pain) present. No swelling, deformity or signs of injury. Normal range of motion. Cervical back: Normal range of motion and neck supple. No rigidity. No muscular tenderness. Right lower leg: No edema. Left lower leg: No edema. Lymphadenopathy:      Cervical: No cervical adenopathy. Skin:     General: Skin is warm.       Coloration: Skin is not jaundiced or pale. Findings: No bruising, erythema, lesion or rash. Neurological:      General: No focal deficit present. Mental Status: She is alert and oriented to person, place, and time. Cranial Nerves: No cranial nerve deficit. Sensory: Sensory deficit present. Motor: Weakness present. No abnormal muscle tone. Coordination: Coordination abnormal.      Gait: Gait abnormal.      Deep Tendon Reflexes: Reflexes abnormal.      Comments: Left foot drop        ASSESSMENT/PLAN  Tang Ramos was seen today for diabetes, results and weight gain. Diagnoses and all orders for this visit:    Type 2 diabetes mellitus without complication, without long-term current use of insulin (HCC)    ---VASCULAR PANEL  A) asa, plavix, aggrenox  B) ELIQUIS, pletal, tzd, STATIN  C) ace, hctz, FOLIC, ccb  D) cannikinumab, fish oils     ---CARDIAC---ELIQUIS, ace, BETA, STATIN, hctz, ( ccb )     A) ace or arb  B) LIPITOR 40  or crestor ( 20 to 40 )  C) GLP- 1  or sglt 2       Cerebral artery occlusion with cerebral infarction (HCC)  -     folic acid (FOLVITE) 1 MG tablet; One a day  --stable on current care planning  -- continue treatment as we are meeting goals       Low vitamin D level  -     Vitamin D, Cholecalciferol, 50 MCG (2000 UT) CAPS; Take 1 capsule by mouth once daily    POTS (postural orthostatic tachycardia syndrome)  -     metoprolol succinate (TOPROL XL) 25 MG extended release tablet; Take 0.5 tablets by mouth nightly  --stable on current care planning  -- continue treatment as we are meeting goals       Cough  -     albuterol sulfate HFA (VENTOLIN HFA) 108 (90 Base) MCG/ACT inhaler;  Inhale 2 puffs into the lungs every 6 hours as needed for Wheezing    Morbid obesity (HCC)  -     Semaglutide,0.25 or 0.5MG/DOS, (OZEMPIC, 0.25 OR 0.5 MG/DOSE,) 2 MG/1.5ML SOPN; Inject 0.25 mg into the skin once a week  --talk diet and weight loss      Prediabetes  -     Semaglutide,0.25 or 0.5MG/DOS, (OZEMPIC, 0. 25 OR 0.5 MG/DOSE,) 2 MG/1.5ML SOPN; Inject 0.25 mg into the skin once a week  -     Insulin Pen Needle 31G X 5 MM MISC; 1 each by Does not apply route daily    Factor V deficiency (HCC)  --stable on current care planning  -- continue treatment as we are meeting goals       Mild intermittent asthma without complication  --stable on current care planning  -- continue treatment as we are meeting goals   --PLAN--aerosol accuneb 1.25 plus chest percussion--Rx      Other orders  -     Rimegepant Sulfate 75 MG TBDP; Take 75 mg by mouth every other day No more than 1 tab in 24 hours; ok for additional dose on off days PRN breakthrough headache  -     atorvastatin (LIPITOR) 40 MG tablet; Take 1 tablet by mouth nightly  -     pantoprazole (PROTONIX) 40 MG tablet; Take 1 tablet by mouth daily  -     apixaban (ELIQUIS) 5 MG TABS tablet; Take 1 tablet by mouth 2 times daily  -     diclofenac sodium (VOLTAREN) 1 % GEL; Apply 2 g topically 4 times daily  -     pyridostigmine (MESTINON) 60 MG tablet; Take 1 tablet by mouth 3 times daily  -     citalopram (CELEXA) 20 MG tablet;  Take 1.5 tablets by mouth daily      Outpatient Encounter Medications as of 1/20/2023   Medication Sig Dispense Refill    QUEtiapine (SEROQUEL) 25 MG tablet TAKE 1/2 (ONE-HALF) TABLET BY MOUTH AT BEDTIME FOR 4 DAYS,THEN 1 AT BEDTIME DAILY      tiZANidine (ZANAFLEX) 2 MG tablet TAKE 1 TABLET BY MOUTH ONCE DAILY AS NEEDED FOR SPASMS      NALTREXONE HCL PO Take by mouth      Rimegepant Sulfate 75 MG TBDP Take 75 mg by mouth every other day No more than 1 tab in 24 hours; ok for additional dose on off days PRN breakthrough headache 16 tablet 11    folic acid (FOLVITE) 1 MG tablet One a day 90 tablet 1    Vitamin D, Cholecalciferol, 50 MCG (2000 UT) CAPS Take 1 capsule by mouth once daily 90 capsule 0    metoprolol succinate (TOPROL XL) 25 MG extended release tablet Take 0.5 tablets by mouth nightly 90 tablet 1    atorvastatin (LIPITOR) 40 MG tablet Take 1 tablet by mouth nightly 90 tablet 1    pantoprazole (PROTONIX) 40 MG tablet Take 1 tablet by mouth daily 90 tablet 1    albuterol sulfate HFA (VENTOLIN HFA) 108 (90 Base) MCG/ACT inhaler Inhale 2 puffs into the lungs every 6 hours as needed for Wheezing 1 each 5    apixaban (ELIQUIS) 5 MG TABS tablet Take 1 tablet by mouth 2 times daily 180 tablet 1    diclofenac sodium (VOLTAREN) 1 % GEL Apply 2 g topically 4 times daily 100 g 3    pyridostigmine (MESTINON) 60 MG tablet Take 1 tablet by mouth 3 times daily 270 tablet 1    citalopram (CELEXA) 20 MG tablet Take 1.5 tablets by mouth daily 90 tablet 1    Semaglutide,0.25 or 0.5MG/DOS, (OZEMPIC, 0.25 OR 0.5 MG/DOSE,) 2 MG/1.5ML SOPN Inject 0.25 mg into the skin once a week 3 Adjustable Dose Pre-filled Pen Syringe 3    Insulin Pen Needle 31G X 5 MM MISC 1 each by Does not apply route daily 100 each 3    medroxyPROGESTERone (DEPO-PROVERA) 150 MG/ML injection       gabapentin (NEURONTIN) 100 MG capsule Take 2 capsules by mouth nightly as needed (restless legs/leg pain) for up to 360 days. 180 capsule 3    clonazePAM (KLONOPIN) 1 MG tablet Take 1 mg by mouth 2 times daily as needed. Handicap Placard MISC by Does not apply route Expires in 1 year 1 each 0    [DISCONTINUED] Rimegepant Sulfate 75 MG TBDP Take 75 mg by mouth every other day No more than 1 tab in 24 hours; ok for additional dose on off days PRN breakthrough headache 16 tablet 11    [DISCONTINUED] folic acid (FOLVITE) 1 MG tablet Take 1 tablet by mouth once daily 90 tablet 0    [DISCONTINUED] Vitamin D, Cholecalciferol, 50 MCG (2000 UT) CAPS Take 1 capsule by mouth once daily 90 capsule 0    [DISCONTINUED] metoprolol succinate (TOPROL XL) 25 MG extended release tablet Take 0.5 tablets by mouth nightly 90 tablet 1    [DISCONTINUED] atorvastatin (LIPITOR) 40 MG tablet Take 1 tablet by mouth in the morning.  (Patient taking differently: Take 40 mg by mouth nightly) 90 tablet 3    [DISCONTINUED] pantoprazole (PROTONIX) 40 MG tablet Take 40 mg by mouth daily      [DISCONTINUED] albuterol sulfate HFA (VENTOLIN HFA) 108 (90 Base) MCG/ACT inhaler Inhale 2 puffs into the lungs every 6 hours as needed for Wheezing 1 each 5    [DISCONTINUED] apixaban (ELIQUIS) 5 MG TABS tablet Take 5 mg by mouth 2 times daily      [DISCONTINUED] diclofenac sodium (VOLTAREN) 1 % GEL Apply 2 g topically 4 times daily 100 g 3    [DISCONTINUED] pyridostigmine (MESTINON) 60 MG tablet Take 1 tablet by mouth 3 times daily 270 tablet 1    [DISCONTINUED] citalopram (CELEXA) 20 MG tablet Take 30 mg by mouth daily        No facility-administered encounter medications on file as of 1/20/2023. No follow-ups on file.         Reviewed recent labs related to Gertrude's current problems      Discussed importance of regular Health Maintenance follow up  Health Maintenance   Topic    COVID-19 Vaccine (1)    Varicella vaccine (1 of 2 - 2-dose childhood series)    Pneumococcal 0-64 years Vaccine (1 - PCV)    Diabetic foot exam     HIV screen     Diabetic retinal exam     Hepatitis B vaccine (1 of 3 - Risk 3-dose series)    Cervical cancer screen     Flu vaccine (1)    Diabetic Alb to Cr ratio (uACR) test     A1C test (Diabetic or Prediabetic)     Lipids     GFR test (Diabetes, CKD 3-4, OR last GFR 15-59)     Depression Screen     DTaP/Tdap/Td vaccine (2 - Td or Tdap)    Hepatitis C screen     Hepatitis A vaccine     Hib vaccine     Meningococcal (ACWY) vaccine

## 2023-02-02 ENCOUNTER — TELEPHONE (OUTPATIENT)
Dept: FAMILY MEDICINE CLINIC | Age: 33
End: 2023-02-02

## 2023-02-02 NOTE — TELEPHONE ENCOUNTER
Please advise.        Angie Mode     Brennan: [id number]    Ozempic (0.25 or 0.5 MG/DOSE) 2MG/1.5ML pen-injectors     Form  [employer] Medicare Part D Electronic Prior Authorization Form (2017 NCPDP)    Determination  Unfavorable    Message from [address] 2/1.5ML is denied due to Plan Exclusion    Electronically signed by Shay Manzano MA on 2/2/23 at 11:35 AM EST

## 2023-02-02 NOTE — TELEPHONE ENCOUNTER
Olena Duarte     Brennan: [id number]    Ozempic (0.25 or 0.5 MG/DOSE) 2MG/1.5ML pen-injectors     Form  [employer] Medicare Part D Electronic Prior Authorization Form (2017 NCPDP)    Wait for Determination  Please wait for [employer] 2017 NCPDP to return a determination.

## 2023-02-08 NOTE — TELEPHONE ENCOUNTER
Pt called again regarding this issue, please advise what other medication she can take.      Electronically signed by Becky Higgins on 2/8/23 at 3:22 PM EST

## 2023-02-20 ENCOUNTER — TELEPHONE (OUTPATIENT)
Dept: FAMILY MEDICINE CLINIC | Age: 33
End: 2023-02-20

## 2023-02-20 DIAGNOSIS — E27.9 ADRENAL DISORDER (HCC): Primary | ICD-10-CM

## 2023-02-20 DIAGNOSIS — R79.89 LOW VITAMIN D LEVEL: ICD-10-CM

## 2023-02-20 NOTE — TELEPHONE ENCOUNTER
Order placed. This MA attempted tor each pt. No answer. Left message for pt advising order is placed for labs. Pt advised on voicemail to utilize any [facility] lab to have this drawn. Pt advised to return call to office with any questions or concerns, office contact information provided.    Electronically signed by Mana Quiroz MA on 2/20/23 at 3:52 PM EST

## 2023-02-20 NOTE — TELEPHONE ENCOUNTER
Pt called requesting order for cortisol levels to be checked. Pt reports one of her other providers suggested the possibility of her having Cushing's Disease.    Please advise.    Electronically signed by Mana Quiroz MA on 2/20/23 at 11:49 AM EST

## 2023-02-21 RX ORDER — MULTIVIT-MIN/IRON/FOLIC ACID/K 18-600-40
1 CAPSULE ORAL DAILY
Qty: 90 CAPSULE | Refills: 1 | Status: SHIPPED | OUTPATIENT
Start: 2023-02-21

## 2023-02-27 ENCOUNTER — HOSPITAL ENCOUNTER (OUTPATIENT)
Age: 33
Discharge: HOME | End: 2023-02-27
Payer: COMMERCIAL

## 2023-02-27 DIAGNOSIS — E27.9 ADRENAL DISORDER (HCC): ICD-10-CM

## 2023-02-27 LAB — CORTISOL TOTAL: 9.04 MCG/DL (ref 2.68–18.4)

## 2023-02-27 PROCEDURE — 36415 COLL VENOUS BLD VENIPUNCTURE: CPT

## 2023-02-27 PROCEDURE — 82533 TOTAL CORTISOL: CPT

## 2023-03-01 ENCOUNTER — SCHEDULED TELEPHONE ENCOUNTER (OUTPATIENT)
Dept: NEUROLOGY | Age: 33
End: 2023-03-01
Payer: COMMERCIAL

## 2023-03-01 DIAGNOSIS — G43.109 CHRONIC MIGRAINE WITH AURA: ICD-10-CM

## 2023-03-01 DIAGNOSIS — I69.30 PERSONAL HISTORY OF STROKE WITH RESIDUAL EFFECTS: Primary | ICD-10-CM

## 2023-03-01 PROBLEM — O24.414 INSULIN CONTROLLED GESTATIONAL DIABETES MELLITUS (GDM) IN SECOND TRIMESTER: Status: RESOLVED | Noted: 2021-10-20 | Resolved: 2023-03-01

## 2023-03-01 PROCEDURE — 99441 PR PHYS/QHP TELEPHONE EVALUATION 5-10 MIN: CPT | Performed by: NURSE PRACTITIONER

## 2023-03-01 RX ORDER — DEXAMETHASONE 4 MG/1
TABLET ORAL
Qty: 6 TABLET | Refills: 0 | Status: SHIPPED | OUTPATIENT
Start: 2023-03-01 | End: 2023-03-04

## 2023-03-01 RX ORDER — TRAZODONE HYDROCHLORIDE 100 MG/1
1 TABLET ORAL NIGHTLY
COMMUNITY
Start: 2023-02-28

## 2023-03-01 RX ORDER — ATOGEPANT 60 MG/1
60 TABLET ORAL DAILY
Qty: 30 TABLET | Refills: 11 | Status: SHIPPED | OUTPATIENT
Start: 2023-03-01

## 2023-03-01 NOTE — PROGRESS NOTES
Mikki Jiménez was read the following message We want to confirm that, for purposes of billing, this is a virtual visit with your provider for which we will submit a claim for reimbursement with your insurance company. You will be responsible for any copays, coinsurance amounts or other amounts not covered by your insurance company. If you do not accept this, unfortunately we will not be able to schedule or proceed with a virtual visit with the provider. Do you accept? Socrates Akbar responded Yes .

## 2023-03-01 NOTE — PROGRESS NOTES
239 [facility] MSN, APRN-CNP, [address], [address] [place] [address]      [phone]                                  Office Follow Up--Phone Visit    Tiara Ayoub is a 28 y.o. woman evaluated by phone 3/1/2023    The patient and/or health care decision maker is aware that that he/she may receive a bill for this telephone service, depending on his/her insurance coverage, and has provided verbal consent to proceed: Yes     I affirm this is a Patient Initiated Episode with an Established Patient who has not had a related appointment within my department in the past 7 days or scheduled within the next 24 hours. The patient's identity was verified at the start of the call. Others involved in call: no one     Total Time: minutes: 5-10 minutes    Note: not billable if this call serves to triage the patient into an appointment for the relevant concern    HPI:     We are following her for stroke and migraines    She remains a good historian. She changed to a phone visit today because she has been suffering with POTS symptoms and is feeling lightheaded. She follows with Dr. Jevon Bear who has been treating her POTS only, and put her on a new medication, naltrexone, atop her Mestinon and metoprolol. Unfortunately, she does not feel any benefit to preventive use of Nurtec every other day with regards to overall headache reduction, and is still having at least 3 headaches per week. She uses the Nurtec on the off days for acute therapy, and it does reduce the severity of the headaches but does not completely abort them. She felt that the Nurtec worked better when she was taking it for rescue only. She admits to using Tylenol and ibuprofen at least 3 times a week. .    Migraine medications failed: Topiramate; Emgality, triptans are contraindicated with her history of stroke and her use of SSRIs.   She is already on trazodone, metoprolol, gabapentin, Celexa. She remains on anticoagulation and statin for stroke prevention and LDL is at goal.  No falls or new weakness since her last visit and gabapentin is helpful with her left leg numbness. She wears her left AFO faithfully. No chest pain or palpitations  No SOB  No vertigo or loss of consciousness  No falls, tripping or stumbling  No incontinence of bowels or bladder  No itching or bruising   No speech or swallowing problems    ROS otherwise negative      Medications:     Prior to Admission medications    Medication Sig Start Date End Date Taking?  Authorizing Provider   traZODone (DESYREL) 100 MG tablet Take 1 tablet by mouth nightly 2/28/23  Yes Historical Provider, MD   Vitamin D, Cholecalciferol, 50 MCG (2000 UT) CAPS Take 1 capsule by mouth daily 2/21/23  Yes Ligia Mccurdy Catterlin, DO   tiZANidine (ZANAFLEX) 2 MG tablet TAKE 1 TABLET BY MOUTH ONCE DAILY AS NEEDED FOR SPASMS 12/21/22  Yes Historical Provider, MD   NALTREXONE HCL PO Take by mouth   Yes Historical Provider, MD   Rimegepant Sulfate 75 MG TBDP Take 75 mg by mouth every other day No more than 1 tab in 24 hours; ok for additional dose on off days PRN breakthrough headache 1/20/23  Yes Ligia Mccurdy Catterlin, DO   folic acid (FOLVITE) 1 MG tablet One a day 1/20/23  Yes Ligia Mccurdy Catterlin, DO   metoprolol succinate (TOPROL XL) 25 MG extended release tablet Take 0.5 tablets by mouth nightly 1/20/23  Yes Ligia Mccurdy Catterlin, DO   atorvastatin (LIPITOR) 40 MG tablet Take 1 tablet by mouth nightly 1/20/23  Yes Ligia Sandersots Catterlin, DO   pantoprazole (PROTONIX) 40 MG tablet Take 1 tablet by mouth daily 1/20/23  Yes Ligia Mccurdy Catterlin, DO   albuterol sulfate HFA (VENTOLIN HFA) 108 (90 Base) MCG/ACT inhaler Inhale 2 puffs into the lungs every 6 hours as needed for Wheezing 1/20/23  Yes Ligia Mccurdy Catterlin, DO   apixaban (ELIQUIS) 5 MG TABS tablet Take 1 tablet by mouth 2 times daily 1/20/23  Yes Moody Ramos, DO   diclofenac sodium (VOLTAREN) 1 % GEL Apply 2 g topically 4 times daily 1/20/23  Yes Becca Radford Catterlin, DO   pyridostigmine (MESTINON) 60 MG tablet Take 1 tablet by mouth 3 times daily 1/20/23  Yes Becca Radford Catterlin, DO   citalopram (CELEXA) 20 MG tablet Take 1.5 tablets by mouth daily 1/20/23  Yes Becca Manriqueterlin, DO   medroxyPROGESTERone (DEPO-PROVERA) 150 MG/ML injection  9/26/22  Yes Historical Provider, MD   gabapentin (NEURONTIN) 100 MG capsule Take 2 capsules by mouth nightly as needed (restless legs/leg pain) for up to 360 days. 9/23/22 9/18/23 Yes Carmel RiveraeELADIO - CNP   clonazePAM (KLONOPIN) 1 MG tablet Take 1 mg by mouth 2 times daily as needed.    Yes Historical Provider, MD   Handicap Placard MISC by Does not apply route Expires in 1 year 12/10/21  Yes Moody Carver,      Objective:     Mental Status Exam: sounds alert, oriented x4; thought processes appropriate    Speech sounds clear    Breathing Effort sounds normal    Laboratory/Radiology:     CBC with Differential:    Lab Results   Component Value Date/Time    WBC 7.2 01/18/2023 12:49 PM    RBC 4.82 01/18/2023 12:49 PM    HGB 14.5 01/18/2023 12:49 PM    HCT 44.1 01/18/2023 12:49 PM     01/18/2023 12:49 PM    MCV 91.5 01/18/2023 12:49 PM    MCH 30.1 01/18/2023 12:49 PM    MCHC 32.9 01/18/2023 12:49 PM    RDW 12.4 01/18/2023 12:49 PM    LYMPHOPCT 31.4 01/18/2023 12:49 PM    MONOPCT 8.3 01/18/2023 12:49 PM    BASOPCT 0.7 01/18/2023 12:49 PM    MONOSABS 0.60 01/18/2023 12:49 PM    LYMPHSABS 2.27 01/18/2023 12:49 PM    EOSABS 0.18 01/18/2023 12:49 PM    BASOSABS 0.05 01/18/2023 12:49 PM     CMP:    Lab Results   Component Value Date/Time     01/18/2023 12:49 PM    K 4.1 01/18/2023 12:49 PM    K 4.4 10/17/2022 10:25 AM     01/18/2023 12:49 PM    CO2 22 01/18/2023 12:49 PM    BUN 9 01/18/2023 12:49 PM    CREATININE 0.8 01/18/2023 12:49 PM    GFRAA >60 10/17/2022 10:25 AM    LABGLOM >60 01/18/2023 12:49 PM    GLUCOSE 106 01/18/2023 12:49 PM    PROT 7.7 01/18/2023 12:49 PM    LABALBU 4.2 01/18/2023 12:49 PM    CALCIUM 10.0 01/18/2023 12:49 PM    BILITOT 0.5 01/18/2023 12:49 PM    ALKPHOS 108 01/18/2023 12:49 PM    AST 22 01/18/2023 12:49 PM    ALT 35 01/18/2023 12:49 PM     HgBA1c:    Lab Results   Component Value Date/Time    LABA1C 5.8 01/18/2023 12:49 PM     FLP:    Lab Results   Component Value Date/Time    TRIG 236 01/18/2023 12:49 PM    HDL 38 01/18/2023 12:49 PM    LDLCALC 58 01/18/2023 12:49 PM    LABVLDL 47 01/18/2023 12:49 PM     All labs and images personally reviewed today    Assessment:     Right hemispheric ischemic stroke: Secondary to thrombosis from underlying hypercoagulopathy while on hormonal contraceptives and smoking. Chronic migraines with aura: Aggravated by history of stroke and uncontrolled POTS. She is still having 12 headaches per month despite preventive use of Nurtec--though it is effective for rescue. She she may have better headache prevention with Francisca Alu. Possible medication overuse headache: Using OTC analgesia over 12 times per month. POTS: Managed by Dr. 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 follows with heme/onc    Plan:     -Start Qulipta 60 mg daily  -Change Nurtec back to PRN  -14 day washout from OTC analgesia then use no more than 10x per month  -Educated on [facility]  -3 day dexamethasone burst sent to use for intractable migraine  -Continue gabapentin 200 mg nightly PRN  -Continue anticoag and high intensity statin  -Mod vasc risk factors: goal BP <140/90, LDL <70, A1C <7.0    Reviewed signs and symptoms of stroke including B.E.F.A.S. T:   Balance: Does the person have a sudden loss of balance? Eyes: Has the person lost vision in one or both eyes? Face: Does the person's face look uneven? Arm: Is one arm weaker or numb? Speech: Is the person's speech slurred? Does the person have trouble speaking or seen confused? Time: Call 9-1-1 immediately.       Return to office in 6 months or sooner ELADIO Peguero - CNP, [facility]  2:10 PM  3/1/2023

## 2023-03-15 ENCOUNTER — TELEPHONE (OUTPATIENT)
Dept: NEUROLOGY | Age: 33
End: 2023-03-15

## 2023-03-15 NOTE — TELEPHONE ENCOUNTER
Request Reference Number: [id number].  QULIPTA TAB 60MG is approved through 12/31/2023 per Cover My Meds

## 2023-05-02 ENCOUNTER — OFFICE VISIT (OUTPATIENT)
Dept: FAMILY MEDICINE CLINIC | Age: 33
End: 2023-05-02
Payer: COMMERCIAL

## 2023-05-02 VITALS
RESPIRATION RATE: 20 BRPM | TEMPERATURE: 97.7 F | OXYGEN SATURATION: 97 % | BODY MASS INDEX: 47.09 KG/M2 | HEART RATE: 88 BPM | WEIGHT: 293 LBS | DIASTOLIC BLOOD PRESSURE: 78 MMHG | SYSTOLIC BLOOD PRESSURE: 110 MMHG | HEIGHT: 66 IN

## 2023-05-02 DIAGNOSIS — E11.9 TYPE 2 DIABETES MELLITUS WITHOUT COMPLICATION, WITHOUT LONG-TERM CURRENT USE OF INSULIN (HCC): Primary | ICD-10-CM

## 2023-05-02 DIAGNOSIS — M54.16 LUMBAR RADICULOPATHY: ICD-10-CM

## 2023-05-02 DIAGNOSIS — E66.01 MORBID OBESITY (HCC): ICD-10-CM

## 2023-05-02 DIAGNOSIS — G90.A POTS (POSTURAL ORTHOSTATIC TACHYCARDIA SYNDROME): ICD-10-CM

## 2023-05-02 DIAGNOSIS — D68.2 FACTOR V DEFICIENCY (HCC): ICD-10-CM

## 2023-05-02 LAB — HBA1C MFR BLD: 5.9 %

## 2023-05-02 PROCEDURE — 3044F HG A1C LEVEL LT 7.0%: CPT | Performed by: FAMILY MEDICINE

## 2023-05-02 PROCEDURE — 99214 OFFICE O/P EST MOD 30 MIN: CPT | Performed by: FAMILY MEDICINE

## 2023-05-02 PROCEDURE — G8427 DOCREV CUR MEDS BY ELIG CLIN: HCPCS | Performed by: FAMILY MEDICINE

## 2023-05-02 PROCEDURE — 83036 HEMOGLOBIN GLYCOSYLATED A1C: CPT | Performed by: FAMILY MEDICINE

## 2023-05-02 PROCEDURE — G8417 CALC BMI ABV UP PARAM F/U: HCPCS | Performed by: FAMILY MEDICINE

## 2023-05-02 PROCEDURE — 1036F TOBACCO NON-USER: CPT | Performed by: FAMILY MEDICINE

## 2023-05-02 PROCEDURE — 2022F DILAT RTA XM EVC RTNOPTHY: CPT | Performed by: FAMILY MEDICINE

## 2023-05-02 SDOH — ECONOMIC STABILITY: FOOD INSECURITY: WITHIN THE PAST 12 MONTHS, THE FOOD YOU BOUGHT JUST DIDN'T LAST AND YOU DIDN'T HAVE MONEY TO GET MORE.: NEVER TRUE

## 2023-05-02 SDOH — ECONOMIC STABILITY: FOOD INSECURITY: WITHIN THE PAST 12 MONTHS, YOU WORRIED THAT YOUR FOOD WOULD RUN OUT BEFORE YOU GOT MONEY TO BUY MORE.: NEVER TRUE

## 2023-05-02 SDOH — ECONOMIC STABILITY: INCOME INSECURITY: HOW HARD IS IT FOR YOU TO PAY FOR THE VERY BASICS LIKE FOOD, HOUSING, MEDICAL CARE, AND HEATING?: NOT HARD AT ALL

## 2023-05-02 SDOH — ECONOMIC STABILITY: HOUSING INSECURITY
IN THE LAST 12 MONTHS, WAS THERE A TIME WHEN YOU DID NOT HAVE A STEADY PLACE TO SLEEP OR SLEPT IN A SHELTER (INCLUDING NOW)?: NO

## 2023-05-02 ASSESSMENT — ENCOUNTER SYMPTOMS
VOICE CHANGE: 0
SINUS PRESSURE: 0
NAUSEA: 0
EYE ITCHING: 0
RECTAL PAIN: 0
CHOKING: 0
VOMITING: 0
SORE THROAT: 0
ALLERGIC/IMMUNOLOGIC NEGATIVE: 1
TROUBLE SWALLOWING: 0
RHINORRHEA: 0
EYE PAIN: 0
BACK PAIN: 1
COLOR CHANGE: 0
CONSTIPATION: 0
BLOOD IN STOOL: 0
EYE DISCHARGE: 0
PHOTOPHOBIA: 0
COUGH: 0
APNEA: 0
SINUS PAIN: 0
BLURRED VISION: 0
SHORTNESS OF BREATH: 0
FACIAL SWELLING: 0
STRIDOR: 0
WHEEZING: 0
EYE REDNESS: 0
ABDOMINAL PAIN: 0
ANAL BLEEDING: 0
ABDOMINAL DISTENTION: 0
DIARRHEA: 0
CHEST TIGHTNESS: 0
ORTHOPNEA: 0

## 2023-05-02 ASSESSMENT — LIFESTYLE VARIABLES
HOW MANY STANDARD DRINKS CONTAINING ALCOHOL DO YOU HAVE ON A TYPICAL DAY: PATIENT DOES NOT DRINK
HOW OFTEN DO YOU HAVE A DRINK CONTAINING ALCOHOL: NEVER

## 2023-05-02 ASSESSMENT — PATIENT HEALTH QUESTIONNAIRE - PHQ9
SUM OF ALL RESPONSES TO PHQ QUESTIONS 1-9: 0
SUM OF ALL RESPONSES TO PHQ QUESTIONS 1-9: 0
2. FEELING DOWN, DEPRESSED OR HOPELESS: 0
SUM OF ALL RESPONSES TO PHQ QUESTIONS 1-9: 0
SUM OF ALL RESPONSES TO PHQ9 QUESTIONS 1 & 2: 0
SUM OF ALL RESPONSES TO PHQ QUESTIONS 1-9: 0
1. LITTLE INTEREST OR PLEASURE IN DOING THINGS: 0

## 2023-05-07 DIAGNOSIS — F17.210 SMOKING GREATER THAN 30 PACK YEARS: Primary | ICD-10-CM

## 2023-05-16 NOTE — CARE COORDINATION
ACM contacted Jourdan Gaffney to discuss patient status and possible referral to [facility]. Naila Rizo stated that she would accept the referral to discuss patient's dietary needs r/t POTS disease and weight management. ACM will ask PCP to place referral to Jourdan Gaffney. Session ID: [id number]  Request [id number]  Language:Hong Konger  Status:Fulfilled  Agent PI:#[id number]  Agent Tiffani Strauss

## 2023-05-26 ENCOUNTER — APPOINTMENT (OUTPATIENT)
Dept: CT IMAGING | Age: 33
End: 2023-05-26
Payer: COMMERCIAL

## 2023-05-26 ENCOUNTER — HOSPITAL ENCOUNTER (EMERGENCY)
Age: 33
Discharge: HOME | End: 2023-05-26
Attending: EMERGENCY MEDICINE
Payer: COMMERCIAL

## 2023-05-26 VITALS
BODY MASS INDEX: 45.8 KG/M2 | SYSTOLIC BLOOD PRESSURE: 142 MMHG | OXYGEN SATURATION: 98 % | RESPIRATION RATE: 16 BRPM | HEIGHT: 66 IN | HEART RATE: 74 BPM | WEIGHT: 285 LBS | DIASTOLIC BLOOD PRESSURE: 67 MMHG | TEMPERATURE: 98.5 F

## 2023-05-26 DIAGNOSIS — R10.84 GENERALIZED ABDOMINAL PAIN: Primary | ICD-10-CM

## 2023-05-26 LAB
ALBUMIN SERPL-MCNC: 4.6 G/DL (ref 3.5–5.2)
ALP SERPL-CCNC: 120 U/L (ref 35–104)
ALT SERPL-CCNC: 38 U/L (ref 0–32)
ANION GAP SERPL CALCULATED.3IONS-SCNC: 18 MMOL/L (ref 7–16)
AST SERPL-CCNC: 27 U/L (ref 0–31)
BACTERIA URNS QL MICRO: ABNORMAL /HPF
BASOPHILS # BLD: 0.06 E9/L (ref 0–0.2)
BASOPHILS NFR BLD: 0.5 % (ref 0–2)
BILIRUB SERPL-MCNC: 0.6 MG/DL (ref 0–1.2)
BILIRUB UR QL STRIP: NEGATIVE
BUN SERPL-MCNC: 10 MG/DL (ref 6–20)
CALCIUM SERPL-MCNC: 10.7 MG/DL (ref 8.6–10.2)
CHLORIDE SERPL-SCNC: 110 MMOL/L (ref 98–107)
CLARITY UR: CLEAR
CO2 SERPL-SCNC: 13 MMOL/L (ref 22–29)
COLOR UR: YELLOW
CREAT SERPL-MCNC: 0.8 MG/DL (ref 0.5–1)
EOSINOPHIL # BLD: 0.05 E9/L (ref 0.05–0.5)
EOSINOPHIL NFR BLD: 0.4 % (ref 0–6)
EPI CELLS #/AREA URNS HPF: ABNORMAL /HPF
ERYTHROCYTE [DISTWIDTH] IN BLOOD BY AUTOMATED COUNT: 12.3 FL (ref 11.5–15)
GLUCOSE SERPL-MCNC: 105 MG/DL (ref 74–99)
GLUCOSE UR STRIP-MCNC: NEGATIVE MG/DL
HCG SERPL QL: NEGATIVE
HCT VFR BLD AUTO: 44.8 % (ref 34–48)
HGB BLD-MCNC: 15.6 G/DL (ref 11.5–15.5)
HGB UR QL STRIP: ABNORMAL
IMM GRANULOCYTES # BLD: 0.05 E9/L
IMM GRANULOCYTES NFR BLD: 0.4 % (ref 0–5)
KETONES UR STRIP-MCNC: NEGATIVE MG/DL
LACTATE BLDV-SCNC: 2.2 MMOL/L (ref 0.5–1.9)
LACTATE BLDV-SCNC: 2.3 MMOL/L (ref 0.5–1.9)
LEUKOCYTE ESTERASE UR QL STRIP: NEGATIVE
LIPASE: 28 U/L (ref 13–60)
LYMPHOCYTES # BLD: 1.67 E9/L (ref 1.5–4)
LYMPHOCYTES NFR BLD: 13.8 % (ref 20–42)
MCH RBC QN AUTO: 30.9 PG (ref 26–35)
MCHC RBC AUTO-ENTMCNC: 34.8 % (ref 32–34.5)
MCV RBC AUTO: 88.7 FL (ref 80–99.9)
MONOCYTES # BLD: 0.63 E9/L (ref 0.1–0.95)
MONOCYTES NFR BLD: 5.2 % (ref 2–12)
NEUTROPHILS # BLD: 9.62 E9/L (ref 1.8–7.3)
NEUTS SEG NFR BLD: 79.7 % (ref 43–80)
NITRITE UR QL STRIP: NEGATIVE
PH UR STRIP: 6.5 [PH] (ref 5–9)
PH VENOUS: 7.26 (ref 7.35–7.45)
PLATELET # BLD AUTO: 365 E9/L (ref 130–450)
PMV BLD AUTO: 10.1 FL (ref 7–12)
POTASSIUM SERPL-SCNC: 4.1 MMOL/L (ref 3.5–5)
PROT SERPL-MCNC: 7.8 G/DL (ref 6.4–8.3)
PROT UR STRIP-MCNC: NEGATIVE MG/DL
RBC # BLD AUTO: 5.05 E12/L (ref 3.5–5.5)
RBC #/AREA URNS HPF: ABNORMAL /HPF (ref 0–2)
SODIUM SERPL-SCNC: 141 MMOL/L (ref 132–146)
SP GR UR STRIP: <=1.005 (ref 1–1.03)
UROBILINOGEN UR STRIP-ACNC: 0.2 E.U./DL
WBC # BLD: 12.1 E9/L (ref 4.5–11.5)
WBC #/AREA URNS HPF: ABNORMAL /HPF (ref 0–5)

## 2023-05-26 PROCEDURE — 96374 THER/PROPH/DIAG INJ IV PUSH: CPT

## 2023-05-26 PROCEDURE — 2580000003 HC RX 258: Performed by: EMERGENCY MEDICINE

## 2023-05-26 PROCEDURE — 6360000004 HC RX CONTRAST MEDICATION: Performed by: RADIOLOGY

## 2023-05-26 PROCEDURE — 99285 EMERGENCY DEPT VISIT HI MDM: CPT

## 2023-05-26 PROCEDURE — 87088 URINE BACTERIA CULTURE: CPT

## 2023-05-26 PROCEDURE — 81001 URINALYSIS AUTO W/SCOPE: CPT

## 2023-05-26 PROCEDURE — 85025 COMPLETE CBC W/AUTO DIFF WBC: CPT

## 2023-05-26 PROCEDURE — 74174 CTA ABD&PLVS W/CONTRAST: CPT

## 2023-05-26 PROCEDURE — 83690 ASSAY OF LIPASE: CPT

## 2023-05-26 PROCEDURE — 74176 CT ABD & PELVIS W/O CONTRAST: CPT

## 2023-05-26 PROCEDURE — 83605 ASSAY OF LACTIC ACID: CPT

## 2023-05-26 PROCEDURE — 6360000002 HC RX W HCPCS: Performed by: EMERGENCY MEDICINE

## 2023-05-26 PROCEDURE — 82800 BLOOD PH: CPT

## 2023-05-26 PROCEDURE — 80053 COMPREHEN METABOLIC PANEL: CPT

## 2023-05-26 PROCEDURE — 96375 TX/PRO/DX INJ NEW DRUG ADDON: CPT

## 2023-05-26 PROCEDURE — 96376 TX/PRO/DX INJ SAME DRUG ADON: CPT

## 2023-05-26 PROCEDURE — 96361 HYDRATE IV INFUSION ADD-ON: CPT

## 2023-05-26 PROCEDURE — 84703 CHORIONIC GONADOTROPIN ASSAY: CPT

## 2023-05-26 RX ORDER — SODIUM CHLORIDE 9 MG/ML
INJECTION, SOLUTION INTRAVENOUS CONTINUOUS
Status: DISCONTINUED | OUTPATIENT
Start: 2023-05-26 | End: 2023-05-27 | Stop reason: HOSPADM

## 2023-05-26 RX ORDER — 0.9 % SODIUM CHLORIDE 0.9 %
1000 INTRAVENOUS SOLUTION INTRAVENOUS ONCE
Status: COMPLETED | OUTPATIENT
Start: 2023-05-26 | End: 2023-05-26

## 2023-05-26 RX ORDER — ONDANSETRON 4 MG/1
4 TABLET, ORALLY DISINTEGRATING ORAL 3 TIMES DAILY PRN
Qty: 21 TABLET | Refills: 0 | Status: SHIPPED | OUTPATIENT
Start: 2023-05-26

## 2023-05-26 RX ORDER — FENTANYL CITRATE 0.05 MG/ML
50 INJECTION, SOLUTION INTRAMUSCULAR; INTRAVENOUS
Status: DISCONTINUED | OUTPATIENT
Start: 2023-05-26 | End: 2023-05-27 | Stop reason: HOSPADM

## 2023-05-26 RX ORDER — TRAMADOL HYDROCHLORIDE 50 MG/1
50 TABLET ORAL EVERY 6 HOURS PRN
Qty: 12 TABLET | Refills: 0 | Status: SHIPPED | OUTPATIENT
Start: 2023-05-26 | End: 2023-05-29

## 2023-05-26 RX ORDER — ONDANSETRON 2 MG/ML
4 INJECTION INTRAMUSCULAR; INTRAVENOUS EVERY 30 MIN PRN
Status: COMPLETED | OUTPATIENT
Start: 2023-05-26 | End: 2023-05-26

## 2023-05-26 RX ORDER — KETOROLAC TROMETHAMINE 15 MG/ML
15 INJECTION, SOLUTION INTRAMUSCULAR; INTRAVENOUS ONCE
Status: COMPLETED | OUTPATIENT
Start: 2023-05-26 | End: 2023-05-26

## 2023-05-26 RX ADMIN — IOPAMIDOL 100 ML: 755 INJECTION, SOLUTION INTRAVENOUS at 20:23

## 2023-05-26 RX ADMIN — SODIUM CHLORIDE 1000 ML: 9 INJECTION, SOLUTION INTRAVENOUS at 19:58

## 2023-05-26 RX ADMIN — SODIUM CHLORIDE: 9 INJECTION, SOLUTION INTRAVENOUS at 18:16

## 2023-05-26 RX ADMIN — ONDANSETRON 4 MG: 2 INJECTION INTRAMUSCULAR; INTRAVENOUS at 14:40

## 2023-05-26 RX ADMIN — FENTANYL CITRATE 50 MCG: 0.05 INJECTION, SOLUTION INTRAMUSCULAR; INTRAVENOUS at 19:37

## 2023-05-26 RX ADMIN — SODIUM CHLORIDE 1000 ML: 9 INJECTION, SOLUTION INTRAVENOUS at 14:39

## 2023-05-26 RX ADMIN — ONDANSETRON 4 MG: 2 INJECTION INTRAMUSCULAR; INTRAVENOUS at 19:35

## 2023-05-26 RX ADMIN — KETOROLAC TROMETHAMINE 15 MG: 15 INJECTION, SOLUTION INTRAMUSCULAR; INTRAVENOUS at 14:41

## 2023-05-26 ASSESSMENT — ENCOUNTER SYMPTOMS
BLOOD IN STOOL: 0
EYES NEGATIVE: 1
NAUSEA: 1
RESPIRATORY NEGATIVE: 1
CONSTIPATION: 0
ANAL BLEEDING: 0
VOMITING: 1
ABDOMINAL PAIN: 1
ALLERGIC/IMMUNOLOGIC NEGATIVE: 1
DIARRHEA: 0

## 2023-05-26 ASSESSMENT — PAIN SCALES - GENERAL
PAINLEVEL_OUTOF10: 2
PAINLEVEL_OUTOF10: 4

## 2023-05-26 NOTE — ED PROVIDER NOTES
diffuse abdominal pain. Her lactate levels were elevated at 2.2. She is acidotic with a anion gap of 18 and a CO2 of 13. Serum glucose is mild elevated at 105. CT scan shows no acute pathology. Social Determinants affecting Dx or Tx: none    Chronic Conditions: obesity    Records Reviewed: yes    Patient is feeling better post IV fluids. She continues to have some mild diffuse abdominal pain without guarding or rebound. She prefers outpatient management at this time. She is stable for outpatient management. EKG is ordered to evaluate patient's current cardiac rhythm, and to interpret QT interval prior to administering medications. CBC is ordered to evaluate for any signs of infection or inflammation by obtaining a WBC count, or any signs of acute anemia by interpreting hemoglobin. CMP was ordered to evaluate for any electrolyte imbalances, kidney function, hepatic injury or any elevations in anion gap. A CT abdomen with IV contrast was ordered to evaluate for, but without limitation to, ureterolithiasis, nephrolithiasis, constipation, hollow organ perforation, small bowel obstruction, bowel ischemia, pneumoperitoneum, diverticulitis, cholecystitis, appendicitis, perforation. Diagnosis:  1. Generalized abdominal pain          Disposition  Patient's disposition: Discharge to home  Patient's condition is fair.            Cornelia Smith MD  05/26/23 [id number]

## 2023-05-27 NOTE — DISCHARGE INSTRUCTIONS
Zofran for nausea, tramadol for pain as prescribed. Call your primary care doctor in the morning for outpatient follow-up next week. Return for worsening symptoms or concerns.

## 2023-05-28 LAB — BACTERIA UR CULT: NORMAL

## 2023-06-06 ENCOUNTER — TELEMEDICINE (OUTPATIENT)
Dept: FAMILY MEDICINE CLINIC | Age: 33
End: 2023-06-06
Payer: COMMERCIAL

## 2023-06-06 DIAGNOSIS — R79.89 LOW VITAMIN D LEVEL: ICD-10-CM

## 2023-06-06 DIAGNOSIS — I63.50 CEREBRAL ARTERY OCCLUSION WITH CEREBRAL INFARCTION (HCC): Chronic | ICD-10-CM

## 2023-06-06 DIAGNOSIS — R53.83 OTHER FATIGUE: ICD-10-CM

## 2023-06-06 DIAGNOSIS — E11.9 TYPE 2 DIABETES MELLITUS WITHOUT COMPLICATION, WITHOUT LONG-TERM CURRENT USE OF INSULIN (HCC): Primary | ICD-10-CM

## 2023-06-06 DIAGNOSIS — E66.01 MORBID OBESITY (HCC): ICD-10-CM

## 2023-06-06 DIAGNOSIS — G90.A POTS (POSTURAL ORTHOSTATIC TACHYCARDIA SYNDROME): ICD-10-CM

## 2023-06-06 DIAGNOSIS — D68.2 FACTOR V DEFICIENCY (HCC): ICD-10-CM

## 2023-06-06 PROCEDURE — G8427 DOCREV CUR MEDS BY ELIG CLIN: HCPCS | Performed by: FAMILY MEDICINE

## 2023-06-06 PROCEDURE — 2022F DILAT RTA XM EVC RTNOPTHY: CPT | Performed by: FAMILY MEDICINE

## 2023-06-06 PROCEDURE — G8417 CALC BMI ABV UP PARAM F/U: HCPCS | Performed by: FAMILY MEDICINE

## 2023-06-06 PROCEDURE — 99214 OFFICE O/P EST MOD 30 MIN: CPT | Performed by: FAMILY MEDICINE

## 2023-06-06 PROCEDURE — 1036F TOBACCO NON-USER: CPT | Performed by: FAMILY MEDICINE

## 2023-06-06 PROCEDURE — 3044F HG A1C LEVEL LT 7.0%: CPT | Performed by: FAMILY MEDICINE

## 2023-06-06 RX ORDER — FOLIC ACID 1 MG/1
TABLET ORAL
Qty: 90 TABLET | Refills: 1 | Status: SHIPPED | OUTPATIENT
Start: 2023-06-06

## 2023-06-06 RX ORDER — METOPROLOL SUCCINATE 25 MG/1
12.5 TABLET, EXTENDED RELEASE ORAL NIGHTLY
Qty: 90 TABLET | Refills: 1 | Status: SHIPPED | OUTPATIENT
Start: 2023-06-06

## 2023-06-06 RX ORDER — ALBUTEROL SULFATE 90 UG/1
2 AEROSOL, METERED RESPIRATORY (INHALATION) EVERY 6 HOURS PRN
Qty: 1 EACH | Refills: 5 | Status: SHIPPED | OUTPATIENT
Start: 2023-06-06

## 2023-06-06 RX ORDER — MULTIVIT-MIN/IRON/FOLIC ACID/K 18-600-40
1 CAPSULE ORAL DAILY
Qty: 90 CAPSULE | Refills: 1 | Status: SHIPPED | OUTPATIENT
Start: 2023-06-06

## 2023-06-06 NOTE — PROGRESS NOTES
follow-up and other. Diagnoses and all orders for this visit:    Type 2 diabetes mellitus without complication, without long-term current use of insulin (HCC)  -     Dulaglutide 0.75 MG/0.5ML SOPN; Inject 0.75 mg into the skin once a week  -     Comprehensive Metabolic Panel; Future  -     CBC with Auto Differential; Future  -     Hemoglobin A1C; Future    ---VASCULAR PANEL  A) asa, plavix, aggrenox  B) ELIQUIS, pletal, tzd, STATIN  C) ace, hctz, FOLIC, ccb  D) cannikinumab, fish oils     ---CARDIAC---ELIQUIS, ace, BETA, STATIN, hctz, ( ccb )     A) ace or arb  B) LIPITOR 40  or crestor ( 20 to 40 )  C) GLP- 1  or sglt 2       Cerebral artery occlusion with cerebral infarction (HCC)  -     folic acid (FOLVITE) 1 MG tablet; One a day  -     Comprehensive Metabolic Panel; Future  -     CBC with Auto Differential; Future  --stable on current care planning  -- continue treatment as we are meeting goals       Low vitamin D level  -     Vitamin D, Cholecalciferol, 50 MCG (2000 UT) CAPS; Take 1 capsule by mouth daily  -     Comprehensive Metabolic Panel; Future  -     CBC with Auto Differential; Future    POTS (postural orthostatic tachycardia syndrome)  -     metoprolol succinate (TOPROL XL) 25 MG extended release tablet; Take 0.5 tablets by mouth nightly  -     Comprehensive Metabolic Panel; Future  -     CBC with Auto Differential; Future    Morbid obesity (HCC)  -     Dulaglutide 0.75 MG/0.5ML SOPN; Inject 0.75 mg into the skin once a week  -     Comprehensive Metabolic Panel; Future  -     CBC with Auto Differential; Future  --talk diet and weight loss    Factor V deficiency ([facility] [address]  -     Comprehensive Metabolic Panel; Future  -     CBC with Auto Differential; Future  --stable on current care planning  -- continue treatment as we are meeting goals       Other fatigue  -     Lactic Acid; Future  -     TSH; Future  -     Comprehensive Metabolic Panel;  Future  -     CBC with Auto Differential; Future    Other orders  -

## 2023-06-07 ASSESSMENT — ENCOUNTER SYMPTOMS
CONSTIPATION: 0
EYE REDNESS: 0
SINUS PAIN: 0
CHOKING: 0
ORTHOPNEA: 0
EYE PAIN: 0
DIARRHEA: 0
STRIDOR: 0
SORE THROAT: 0
BLURRED VISION: 0
TROUBLE SWALLOWING: 0
COLOR CHANGE: 0
EYE ITCHING: 0
VISUAL CHANGE: 0
NAUSEA: 0
WHEEZING: 0
FACIAL SWELLING: 0
ABDOMINAL PAIN: 0
APNEA: 0
SHORTNESS OF BREATH: 0
CHEST TIGHTNESS: 0
ANAL BLEEDING: 0
COUGH: 0
ABDOMINAL DISTENTION: 0
RECTAL PAIN: 0
RHINORRHEA: 0
EYE DISCHARGE: 0
SINUS PRESSURE: 0
BACK PAIN: 1
VOMITING: 0
PHOTOPHOBIA: 0
BLOOD IN STOOL: 0
VOICE CHANGE: 0
ALLERGIC/IMMUNOLOGIC NEGATIVE: 1

## 2023-08-21 ENCOUNTER — HOSPITAL ENCOUNTER (OUTPATIENT)
Age: 33
Discharge: HOME | End: 2023-08-21
Payer: COMMERCIAL

## 2023-08-21 DIAGNOSIS — G90.A POTS (POSTURAL ORTHOSTATIC TACHYCARDIA SYNDROME): ICD-10-CM

## 2023-08-21 DIAGNOSIS — D68.2 FACTOR V DEFICIENCY (HCC): ICD-10-CM

## 2023-08-21 DIAGNOSIS — E11.9 TYPE 2 DIABETES MELLITUS WITHOUT COMPLICATION, WITHOUT LONG-TERM CURRENT USE OF INSULIN (HCC): ICD-10-CM

## 2023-08-21 DIAGNOSIS — E66.01 MORBID OBESITY (HCC): ICD-10-CM

## 2023-08-21 DIAGNOSIS — I63.50 CEREBRAL ARTERY OCCLUSION WITH CEREBRAL INFARCTION (HCC): Chronic | ICD-10-CM

## 2023-08-21 DIAGNOSIS — R53.83 OTHER FATIGUE: ICD-10-CM

## 2023-08-21 DIAGNOSIS — R79.89 LOW VITAMIN D LEVEL: ICD-10-CM

## 2023-08-21 LAB
ALBUMIN SERPL-MCNC: 4.1 G/DL (ref 3.5–5.2)
ALP SERPL-CCNC: 103 U/L (ref 35–104)
ALT SERPL-CCNC: 52 U/L (ref 0–32)
ANION GAP SERPL CALCULATED.3IONS-SCNC: 9 MMOL/L (ref 7–16)
AST SERPL-CCNC: 37 U/L (ref 0–31)
BASOPHILS # BLD: 0.05 K/UL (ref 0–0.2)
BASOPHILS NFR BLD: 1 % (ref 0–2)
BILIRUB SERPL-MCNC: 0.4 MG/DL (ref 0–1.2)
BUN SERPL-MCNC: 7 MG/DL (ref 6–20)
CALCIUM SERPL-MCNC: 10 MG/DL (ref 8.6–10.2)
CHLORIDE SERPL-SCNC: 103 MMOL/L (ref 98–107)
CO2 SERPL-SCNC: 24 MMOL/L (ref 22–29)
CREAT SERPL-MCNC: 0.9 MG/DL (ref 0.5–1)
EOSINOPHIL # BLD: 0.26 K/UL (ref 0.05–0.5)
EOSINOPHILS RELATIVE PERCENT: 5 % (ref 0–6)
ERYTHROCYTE [DISTWIDTH] IN BLOOD BY AUTOMATED COUNT: 12.5 % (ref 11.5–15)
GFR SERPL CREATININE-BSD FRML MDRD: >60 ML/MIN/1.73M2
GLUCOSE SERPL-MCNC: 118 MG/DL (ref 74–99)
HBA1C MFR BLD: 6 % (ref 4–5.6)
HCT VFR BLD AUTO: 41.7 % (ref 34–48)
HGB BLD-MCNC: 13.7 G/DL (ref 11.5–15.5)
IMM GRANULOCYTES # BLD AUTO: <0.03 K/UL (ref 0–0.58)
IMM GRANULOCYTES NFR BLD: 0 % (ref 0–5)
LACTATE BLDV-SCNC: 2.2 MMOL/L (ref 0.5–2.2)
LYMPHOCYTES NFR BLD: 1.77 K/UL (ref 1.5–4)
LYMPHOCYTES RELATIVE PERCENT: 32 % (ref 20–42)
MCH RBC QN AUTO: 30.5 PG (ref 26–35)
MCHC RBC AUTO-ENTMCNC: 32.9 G/DL (ref 32–34.5)
MCV RBC AUTO: 92.9 FL (ref 80–99.9)
MONOCYTES NFR BLD: 0.55 K/UL (ref 0.1–0.95)
MONOCYTES NFR BLD: 10 % (ref 2–12)
NEUTROPHILS NFR BLD: 51 % (ref 43–80)
NEUTS SEG NFR BLD: 2.81 K/UL (ref 1.8–7.3)
PLATELET # BLD AUTO: 322 K/UL (ref 130–450)
PMV BLD AUTO: 9.8 FL (ref 7–12)
POTASSIUM SERPL-SCNC: 4.5 MMOL/L (ref 3.5–5)
PROT SERPL-MCNC: 7.3 G/DL (ref 6.4–8.3)
RBC # BLD AUTO: 4.49 M/UL (ref 3.5–5.5)
SODIUM SERPL-SCNC: 136 MMOL/L (ref 132–146)
TSH SERPL DL<=0.05 MIU/L-ACNC: 2.6 UIU/ML (ref 0.76–16.11)
WBC OTHER # BLD: 5.5 K/UL (ref 4.5–11.5)

## 2023-08-21 PROCEDURE — 80053 COMPREHEN METABOLIC PANEL: CPT

## 2023-08-21 PROCEDURE — 85025 COMPLETE CBC W/AUTO DIFF WBC: CPT

## 2023-08-21 PROCEDURE — 83036 HEMOGLOBIN GLYCOSYLATED A1C: CPT

## 2023-08-21 PROCEDURE — 36415 COLL VENOUS BLD VENIPUNCTURE: CPT

## 2023-08-21 PROCEDURE — 83605 ASSAY OF LACTIC ACID: CPT

## 2023-08-21 PROCEDURE — 84443 ASSAY THYROID STIM HORMONE: CPT

## 2023-08-23 ENCOUNTER — OFFICE VISIT (OUTPATIENT)
Dept: FAMILY MEDICINE CLINIC | Age: 33
End: 2023-08-23

## 2023-08-23 VITALS
TEMPERATURE: 98.1 F | HEIGHT: 66 IN | OXYGEN SATURATION: 97 % | HEART RATE: 89 BPM | WEIGHT: 293 LBS | BODY MASS INDEX: 47.09 KG/M2 | SYSTOLIC BLOOD PRESSURE: 112 MMHG | DIASTOLIC BLOOD PRESSURE: 80 MMHG | RESPIRATION RATE: 20 BRPM

## 2023-08-23 DIAGNOSIS — R79.89 LOW VITAMIN D LEVEL: ICD-10-CM

## 2023-08-23 DIAGNOSIS — E11.9 TYPE 2 DIABETES MELLITUS WITHOUT COMPLICATION, WITHOUT LONG-TERM CURRENT USE OF INSULIN (HCC): Primary | ICD-10-CM

## 2023-08-23 DIAGNOSIS — E78.5 DYSLIPIDEMIA: ICD-10-CM

## 2023-08-23 DIAGNOSIS — E66.01 MORBID OBESITY (HCC): ICD-10-CM

## 2023-08-23 DIAGNOSIS — G90.A POTS (POSTURAL ORTHOSTATIC TACHYCARDIA SYNDROME): ICD-10-CM

## 2023-08-23 DIAGNOSIS — R53.83 OTHER FATIGUE: ICD-10-CM

## 2023-08-23 DIAGNOSIS — I63.50 CEREBRAL ARTERY OCCLUSION WITH CEREBRAL INFARCTION (HCC): Chronic | ICD-10-CM

## 2023-08-23 DIAGNOSIS — D68.2 FACTOR V DEFICIENCY (HCC): ICD-10-CM

## 2023-08-23 RX ORDER — MULTIVIT-MIN/IRON/FOLIC ACID/K 18-600-40
1 CAPSULE ORAL DAILY
Qty: 90 CAPSULE | Refills: 1 | Status: SHIPPED | OUTPATIENT
Start: 2023-08-23

## 2023-08-23 RX ORDER — ONDANSETRON 4 MG/1
4 TABLET, ORALLY DISINTEGRATING ORAL 3 TIMES DAILY PRN
Qty: 21 TABLET | Refills: 0 | Status: SHIPPED | OUTPATIENT
Start: 2023-08-23

## 2023-08-23 RX ORDER — PANTOPRAZOLE SODIUM 40 MG/1
40 TABLET, DELAYED RELEASE ORAL DAILY
Qty: 90 TABLET | Refills: 1 | Status: SHIPPED | OUTPATIENT
Start: 2023-08-23

## 2023-08-23 RX ORDER — METOPROLOL SUCCINATE 25 MG/1
12.5 TABLET, EXTENDED RELEASE ORAL NIGHTLY
Qty: 90 TABLET | Refills: 1 | Status: SHIPPED | OUTPATIENT
Start: 2023-08-23

## 2023-08-23 RX ORDER — DULAGLUTIDE 1.5 MG/.5ML
1.5 INJECTION, SOLUTION SUBCUTANEOUS WEEKLY
Qty: 12 ADJUSTABLE DOSE PRE-FILLED PEN SYRINGE | Refills: 1 | Status: SHIPPED | OUTPATIENT
Start: 2023-08-23

## 2023-08-23 RX ORDER — FOLIC ACID 1 MG/1
TABLET ORAL
Qty: 90 TABLET | Refills: 1 | Status: SHIPPED | OUTPATIENT
Start: 2023-08-23

## 2023-08-23 RX ORDER — ALBUTEROL SULFATE 90 UG/1
2 AEROSOL, METERED RESPIRATORY (INHALATION) EVERY 6 HOURS PRN
Qty: 1 EACH | Refills: 5 | Status: SHIPPED | OUTPATIENT
Start: 2023-08-23

## 2023-08-23 ASSESSMENT — ENCOUNTER SYMPTOMS
PHOTOPHOBIA: 0
CONSTIPATION: 0
SINUS PAIN: 0
DIARRHEA: 0
EYE PAIN: 0
EYE ITCHING: 0
NAUSEA: 0
FACIAL SWELLING: 0
SHORTNESS OF BREATH: 0
STRIDOR: 0
COUGH: 0
VOICE CHANGE: 0
CHOKING: 0
BLURRED VISION: 0
RECTAL PAIN: 0
RHINORRHEA: 0
BACK PAIN: 1
SINUS PRESSURE: 0
ALLERGIC/IMMUNOLOGIC NEGATIVE: 1
ABDOMINAL PAIN: 0
ANAL BLEEDING: 0
APNEA: 0
BLOOD IN STOOL: 0
EYE REDNESS: 0
TROUBLE SWALLOWING: 0
VOMITING: 0
COLOR CHANGE: 0
EYE DISCHARGE: 0
ORTHOPNEA: 0
CHEST TIGHTNESS: 0
ABDOMINAL DISTENTION: 0
VISUAL CHANGE: 0
WHEEZING: 0
SORE THROAT: 0

## 2023-08-23 NOTE — PROGRESS NOTES
Jovani Desir is a 28 y.o. female. HPI/Chief C/O:  Chief Complaint   Patient presents with    Diabetes     Pt here for 3 month follow up  A1c done 8/21/2023  Micro albumin pended    Discuss Labs     Pt to discuss labs from 8/21/2023    Discuss Medications     Trulicity not working. Pt is adjusting her own meds. Numbness     Pt has numbness and tingling from elbows down to fingers for 2 weeks. Vitamin B12 not working. Started a week ago. Allergies   Allergen Reactions    Latex Dermatitis   The patient is here for a medication list and treatment planning review  We will go over our care planning goals as well as take care of all refills  We will set up labs as well        Diabetes  She presents for her follow-up diabetic visit. She has type 2 diabetes mellitus. Hypoglycemia symptoms include nervousness/anxiousness. Pertinent negatives for hypoglycemia include no confusion, dizziness, headaches, pallor, seizures, speech difficulty, sweats or tremors. Associated symptoms include fatigue, foot paresthesias and weakness. Pertinent negatives for diabetes include no blurred vision, no chest pain, no foot ulcerations, no polydipsia, no polyphagia, no polyuria, no visual change and no weight loss. There are no hypoglycemic complications. Pertinent negatives for diabetic complications include no autonomic neuropathy, CVA, heart disease, nephropathy, peripheral neuropathy, PVD or retinopathy. Risk factors for coronary artery disease include obesity, diabetes mellitus and stress. Current diabetic treatment includes diet (Trulicity). She is compliant with treatment some of the time. She is following a generally unhealthy diet. An ACE inhibitor/angiotensin II receptor blocker is contraindicated. Hypertension  This is a chronic problem. The current episode started more than 1 year ago. The problem is controlled. Associated symptoms include anxiety and malaise/fatigue.  Pertinent negatives include no

## 2023-09-06 ENCOUNTER — OFFICE VISIT (OUTPATIENT)
Dept: NEUROLOGY | Age: 33
End: 2023-09-06
Payer: MEDICARE

## 2023-09-06 VITALS
WEIGHT: 292 LBS | SYSTOLIC BLOOD PRESSURE: 112 MMHG | BODY MASS INDEX: 47.13 KG/M2 | DIASTOLIC BLOOD PRESSURE: 70 MMHG | HEART RATE: 95 BPM | TEMPERATURE: 97.6 F | OXYGEN SATURATION: 99 %

## 2023-09-06 DIAGNOSIS — I63.311 CEREBROVASCULAR ACCIDENT (CVA) DUE TO THROMBOSIS OF RIGHT MIDDLE CEREBRAL ARTERY (HCC): Primary | ICD-10-CM

## 2023-09-06 DIAGNOSIS — G43.109 CHRONIC MIGRAINE WITH AURA: ICD-10-CM

## 2023-09-06 PROBLEM — I69.30 PERSONAL HISTORY OF STROKE WITH RESIDUAL EFFECTS: Status: RESOLVED | Noted: 2021-02-07 | Resolved: 2023-09-06

## 2023-09-06 PROBLEM — R10.84 GENERALIZED ABDOMINAL PAIN: Status: RESOLVED | Noted: 2023-05-26 | Resolved: 2023-09-06

## 2023-09-06 PROCEDURE — G8417 CALC BMI ABV UP PARAM F/U: HCPCS | Performed by: NURSE PRACTITIONER

## 2023-09-06 PROCEDURE — 99214 OFFICE O/P EST MOD 30 MIN: CPT | Performed by: NURSE PRACTITIONER

## 2023-09-06 PROCEDURE — 1036F TOBACCO NON-USER: CPT | Performed by: NURSE PRACTITIONER

## 2023-09-06 PROCEDURE — G8427 DOCREV CUR MEDS BY ELIG CLIN: HCPCS | Performed by: NURSE PRACTITIONER

## 2023-09-06 RX ORDER — GABAPENTIN 100 MG/1
200 CAPSULE ORAL NIGHTLY PRN
Qty: 180 CAPSULE | Refills: 3 | Status: SHIPPED | OUTPATIENT
Start: 2023-09-06 | End: 2024-08-31

## 2023-09-06 NOTE — PROGRESS NOTES
[address] MSN, APRN-CNP, [address] [facility] , [address] [facility], [address] [place] [address]      [phone]       Office Follow Up                                 Carlito Longoria is a 28 y.o. female    We are following her for stroke and migraines    She presents alone and remains a good historian. Her left-sided weakness is unchanged and she will occasionally feel worsening weakness in her left leg causing her to fall--mainly when she is descending stairs. She is wearing an AFO but does not have a walker or cane. Gabapentin is helpful as needed for her paresthesias. She remains on anticoagulation and statin    Unfortunately, she could not tolerate Qulipta due to severe nausea and vomiting. She is having over 15 headache days per month, a few of which are severe. She frequently wakes up with a headache. Nurtec is effective for rescue. She has significantly reduced her use of OTC analgesia as advised. She denies any nocturnal choking or coughing. She does not know if she snores. No nocturia. She only gets 4 to 5 hours of sleep nightly due to her significant anxiety. She feels generalized fatigue but no daytime somnolence. Migraine medications failed or contraindicated: Topiramate; Emgality, Hazle Brightly, triptans are contraindicated with her history of stroke and her use of SSRIs. She is already on trazodone, metoprolol, Nurtec, gabapentin, Celexa. She follows with Dr. Lala Felix who has been treating her POTS  She is on naltrexone---the patient took herself off of Mestinon due to facial spasms.     No chest pain or palpitations  No SOB  No vertigo, lightheadedness or loss of consciousness  No incontinence of bowels or bladder  No itching or bruising appreciated  No speech or swallowing troubles    ROS otherwise negative     Medications:     Current Outpatient Medications   Medication Sig Dispense Refill    ondansetron (ZOFRAN-ODT) 4 MG disintegrating tablet Take

## 2023-09-13 RX ORDER — ATORVASTATIN CALCIUM 40 MG/1
40 TABLET, FILM COATED ORAL NIGHTLY
Qty: 90 TABLET | Refills: 0 | Status: SHIPPED | OUTPATIENT
Start: 2023-09-13

## 2023-09-13 NOTE — TELEPHONE ENCOUNTER
Last Appointment:  8/23/2023  Future Appointments   Date Time Provider [address]   11/29/2023 11:00 AM Moody Ramos DO [facility]

## 2023-09-20 ENCOUNTER — TELEPHONE (OUTPATIENT)
Dept: NEUROLOGY | Age: 33
End: 2023-09-20

## 2023-09-20 NOTE — TELEPHONE ENCOUNTER
Patient left us a voicemail, wanted to check in on how her Authorization is going for Botox, would like a call back to update at [phone]

## 2023-09-22 ENCOUNTER — TELEPHONE (OUTPATIENT)
Dept: FAMILY MEDICINE CLINIC | Age: 33
End: 2023-09-22

## 2023-09-22 RX ORDER — SEMAGLUTIDE 0.68 MG/ML
0.25 INJECTION, SOLUTION SUBCUTANEOUS WEEKLY
Qty: 3 ML | Refills: 1 | Status: SHIPPED | OUTPATIENT
Start: 2023-09-22 | End: 2024-03-20

## 2023-09-22 NOTE — TELEPHONE ENCOUNTER
Pt is requesting ozempic instead of trulicity. Please advise.      Electronically signed by Raine Montoya on 9/22/23 at 11:19 AM EDT

## 2023-09-25 ENCOUNTER — TELEPHONE (OUTPATIENT)
Dept: FAMILY MEDICINE CLINIC | Age: 33
End: 2023-09-25

## 2023-09-25 RX ORDER — VALACYCLOVIR HYDROCHLORIDE 1 G/1
1000 TABLET, FILM COATED ORAL 2 TIMES DAILY
Qty: 20 TABLET | Refills: 0 | Status: SHIPPED | OUTPATIENT
Start: 2023-09-25 | End: 2023-10-05

## 2023-09-25 NOTE — TELEPHONE ENCOUNTER
Pt called and states she has a rash all over her body and states they are raised up and her mother thinks it is shingles. Admits to it being on her neck. Admits to redness and warmth to the touch. Pt scheduled on Wednesday for vv, Please advise what to do.     Electronically signed by Raine Ocampo on 9/25/23 at 10:26 AM EDT

## 2023-09-28 NOTE — TELEPHONE ENCOUNTER
Patient is due to get new insurance coverage beginning in October. A prior auth was submitted to her current medicare/medicaid plan. I am awaiting a response from her current insurance company.

## 2023-10-05 ENCOUNTER — TELEPHONE (OUTPATIENT)
Dept: FAMILY MEDICINE CLINIC | Age: 33
End: 2023-10-05

## 2023-10-05 NOTE — TELEPHONE ENCOUNTER
Pt called the office asking for her prior auth status and this MA informed the pt we needed her new insurance Member ID number, BIN and PCN number. Pt states she will call and get those numbers and give us a call back as she does not have her insurance card yet.      Electronically signed by Raine Cook on 10/5/23 at 3:01 PM EDT

## 2023-10-05 NOTE — TELEPHONE ENCOUNTER
Patient called, no Terisa Jose is needed.  Her new insurance covers the [facility]

## 2023-10-05 NOTE — TELEPHONE ENCOUNTER
[employer]  ID: [id number]  BIN: [id number]  PCN: [id number]    Needs auth for the [facility]

## 2023-10-26 NOTE — TELEPHONE ENCOUNTER
Pt called to state she needs her monthly increase for her ozempic. Please advise.      Electronically signed by Raine Madison on 10/26/23 at 9:55 AM EDT

## 2023-11-27 ENCOUNTER — HOSPITAL ENCOUNTER (OUTPATIENT)
Age: 33
Discharge: HOME | End: 2023-11-27
Payer: MEDICARE

## 2023-11-27 DIAGNOSIS — E78.5 DYSLIPIDEMIA: ICD-10-CM

## 2023-11-27 DIAGNOSIS — E11.9 TYPE 2 DIABETES MELLITUS WITHOUT COMPLICATION, WITHOUT LONG-TERM CURRENT USE OF INSULIN (HCC): ICD-10-CM

## 2023-11-27 DIAGNOSIS — G90.A POTS (POSTURAL ORTHOSTATIC TACHYCARDIA SYNDROME): ICD-10-CM

## 2023-11-27 DIAGNOSIS — E66.01 MORBID OBESITY (HCC): ICD-10-CM

## 2023-11-27 DIAGNOSIS — R53.83 OTHER FATIGUE: ICD-10-CM

## 2023-11-27 DIAGNOSIS — I63.50 CEREBRAL ARTERY OCCLUSION WITH CEREBRAL INFARCTION (HCC): Chronic | ICD-10-CM

## 2023-11-27 DIAGNOSIS — R79.89 LOW VITAMIN D LEVEL: ICD-10-CM

## 2023-11-27 DIAGNOSIS — D68.2 FACTOR V DEFICIENCY (HCC): ICD-10-CM

## 2023-11-27 LAB
ANION GAP SERPL CALCULATED.3IONS-SCNC: 6 MMOL/L (ref 7–16)
BASOPHILS # BLD: 0.05 K/UL (ref 0–0.2)
BASOPHILS NFR BLD: 1 % (ref 0–2)
BUN SERPL-MCNC: 9 MG/DL (ref 6–20)
CALCIUM SERPL-MCNC: 9.4 MG/DL (ref 8.6–10.2)
CHLORIDE SERPL-SCNC: 101 MMOL/L (ref 98–107)
CHOLEST SERPL-MCNC: 140 MG/DL
CO2 SERPL-SCNC: 24 MMOL/L (ref 22–29)
CREAT SERPL-MCNC: 0.9 MG/DL (ref 0.5–1)
EOSINOPHIL # BLD: 0.17 K/UL (ref 0.05–0.5)
EOSINOPHILS RELATIVE PERCENT: 3 % (ref 0–6)
ERYTHROCYTE [DISTWIDTH] IN BLOOD BY AUTOMATED COUNT: 12.2 % (ref 11.5–15)
GFR SERPL CREATININE-BSD FRML MDRD: >60 ML/MIN/1.73M2
GLUCOSE SERPL-MCNC: 102 MG/DL (ref 74–99)
HBA1C MFR BLD: 5.7 % (ref 4–5.6)
HCT VFR BLD AUTO: 42.7 % (ref 34–48)
HDLC SERPL-MCNC: 50 MG/DL
HGB BLD-MCNC: 14.4 G/DL (ref 11.5–15.5)
IMM GRANULOCYTES # BLD AUTO: <0.03 K/UL (ref 0–0.58)
IMM GRANULOCYTES NFR BLD: 0 % (ref 0–5)
LACTATE BLDV-SCNC: 1.3 MMOL/L (ref 0.5–1.9)
LDLC SERPL CALC-MCNC: 56 MG/DL
LYMPHOCYTES NFR BLD: 1.64 K/UL (ref 1.5–4)
LYMPHOCYTES RELATIVE PERCENT: 30 % (ref 20–42)
MCH RBC QN AUTO: 31.3 PG (ref 26–35)
MCHC RBC AUTO-ENTMCNC: 33.7 G/DL (ref 32–34.5)
MCV RBC AUTO: 92.8 FL (ref 80–99.9)
MONOCYTES NFR BLD: 0.6 K/UL (ref 0.1–0.95)
MONOCYTES NFR BLD: 11 % (ref 2–12)
NEUTROPHILS NFR BLD: 55 % (ref 43–80)
NEUTS SEG NFR BLD: 3.07 K/UL (ref 1.8–7.3)
PLATELET # BLD AUTO: 338 K/UL (ref 130–450)
PMV BLD AUTO: 9.4 FL (ref 7–12)
POTASSIUM SERPL-SCNC: 4.4 MMOL/L (ref 3.5–5)
RBC # BLD AUTO: 4.6 M/UL (ref 3.5–5.5)
SODIUM SERPL-SCNC: 131 MMOL/L (ref 132–146)
TRIGL SERPL-MCNC: 170 MG/DL
TSH SERPL DL<=0.05 MIU/L-ACNC: 2.5 UIU/ML (ref 0.27–4.2)
URATE SERPL-MCNC: 6.5 MG/DL (ref 2.4–5.7)
VLDLC SERPL CALC-MCNC: 34 MG/DL
WBC OTHER # BLD: 5.6 K/UL (ref 4.5–11.5)

## 2023-11-27 PROCEDURE — 36415 COLL VENOUS BLD VENIPUNCTURE: CPT

## 2023-11-27 PROCEDURE — 83036 HEMOGLOBIN GLYCOSYLATED A1C: CPT

## 2023-11-27 PROCEDURE — 85025 COMPLETE CBC W/AUTO DIFF WBC: CPT

## 2023-11-27 PROCEDURE — 84443 ASSAY THYROID STIM HORMONE: CPT

## 2023-11-27 PROCEDURE — 83605 ASSAY OF LACTIC ACID: CPT

## 2023-11-27 PROCEDURE — 80048 BASIC METABOLIC PNL TOTAL CA: CPT

## 2023-11-27 PROCEDURE — 84550 ASSAY OF BLOOD/URIC ACID: CPT

## 2023-11-27 PROCEDURE — 80061 LIPID PANEL: CPT

## 2023-11-29 ENCOUNTER — OFFICE VISIT (OUTPATIENT)
Dept: FAMILY MEDICINE CLINIC | Age: 33
End: 2023-11-29
Payer: MEDICARE

## 2023-11-29 VITALS
HEART RATE: 69 BPM | HEIGHT: 66 IN | SYSTOLIC BLOOD PRESSURE: 124 MMHG | WEIGHT: 292.6 LBS | TEMPERATURE: 98 F | OXYGEN SATURATION: 97 % | BODY MASS INDEX: 47.02 KG/M2 | RESPIRATION RATE: 18 BRPM | DIASTOLIC BLOOD PRESSURE: 86 MMHG

## 2023-11-29 DIAGNOSIS — I63.50 CEREBRAL ARTERY OCCLUSION WITH CEREBRAL INFARCTION (HCC): Chronic | ICD-10-CM

## 2023-11-29 DIAGNOSIS — I63.311 CEREBROVASCULAR ACCIDENT (CVA) DUE TO THROMBOSIS OF RIGHT MIDDLE CEREBRAL ARTERY (HCC): ICD-10-CM

## 2023-11-29 DIAGNOSIS — D68.2 FACTOR V DEFICIENCY (HCC): ICD-10-CM

## 2023-11-29 DIAGNOSIS — E11.9 TYPE 2 DIABETES MELLITUS WITHOUT COMPLICATION, WITHOUT LONG-TERM CURRENT USE OF INSULIN (HCC): Primary | ICD-10-CM

## 2023-11-29 DIAGNOSIS — E66.01 MORBID OBESITY (HCC): ICD-10-CM

## 2023-11-29 DIAGNOSIS — G90.A POTS (POSTURAL ORTHOSTATIC TACHYCARDIA SYNDROME): ICD-10-CM

## 2023-11-29 DIAGNOSIS — R53.83 OTHER FATIGUE: ICD-10-CM

## 2023-11-29 DIAGNOSIS — Z86.73 H/O: CVA (CEREBROVASCULAR ACCIDENT): ICD-10-CM

## 2023-11-29 DIAGNOSIS — E78.5 DYSLIPIDEMIA: ICD-10-CM

## 2023-11-29 DIAGNOSIS — R79.89 LOW VITAMIN D LEVEL: ICD-10-CM

## 2023-11-29 PROBLEM — E11.59 TYPE 2 DIABETES MELLITUS WITH VASCULAR DISEASE (HCC): Status: ACTIVE | Noted: 2022-03-16

## 2023-11-29 PROCEDURE — 3044F HG A1C LEVEL LT 7.0%: CPT | Performed by: FAMILY MEDICINE

## 2023-11-29 PROCEDURE — 99214 OFFICE O/P EST MOD 30 MIN: CPT | Performed by: FAMILY MEDICINE

## 2023-11-29 RX ORDER — METOPROLOL SUCCINATE 25 MG/1
12.5 TABLET, EXTENDED RELEASE ORAL NIGHTLY
Qty: 90 TABLET | Refills: 1 | Status: SHIPPED | OUTPATIENT
Start: 2023-11-29

## 2023-11-29 RX ORDER — ONDANSETRON 4 MG/1
4 TABLET, ORALLY DISINTEGRATING ORAL 3 TIMES DAILY PRN
Qty: 21 TABLET | Refills: 0 | Status: SHIPPED | OUTPATIENT
Start: 2023-11-29

## 2023-11-29 RX ORDER — SEMAGLUTIDE 2.68 MG/ML
2 INJECTION, SOLUTION SUBCUTANEOUS WEEKLY
Qty: 15 ML | Refills: 1 | Status: SHIPPED | OUTPATIENT
Start: 2023-11-29

## 2023-11-29 RX ORDER — MULTIVIT-MIN/IRON/FOLIC ACID/K 18-600-40
1 CAPSULE ORAL DAILY
Qty: 90 CAPSULE | Refills: 1 | Status: SHIPPED | OUTPATIENT
Start: 2023-11-29

## 2023-11-29 RX ORDER — FOLIC ACID 1 MG/1
TABLET ORAL
Qty: 90 TABLET | Refills: 1 | Status: SHIPPED | OUTPATIENT
Start: 2023-11-29

## 2023-11-29 RX ORDER — ATORVASTATIN CALCIUM 40 MG/1
40 TABLET, FILM COATED ORAL NIGHTLY
Qty: 90 TABLET | Refills: 0 | Status: SHIPPED | OUTPATIENT
Start: 2023-11-29

## 2023-11-29 RX ORDER — PANTOPRAZOLE SODIUM 40 MG/1
40 TABLET, DELAYED RELEASE ORAL DAILY
Qty: 90 TABLET | Refills: 1 | Status: SHIPPED | OUTPATIENT
Start: 2023-11-29

## 2023-11-29 ASSESSMENT — ENCOUNTER SYMPTOMS
BLURRED VISION: 0
ABDOMINAL PAIN: 0
COLOR CHANGE: 0
CHEST TIGHTNESS: 0
EYE ITCHING: 0
EYE DISCHARGE: 0
SHORTNESS OF BREATH: 0
TROUBLE SWALLOWING: 0
VOMITING: 0
NAUSEA: 0
EYE REDNESS: 0
PHOTOPHOBIA: 0
ALLERGIC/IMMUNOLOGIC NEGATIVE: 1
BLOOD IN STOOL: 0
SINUS PAIN: 0
SINUS PRESSURE: 0
APNEA: 0
DIARRHEA: 0
VOICE CHANGE: 0
CHOKING: 0
STRIDOR: 0
ANAL BLEEDING: 0
EYE PAIN: 0
FACIAL SWELLING: 0
RECTAL PAIN: 0
BACK PAIN: 1
ORTHOPNEA: 0
WHEEZING: 0
SORE THROAT: 0
COUGH: 0
ABDOMINAL DISTENTION: 0
CONSTIPATION: 0
RHINORRHEA: 0

## 2023-11-29 NOTE — PROGRESS NOTES
mellitus, stress and smoking/tobacco exposure. Past treatments include lifestyle changes and beta blockers. The current treatment provides significant improvement. Compliance problems include exercise, diet and psychosocial issues. Hypertensive end-organ damage includes CVA. There is no history of angina, kidney disease, CAD/MI (leiden factor V), heart failure, left ventricular hypertrophy, PVD or retinopathy. Identifiable causes of hypertension include a thyroid problem. There is no history of chronic renal disease, coarctation of the aorta, hyperaldosteronism, hypercortisolism, hyperparathyroidism, a hypertension causing med, pheochromocytoma, renovascular disease or sleep apnea. Back Pain  This is a chronic problem. The current episode started more than 1 month ago. The problem occurs constantly. The problem has been gradually worsening since onset. The pain is present in the lumbar spine. The quality of the pain is described as shooting and stabbing. The pain is at a severity of 8/10. The pain is moderate. The pain is The same all the time. Stiffness is present All day. Associated symptoms include leg pain and weakness. Pertinent negatives include no abdominal pain, chest pain, dysuria, fever, headaches, numbness, paresis, paresthesias, pelvic pain, perianal numbness or weight loss. ROS:  Review of Systems   Constitutional:  Positive for fatigue and malaise/fatigue. Negative for activity change, appetite change, chills, diaphoresis, fever, unexpected weight change and weight loss. HENT:  Negative for congestion, dental problem, drooling, ear discharge, ear pain, facial swelling, hearing loss, mouth sores, nosebleeds, postnasal drip, rhinorrhea, sinus pressure, sinus pain, sneezing, sore throat, tinnitus, trouble swallowing and voice change. Eyes:  Negative for blurred vision, photophobia, pain, discharge, redness, itching and visual disturbance.    Respiratory:  Negative for apnea, cough, choking,

## 2023-12-20 ENCOUNTER — TELEPHONE (OUTPATIENT)
Dept: ADMINISTRATIVE | Age: 33
End: 2023-12-20

## 2023-12-26 NOTE — TELEPHONE ENCOUNTER
Ok to address when provider returns to office. Patients new insurance is now active and Botox was approved on 12/22/23. She has a 20 min appointment on 1/3/24 about neuropathy symptoms. Do you want to just administer her first injection at that time? Or schedule another day for her to come back? It is more convenient  for her to come to the [facility] office. Botox approval was scanned into media.

## 2023-12-29 RX ORDER — ATORVASTATIN CALCIUM 40 MG/1
40 TABLET, FILM COATED ORAL NIGHTLY
Qty: 90 TABLET | Refills: 0 | Status: SHIPPED | OUTPATIENT
Start: 2023-12-29

## 2023-12-29 NOTE — TELEPHONE ENCOUNTER
Last Appointment:  11/29/2023  Future Appointments   Date Time Provider [address]   1/3/2024  8:00 AM Hanny Franco, [address] [facility] Neurology -   5/29/2024  9:15 AM Farzad Ramos, DO [facility]

## 2024-01-03 PROBLEM — R20.0 NUMBNESS AND TINGLING IN BOTH HANDS: Status: ACTIVE | Noted: 2024-01-03

## 2024-01-03 PROBLEM — R20.2 NUMBNESS AND TINGLING IN BOTH HANDS: Status: ACTIVE | Noted: 2024-01-03

## 2024-02-08 ENCOUNTER — HOSPITAL ENCOUNTER (OUTPATIENT)
Dept: NEUROLOGY | Age: 34
Discharge: HOME | End: 2024-02-08
Payer: MEDICARE

## 2024-02-08 DIAGNOSIS — R20.0 NUMBNESS AND TINGLING IN BOTH HANDS: ICD-10-CM

## 2024-02-08 DIAGNOSIS — R20.2 NUMBNESS AND TINGLING IN BOTH HANDS: ICD-10-CM

## 2024-02-08 PROCEDURE — 95910 NRV CNDJ TEST 7-8 STUDIES: CPT

## 2024-02-08 NOTE — PROCEDURES
[facility] [place]  Electrodiagnostic Laboratory   Nicolas         Full Name: Gertrude Morales Gender: Female  MRN: [id number] YOB: 1990  Location: [facility] ()      Visit Date: 2/8/2024 09:37  Age: 33 Years 1 Months Old  Examining Physician: Dr. Ocampo   Referring Physician: KASSIDY Terry APRN- CNP   Technician: Moody Colindres   Weight 292 pounds; BMI 47  Notes: Numbness & Tingling both hands        Motor NCS      Nerve / Sites Lat. Amplitude Distance Velocity Temp.    ms mV cm m/s °C   R Median - APB      Wrist 7.19 11.2 8  33.4      Elbow 10.94 10.4 19 51 33.4   L Median - APB      Wrist 5.05 11.4 8  33.4      Elbow 9.06 9.5 19 47 33.3   R Ulnar - ADM      Wrist 2.60 6.0 8  34      B.Elbow 5.52 4.7 17 58 34      A.Elbow 7.08 4.0 10 64 33.9       Sensory NCS      Nerve / Sites Onset Lat Peak Lat PP Amp Distance Velocity Temp.    ms ms µV cm m/s °C   R Median - Digit II (Antidromic)      Mid Palm 2.03 2.55 6.0 7 34 33.9      Wrist NR NR NR 14 NR 33.9   L Median - Digit II (Antidromic)      Mid Palm 1.20 2.08 38.5 7 58 33      Wrist 4.17 5.16 23.1 14 34 33.1   R Ulnar - Digit V (Antidromic)      Wrist 2.34 3.23 31.3 14 60 33.8   R Radial - Anatomical snuff box (Forearm)      Forearm 1.67 2.29 29.6 10 60 33.5       F  Wave      Nerve F Lat M Lat F-M Lat    ms ms ms   R Median - APB 31.1 7.2 23.9   R Ulnar - ADM 25.7 3.1 22.6   L Median - APB 28.0 5.1 22.9       Nerve conduction studies in both arms disclosed moderate elongation of the distal latency of the right median nerve at the wrist, with minimal delay in the left median nerve.  Both median palmar latencies were delayed, with decreased orthodromic velocities on the right side.  Sensory responses were not obtained from the right median nerve, with delays in the distal sensory latencies on the left side, with decreased antidromic velocities.  Both median F-wave latencies were prolonged.    These findings were compared to the

## 2024-02-08 NOTE — PROGRESS NOTES
Outpatient EMG/NCV study completed.  Report will be forwarded upon completion.   Moody Colindres

## 2024-03-25 RX ORDER — ATORVASTATIN CALCIUM 40 MG/1
40 TABLET, FILM COATED ORAL NIGHTLY
Qty: 90 TABLET | Refills: 0 | Status: SHIPPED | OUTPATIENT
Start: 2024-03-25

## 2024-03-25 NOTE — TELEPHONE ENCOUNTER
Last seen 11/29/2023  Next appt 5/29/2024    Electronically signed by JANIS PERLA MA on 3/25/24 at 9:07 AM EDT

## 2024-04-15 ENCOUNTER — TELEPHONE (OUTPATIENT)
Dept: ADMINISTRATIVE | Age: 34
End: 2024-04-15

## 2024-04-15 NOTE — TELEPHONE ENCOUNTER
Pt called to reschedule her botox injection she missed on 4/3.  Staff unavailable due to pt care.  Please contact pt.

## 2024-05-28 ENCOUNTER — HOSPITAL ENCOUNTER (OUTPATIENT)
Age: 34
Discharge: HOME | End: 2024-05-28
Payer: MEDICARE

## 2024-05-28 DIAGNOSIS — D68.2 FACTOR V DEFICIENCY (HCC): ICD-10-CM

## 2024-05-28 DIAGNOSIS — G90.A POTS (POSTURAL ORTHOSTATIC TACHYCARDIA SYNDROME): ICD-10-CM

## 2024-05-28 DIAGNOSIS — I63.311 CEREBROVASCULAR ACCIDENT (CVA) DUE TO THROMBOSIS OF RIGHT MIDDLE CEREBRAL ARTERY (HCC): ICD-10-CM

## 2024-05-28 DIAGNOSIS — R79.89 LOW VITAMIN D LEVEL: ICD-10-CM

## 2024-05-28 DIAGNOSIS — E66.01 MORBID OBESITY (HCC): ICD-10-CM

## 2024-05-28 DIAGNOSIS — E78.5 DYSLIPIDEMIA: ICD-10-CM

## 2024-05-28 DIAGNOSIS — I63.50 CEREBRAL ARTERY OCCLUSION WITH CEREBRAL INFARCTION (HCC): Chronic | ICD-10-CM

## 2024-05-28 DIAGNOSIS — R53.83 OTHER FATIGUE: ICD-10-CM

## 2024-05-28 DIAGNOSIS — E11.9 TYPE 2 DIABETES MELLITUS WITHOUT COMPLICATION, WITHOUT LONG-TERM CURRENT USE OF INSULIN (HCC): ICD-10-CM

## 2024-05-28 LAB
ALBUMIN SERPL-MCNC: 4.3 G/DL (ref 3.5–5.2)
ALP SERPL-CCNC: 83 U/L (ref 35–104)
ALT SERPL-CCNC: 21 U/L (ref 0–32)
ANION GAP SERPL CALCULATED.3IONS-SCNC: 13 MMOL/L (ref 7–16)
AST SERPL-CCNC: 23 U/L (ref 0–31)
BASOPHILS # BLD: 0.05 K/UL (ref 0–0.2)
BASOPHILS NFR BLD: 1 % (ref 0–2)
BILIRUB SERPL-MCNC: 0.5 MG/DL (ref 0–1.2)
BUN SERPL-MCNC: 14 MG/DL (ref 6–20)
CALCIUM SERPL-MCNC: 9.9 MG/DL (ref 8.6–10.2)
CHLORIDE SERPL-SCNC: 103 MMOL/L (ref 98–107)
CHOLEST SERPL-MCNC: 209 MG/DL
CO2 SERPL-SCNC: 22 MMOL/L (ref 22–29)
CREAT SERPL-MCNC: 0.9 MG/DL (ref 0.5–1)
EOSINOPHIL # BLD: 0.12 K/UL (ref 0.05–0.5)
EOSINOPHILS RELATIVE PERCENT: 2 % (ref 0–6)
ERYTHROCYTE [DISTWIDTH] IN BLOOD BY AUTOMATED COUNT: 11.9 % (ref 11.5–15)
GFR, ESTIMATED: >90 ML/MIN/1.73M2
GLUCOSE SERPL-MCNC: 90 MG/DL (ref 74–99)
HBA1C MFR BLD: 5.3 % (ref 4–5.6)
HCT VFR BLD AUTO: 42.3 % (ref 34–48)
HDLC SERPL-MCNC: 48 MG/DL
HGB BLD-MCNC: 14.5 G/DL (ref 11.5–15.5)
IMM GRANULOCYTES # BLD AUTO: <0.03 K/UL (ref 0–0.58)
IMM GRANULOCYTES NFR BLD: 0 % (ref 0–5)
LDLC SERPL CALC-MCNC: 122 MG/DL
LYMPHOCYTES NFR BLD: 1.67 K/UL (ref 1.5–4)
LYMPHOCYTES RELATIVE PERCENT: 25 % (ref 20–42)
MCH RBC QN AUTO: 31.5 PG (ref 26–35)
MCHC RBC AUTO-ENTMCNC: 34.3 G/DL (ref 32–34.5)
MCV RBC AUTO: 91.8 FL (ref 80–99.9)
MONOCYTES NFR BLD: 0.51 K/UL (ref 0.1–0.95)
MONOCYTES NFR BLD: 8 % (ref 2–12)
NEUTROPHILS NFR BLD: 64 % (ref 43–80)
NEUTS SEG NFR BLD: 4.27 K/UL (ref 1.8–7.3)
PLATELET # BLD AUTO: 337 K/UL (ref 130–450)
PMV BLD AUTO: 9.2 FL (ref 7–12)
POTASSIUM SERPL-SCNC: 4.8 MMOL/L (ref 3.5–5)
PROT SERPL-MCNC: 7.9 G/DL (ref 6.4–8.3)
RBC # BLD AUTO: 4.61 M/UL (ref 3.5–5.5)
SODIUM SERPL-SCNC: 138 MMOL/L (ref 132–146)
TRIGL SERPL-MCNC: 193 MG/DL
TSH SERPL DL<=0.05 MIU/L-ACNC: 1.57 UIU/ML (ref 0.27–4.2)
URATE SERPL-MCNC: 6.6 MG/DL (ref 2.4–5.7)
VLDLC SERPL CALC-MCNC: 39 MG/DL
WBC OTHER # BLD: 6.6 K/UL (ref 4.5–11.5)

## 2024-05-28 PROCEDURE — 80053 COMPREHEN METABOLIC PANEL: CPT

## 2024-05-28 PROCEDURE — 84550 ASSAY OF BLOOD/URIC ACID: CPT

## 2024-05-28 PROCEDURE — 85025 COMPLETE CBC W/AUTO DIFF WBC: CPT

## 2024-05-28 PROCEDURE — 36415 COLL VENOUS BLD VENIPUNCTURE: CPT

## 2024-05-28 PROCEDURE — 84443 ASSAY THYROID STIM HORMONE: CPT

## 2024-05-28 PROCEDURE — 80061 LIPID PANEL: CPT

## 2024-05-28 PROCEDURE — 83036 HEMOGLOBIN GLYCOSYLATED A1C: CPT

## 2024-05-29 ENCOUNTER — OFFICE VISIT (OUTPATIENT)
Dept: FAMILY MEDICINE CLINIC | Age: 34
End: 2024-05-29

## 2024-05-29 VITALS
BODY MASS INDEX: 39.63 KG/M2 | HEIGHT: 66 IN | RESPIRATION RATE: 18 BRPM | HEART RATE: 79 BPM | TEMPERATURE: 97.8 F | DIASTOLIC BLOOD PRESSURE: 68 MMHG | SYSTOLIC BLOOD PRESSURE: 100 MMHG | OXYGEN SATURATION: 97 % | WEIGHT: 246.6 LBS

## 2024-05-29 DIAGNOSIS — E66.01 MORBID OBESITY (HCC): ICD-10-CM

## 2024-05-29 DIAGNOSIS — G90.A POTS (POSTURAL ORTHOSTATIC TACHYCARDIA SYNDROME): ICD-10-CM

## 2024-05-29 DIAGNOSIS — D68.2 FACTOR V DEFICIENCY (HCC): ICD-10-CM

## 2024-05-29 DIAGNOSIS — R79.89 LOW VITAMIN D LEVEL: ICD-10-CM

## 2024-05-29 DIAGNOSIS — E78.49 OTHER HYPERLIPIDEMIA: ICD-10-CM

## 2024-05-29 DIAGNOSIS — E11.59 TYPE 2 DIABETES MELLITUS WITH VASCULAR DISEASE (HCC): Primary | ICD-10-CM

## 2024-05-29 DIAGNOSIS — J45.20 MILD INTERMITTENT ASTHMA WITHOUT COMPLICATION: ICD-10-CM

## 2024-05-29 DIAGNOSIS — I63.50 CEREBRAL ARTERY OCCLUSION WITH CEREBRAL INFARCTION (HCC): Chronic | ICD-10-CM

## 2024-05-29 RX ORDER — SEMAGLUTIDE 2.68 MG/ML
2 INJECTION, SOLUTION SUBCUTANEOUS WEEKLY
Qty: 15 ML | Refills: 1 | Status: SHIPPED | OUTPATIENT
Start: 2024-05-29

## 2024-05-29 RX ORDER — ONDANSETRON 4 MG/1
4 TABLET, ORALLY DISINTEGRATING ORAL 3 TIMES DAILY PRN
Qty: 21 TABLET | Refills: 0 | Status: SHIPPED | OUTPATIENT
Start: 2024-05-29

## 2024-05-29 RX ORDER — MULTIVIT-MIN/IRON/FOLIC ACID/K 18-600-40
1 CAPSULE ORAL DAILY
Qty: 90 CAPSULE | Refills: 1 | Status: SHIPPED | OUTPATIENT
Start: 2024-05-29

## 2024-05-29 RX ORDER — FOLIC ACID 1 MG/1
TABLET ORAL
Qty: 90 TABLET | Refills: 1 | Status: SHIPPED | OUTPATIENT
Start: 2024-05-29

## 2024-05-29 RX ORDER — CITALOPRAM 20 MG/1
30 TABLET ORAL DAILY
Qty: 90 TABLET | Refills: 1
Start: 2024-05-29

## 2024-05-29 RX ORDER — PANTOPRAZOLE SODIUM 40 MG/1
40 TABLET, DELAYED RELEASE ORAL DAILY
Qty: 90 TABLET | Refills: 1 | Status: SHIPPED | OUTPATIENT
Start: 2024-05-29

## 2024-05-29 RX ORDER — ATORVASTATIN CALCIUM 40 MG/1
40 TABLET, FILM COATED ORAL NIGHTLY
Qty: 90 TABLET | Refills: 0 | Status: CANCELLED | OUTPATIENT
Start: 2024-05-29

## 2024-05-29 RX ORDER — METOPROLOL SUCCINATE 25 MG/1
12.5 TABLET, EXTENDED RELEASE ORAL NIGHTLY
Qty: 90 TABLET | Refills: 1 | Status: SHIPPED | OUTPATIENT
Start: 2024-05-29

## 2024-05-29 SDOH — ECONOMIC STABILITY: FOOD INSECURITY: WITHIN THE PAST 12 MONTHS, YOU WORRIED THAT YOUR FOOD WOULD RUN OUT BEFORE YOU GOT MONEY TO BUY MORE.: NEVER TRUE

## 2024-05-29 SDOH — ECONOMIC STABILITY: FOOD INSECURITY: WITHIN THE PAST 12 MONTHS, THE FOOD YOU BOUGHT JUST DIDN'T LAST AND YOU DIDN'T HAVE MONEY TO GET MORE.: NEVER TRUE

## 2024-05-29 SDOH — ECONOMIC STABILITY: INCOME INSECURITY: HOW HARD IS IT FOR YOU TO PAY FOR THE VERY BASICS LIKE FOOD, HOUSING, MEDICAL CARE, AND HEATING?: NOT HARD AT ALL

## 2024-05-29 ASSESSMENT — ENCOUNTER SYMPTOMS
CHEST TIGHTNESS: 0
ABDOMINAL DISTENTION: 0
SHORTNESS OF BREATH: 0
ALLERGIC/IMMUNOLOGIC NEGATIVE: 1
VOICE CHANGE: 0
BLURRED VISION: 0
STRIDOR: 0
COLOR CHANGE: 0
VOMITING: 0
RHINORRHEA: 0
BLOOD IN STOOL: 0
CHOKING: 0
EYE DISCHARGE: 0
SINUS PRESSURE: 0
PHOTOPHOBIA: 0
SORE THROAT: 0
WHEEZING: 0
TROUBLE SWALLOWING: 0
COUGH: 0
BACK PAIN: 1
EYE ITCHING: 0
DIARRHEA: 0
RECTAL PAIN: 0
ORTHOPNEA: 0
NAUSEA: 0
APNEA: 0
ABDOMINAL PAIN: 0
CONSTIPATION: 0
SINUS PAIN: 0
ANAL BLEEDING: 0
EYE REDNESS: 0
VISUAL CHANGE: 0
FACIAL SWELLING: 0
EYE PAIN: 0

## 2024-05-29 ASSESSMENT — PATIENT HEALTH QUESTIONNAIRE - PHQ9
SUM OF ALL RESPONSES TO PHQ QUESTIONS 1-9: 0
1. LITTLE INTEREST OR PLEASURE IN DOING THINGS: NOT AT ALL
2. FEELING DOWN, DEPRESSED OR HOPELESS: NOT AT ALL
SUM OF ALL RESPONSES TO PHQ QUESTIONS 1-9: 0
SUM OF ALL RESPONSES TO PHQ9 QUESTIONS 1 & 2: 0
SUM OF ALL RESPONSES TO PHQ QUESTIONS 1-9: 0
SUM OF ALL RESPONSES TO PHQ QUESTIONS 1-9: 0

## 2024-05-29 NOTE — PROGRESS NOTES
once a week 15 mL 1    Vitamin D, Cholecalciferol, 50 MCG (2000 UT) CAPS Take 1 capsule by mouth daily 90 capsule 1    citalopram (CELEXA) 20 MG tablet Take 1.5 tablets by mouth daily Takes 2 tabs.   40 mg total 90 tablet 1    Rimegepant Sulfate 75 MG TBDP Take 75 mg by mouth daily as needed (migraine) No more than 1 tab in 24 hours 8 tablet 11    albuterol sulfate HFA (VENTOLIN HFA) 108 (90 Base) MCG/ACT inhaler Inhale 2 puffs into the lungs every 6 hours as needed for Wheezing 1 each 5    Handicap Placard MISC by Does not apply route It is my medical opinion that Gertrude Morales requires a disability parking placard for the following reasons:  She cannot walk 200 feet without stopping to rest.  Duration of need: 5 years 1 each 0    traZODone (DESYREL) 100 MG tablet Take 1 tablet by mouth nightly      tiZANidine (ZANAFLEX) 2 MG tablet TAKE 1 TABLET BY MOUTH ONCE DAILY AS NEEDED FOR SPASMS      NALTREXONE HCL PO Take by mouth      [DISCONTINUED] atorvastatin (LIPITOR) 40 MG tablet Take 1 tablet by mouth nightly 90 tablet 0    [DISCONTINUED] apixaban (ELIQUIS) 5 MG TABS tablet Take 1 tablet by mouth 2 times daily 180 tablet 1    [DISCONTINUED] folic acid (FOLVITE) 1 MG tablet One a day 90 tablet 1    [DISCONTINUED] metoprolol succinate (TOPROL XL) 25 MG extended release tablet Take 0.5 tablets by mouth nightly 90 tablet 1    [DISCONTINUED] ondansetron (ZOFRAN-ODT) 4 MG disintegrating tablet Take 1 tablet by mouth 3 times daily as needed for Nausea or Vomiting 21 tablet 0    [DISCONTINUED] pantoprazole (PROTONIX) 40 MG tablet Take 1 tablet by mouth daily 90 tablet 1    [DISCONTINUED] Vitamin D, Cholecalciferol, 50 MCG (2000 UT) CAPS Take 1 capsule by mouth daily 90 capsule 1    [DISCONTINUED] Semaglutide, 2 MG/DOSE, (OZEMPIC, 2 MG/DOSE,) 8 MG/3ML SOPN Inject 2 mg into the skin once a week 15 mL 1    [DISCONTINUED] citalopram (CELEXA) 20 MG tablet Take 1.5 tablets by mouth daily (Patient taking differently: Take 1.5

## 2024-06-18 ENCOUNTER — TELEPHONE (OUTPATIENT)
Dept: FAMILY MEDICINE CLINIC | Age: 34
End: 2024-06-18

## 2024-06-18 DIAGNOSIS — E11.59 TYPE 2 DIABETES MELLITUS WITH VASCULAR DISEASE (HCC): Primary | ICD-10-CM

## 2024-06-18 RX ORDER — TIRZEPATIDE 2.5 MG/.5ML
2.5 INJECTION, SOLUTION SUBCUTANEOUS WEEKLY
Qty: 6 ML | Refills: 1 | Status: SHIPPED | OUTPATIENT
Start: 2024-06-18

## 2024-06-18 NOTE — TELEPHONE ENCOUNTER
Patient called and states that the last month while on ozempic she has not lost any weight, she has not changed her diet or exercise and would like to know if you can switch her to monjaro or higher dose of ozempic

## 2024-07-06 NOTE — TELEPHONE ENCOUNTER
Janet Mclaughlin     Key: [id number]    PA Case ID: [id number]    Outcome  Approved    VENTOLIN HFA AER is approved through 12/31/2021. Your patient may now fill this prescription and it will be covered.     Drug  Ventolin  (90 Base)MCG/ACT aerosol    Form  [employer] Medicare Part D Electronic Prior Authorization Form (2017 NCPDP)    Electronically signed by eMra Herndon MA on 4/12/21 at 11:47 AM EDT DISPLAY PLAN FREE TEXT

## 2024-07-08 RX ORDER — TIRZEPATIDE 5 MG/.5ML
5 INJECTION, SOLUTION SUBCUTANEOUS WEEKLY
Qty: 6 ML | Refills: 0 | Status: SHIPPED | OUTPATIENT
Start: 2024-07-08

## 2024-07-16 RX ORDER — ATORVASTATIN CALCIUM 40 MG/1
40 TABLET, FILM COATED ORAL NIGHTLY
Qty: 90 TABLET | Refills: 0 | OUTPATIENT
Start: 2024-07-16

## 2024-08-05 RX ORDER — TIRZEPATIDE 5 MG/.5ML
5 INJECTION, SOLUTION SUBCUTANEOUS WEEKLY
Qty: 6 ML | Refills: 0 | Status: SHIPPED | OUTPATIENT
Start: 2024-08-05

## 2024-08-08 ENCOUNTER — TELEPHONE (OUTPATIENT)
Dept: FAMILY MEDICINE CLINIC | Age: 34
End: 2024-08-08

## 2024-08-08 NOTE — TELEPHONE ENCOUNTER
Pt had requested an increase in her Mounjaro dosage to 7.5mg from 5mg. The dosage was not increased when sent to the pharmacy. Are you agreeable with sending in the higher dose of 7.5mg?      Electronically signed by Lissett Garcia LPN on 8/8/24 at 2:41 PM EDT

## 2024-08-12 ENCOUNTER — TELEPHONE (OUTPATIENT)
Dept: FAMILY MEDICINE CLINIC | Age: 34
End: 2024-08-12

## 2024-08-12 NOTE — TELEPHONE ENCOUNTER
Pt notified of new order for Mounjaro was sent to the pharmacy     Electronically signed by Lissett Garcia LPN on 8/12/24 at 1:03 PM EDT

## 2024-08-23 ENCOUNTER — TELEPHONE (OUTPATIENT)
Dept: FAMILY MEDICINE CLINIC | Age: 34
End: 2024-08-23

## 2024-08-23 NOTE — TELEPHONE ENCOUNTER
Called received from [facility]. Pt has bulge on her upper stomach about the size of her fist. Pt has bad pain in her abdomen when bend over or when engaging her abs. She can feel a hard mass. Pt would like a CT or something ordered to be done prior to having her VV on Sept 3rd.  Please advise.     Electronically signed by Lissett Garcia LPN on 8/23/24 at 4:42 PM EDT

## 2024-08-23 NOTE — TELEPHONE ENCOUNTER
----- Message from Karlene GONSALES sent at 8/23/2024  2:17 PM EDT -----  Regarding: ECC Escalation To Practice  ECC Escalation To Practice      Type of Escalation: Acute Care Symptom  --------------------------------------------------------------------------------------------------------------------------    Information for Provider:Moody Ramos  Patient is looking for appointment for: Symptom abdominal pain - experiencing discomfort  and painful above on her belly.  Reasons for Message: Unable to reach practice     Additional Information   --------------------------------------------------------------------------------------------------------------------------    Relationship to Patient: Self     Call Back Info: OK to leave message on voicemail  Preferred Call Back Number: Phone [phone] (home)

## 2024-08-27 ENCOUNTER — TELEPHONE (OUTPATIENT)
Dept: ADMINISTRATIVE | Age: 34
End: 2024-08-27

## 2024-08-27 NOTE — TELEPHONE ENCOUNTER
Pt cancelled botox injection for today and is requesting to reschedule at the [facility] office.  Staff unavailable due to pt care.  Please contact pt.

## 2024-09-03 RX ORDER — TIRZEPATIDE 10 MG/.5ML
10 INJECTION, SOLUTION SUBCUTANEOUS WEEKLY
Qty: 2 ML | Refills: 0 | Status: SHIPPED | OUTPATIENT
Start: 2024-09-03 | End: 2024-10-03

## 2024-09-03 NOTE — TELEPHONE ENCOUNTER
Pt called and states she needs to increase her mounjaro to 10 mg, please advise.     Electronically signed by JANIS PERLA MA on 9/3/24 at 11:35 AM EDT

## 2024-09-10 ENCOUNTER — HOSPITAL ENCOUNTER (OUTPATIENT)
Age: 34
Discharge: HOME | End: 2024-09-10
Payer: MEDICARE

## 2024-09-10 ENCOUNTER — TELEPHONE (OUTPATIENT)
Dept: FAMILY MEDICINE CLINIC | Age: 34
End: 2024-09-10

## 2024-09-10 DIAGNOSIS — E11.59 TYPE 2 DIABETES MELLITUS WITH VASCULAR DISEASE (HCC): ICD-10-CM

## 2024-09-10 DIAGNOSIS — E66.01 MORBID OBESITY (HCC): ICD-10-CM

## 2024-09-10 DIAGNOSIS — G90.A POTS (POSTURAL ORTHOSTATIC TACHYCARDIA SYNDROME): ICD-10-CM

## 2024-09-10 DIAGNOSIS — K42.9 UMBILICAL HERNIA WITHOUT OBSTRUCTION AND WITHOUT GANGRENE: ICD-10-CM

## 2024-09-10 DIAGNOSIS — D68.2 FACTOR V DEFICIENCY (HCC): ICD-10-CM

## 2024-09-10 DIAGNOSIS — J45.20 MILD INTERMITTENT ASTHMA WITHOUT COMPLICATION: ICD-10-CM

## 2024-09-10 DIAGNOSIS — R79.89 LOW VITAMIN D LEVEL: ICD-10-CM

## 2024-09-10 DIAGNOSIS — I63.50 CEREBRAL ARTERY OCCLUSION WITH CEREBRAL INFARCTION (HCC): Chronic | ICD-10-CM

## 2024-09-10 DIAGNOSIS — R10.10 PAIN OF UPPER ABDOMEN: Primary | ICD-10-CM

## 2024-09-10 DIAGNOSIS — E78.49 OTHER HYPERLIPIDEMIA: ICD-10-CM

## 2024-09-10 LAB
ANION GAP SERPL CALCULATED.3IONS-SCNC: 10 MMOL/L (ref 7–16)
BASOPHILS # BLD: 0.06 K/UL (ref 0–0.2)
BASOPHILS NFR BLD: 1 % (ref 0–2)
BUN SERPL-MCNC: 11 MG/DL (ref 6–20)
CALCIUM SERPL-MCNC: 9.9 MG/DL (ref 8.6–10.2)
CHLORIDE SERPL-SCNC: 102 MMOL/L (ref 98–107)
CHOLEST SERPL-MCNC: 250 MG/DL
CO2 SERPL-SCNC: 25 MMOL/L (ref 22–29)
CREAT SERPL-MCNC: 0.8 MG/DL (ref 0.5–1)
EOSINOPHIL # BLD: 0.12 K/UL (ref 0.05–0.5)
EOSINOPHILS RELATIVE PERCENT: 2 % (ref 0–6)
ERYTHROCYTE [DISTWIDTH] IN BLOOD BY AUTOMATED COUNT: 11.9 % (ref 11.5–15)
GFR, ESTIMATED: >90 ML/MIN/1.73M2
GLUCOSE SERPL-MCNC: 89 MG/DL (ref 74–99)
HBA1C MFR BLD: 5.2 % (ref 4–5.6)
HCT VFR BLD AUTO: 42.5 % (ref 34–48)
HDLC SERPL-MCNC: 49 MG/DL
HGB BLD-MCNC: 14.5 G/DL (ref 11.5–15.5)
IMM GRANULOCYTES # BLD AUTO: <0.03 K/UL (ref 0–0.58)
IMM GRANULOCYTES NFR BLD: 0 % (ref 0–5)
LDLC SERPL CALC-MCNC: 159 MG/DL
LYMPHOCYTES NFR BLD: 2.19 K/UL (ref 1.5–4)
LYMPHOCYTES RELATIVE PERCENT: 33 % (ref 20–42)
MCH RBC QN AUTO: 31.6 PG (ref 26–35)
MCHC RBC AUTO-ENTMCNC: 34.1 G/DL (ref 32–34.5)
MCV RBC AUTO: 92.6 FL (ref 80–99.9)
MONOCYTES NFR BLD: 0.56 K/UL (ref 0.1–0.95)
MONOCYTES NFR BLD: 8 % (ref 2–12)
NEUTROPHILS NFR BLD: 56 % (ref 43–80)
NEUTS SEG NFR BLD: 3.72 K/UL (ref 1.8–7.3)
PLATELET # BLD AUTO: 338 K/UL (ref 130–450)
PMV BLD AUTO: 9.1 FL (ref 7–12)
POTASSIUM SERPL-SCNC: 4.3 MMOL/L (ref 3.5–5)
RBC # BLD AUTO: 4.59 M/UL (ref 3.5–5.5)
SODIUM SERPL-SCNC: 137 MMOL/L (ref 132–146)
TRIGL SERPL-MCNC: 212 MG/DL
VLDLC SERPL CALC-MCNC: 42 MG/DL
WBC OTHER # BLD: 6.7 K/UL (ref 4.5–11.5)

## 2024-09-10 PROCEDURE — 80061 LIPID PANEL: CPT

## 2024-09-10 PROCEDURE — 83036 HEMOGLOBIN GLYCOSYLATED A1C: CPT

## 2024-09-10 PROCEDURE — 80048 BASIC METABOLIC PNL TOTAL CA: CPT

## 2024-09-10 PROCEDURE — 85025 COMPLETE CBC W/AUTO DIFF WBC: CPT

## 2024-09-10 PROCEDURE — 36415 COLL VENOUS BLD VENIPUNCTURE: CPT

## 2024-09-18 ENCOUNTER — OFFICE VISIT (OUTPATIENT)
Dept: NEUROLOGY | Age: 34
End: 2024-09-18

## 2024-09-18 VITALS — HEART RATE: 79 BPM | DIASTOLIC BLOOD PRESSURE: 75 MMHG | OXYGEN SATURATION: 100 % | SYSTOLIC BLOOD PRESSURE: 117 MMHG

## 2024-09-18 DIAGNOSIS — G43.719 CHRONIC MIGRAINE WITHOUT AURA, INTRACTABLE, WITHOUT STATUS MIGRAINOSUS: Primary | ICD-10-CM

## 2024-09-27 ENCOUNTER — OFFICE VISIT (OUTPATIENT)
Dept: FAMILY MEDICINE CLINIC | Age: 34
End: 2024-09-27

## 2024-09-27 VITALS
HEART RATE: 83 BPM | HEIGHT: 66 IN | SYSTOLIC BLOOD PRESSURE: 100 MMHG | BODY MASS INDEX: 38.12 KG/M2 | WEIGHT: 237.2 LBS | RESPIRATION RATE: 18 BRPM | TEMPERATURE: 98 F | OXYGEN SATURATION: 98 % | DIASTOLIC BLOOD PRESSURE: 74 MMHG

## 2024-09-27 DIAGNOSIS — D68.2 FACTOR V DEFICIENCY (HCC): ICD-10-CM

## 2024-09-27 DIAGNOSIS — E11.59 TYPE 2 DIABETES MELLITUS WITH VASCULAR DISEASE (HCC): Primary | ICD-10-CM

## 2024-09-27 DIAGNOSIS — Z86.73 H/O: CVA (CEREBROVASCULAR ACCIDENT): ICD-10-CM

## 2024-09-27 DIAGNOSIS — I10 PRIMARY HYPERTENSION: ICD-10-CM

## 2024-09-27 DIAGNOSIS — K42.9 UMBILICAL HERNIA WITHOUT OBSTRUCTION AND WITHOUT GANGRENE: ICD-10-CM

## 2024-09-27 DIAGNOSIS — E66.01 MORBID OBESITY: ICD-10-CM

## 2024-09-27 RX ORDER — TIRZEPATIDE 12.5 MG/.5ML
12.5 INJECTION, SOLUTION SUBCUTANEOUS WEEKLY
Qty: 6 ML | Refills: 1 | Status: SHIPPED | OUTPATIENT
Start: 2024-09-27

## 2024-09-27 ASSESSMENT — ENCOUNTER SYMPTOMS
COUGH: 0
SINUS PRESSURE: 0
SINUS PAIN: 0
EYE REDNESS: 0
VOICE CHANGE: 0
FACIAL SWELLING: 0
SHORTNESS OF BREATH: 0
EYE DISCHARGE: 0
ABDOMINAL DISTENTION: 0
CONSTIPATION: 0
RHINORRHEA: 0
ANAL BLEEDING: 0
RECTAL PAIN: 0
WHEEZING: 0
PHOTOPHOBIA: 0
EYE ITCHING: 0
ABDOMINAL PAIN: 0
BLURRED VISION: 0
NAUSEA: 0
APNEA: 0
CHOKING: 0
SORE THROAT: 0
COLOR CHANGE: 0
ALLERGIC/IMMUNOLOGIC NEGATIVE: 1
BACK PAIN: 1
BLOOD IN STOOL: 0
ORTHOPNEA: 0
CHEST TIGHTNESS: 0
EYE PAIN: 0
STRIDOR: 0
VOMITING: 0
VISUAL CHANGE: 0
TROUBLE SWALLOWING: 0
DIARRHEA: 0

## 2024-10-08 ENCOUNTER — TELEPHONE (OUTPATIENT)
Dept: SURGERY | Age: 34
End: 2024-10-08

## 2024-10-08 ENCOUNTER — OFFICE VISIT (OUTPATIENT)
Dept: SURGERY | Age: 34
End: 2024-10-08
Payer: MEDICARE

## 2024-10-08 VITALS
HEIGHT: 66 IN | TEMPERATURE: 97 F | SYSTOLIC BLOOD PRESSURE: 105 MMHG | DIASTOLIC BLOOD PRESSURE: 71 MMHG | HEART RATE: 71 BPM | BODY MASS INDEX: 38.29 KG/M2

## 2024-10-08 DIAGNOSIS — K42.0 UMBILICAL HERNIA WITH OBSTRUCTION, WITHOUT GANGRENE: Primary | ICD-10-CM

## 2024-10-08 PROBLEM — K43.2 INCISIONAL HERNIA: Status: ACTIVE | Noted: 2024-10-08

## 2024-10-08 PROCEDURE — 3074F SYST BP LT 130 MM HG: CPT | Performed by: SURGERY

## 2024-10-08 PROCEDURE — 99213 OFFICE O/P EST LOW 20 MIN: CPT | Performed by: SURGERY

## 2024-10-08 PROCEDURE — 3078F DIAST BP <80 MM HG: CPT | Performed by: SURGERY

## 2024-10-08 NOTE — PROGRESS NOTES
General Surgery History and Physical  [facility]    Patient's Name/Date of Birth: Gertrude Morales / 1990    Date: 2024     Surgeon: Aure Roldan MD    PCP: Moody Ramos DO     Chief Complaint: Abdominal hernia    HPI:   Gertrude Morales is a 33 y.o. female who presents for evaluation of umbilical hernia with pain. Timing is constant, radiation to mid abdomen, alleviated by rest and started several weeks ago and severity is 8/10. she denies history of multiple abdominal surgeries. she had prior open supra umbilical hernia repair performed at the time of robotic bilateral inguinal hernia repair.  This was done 2 years ago.. Denies nausea, vomiting, fever, chills, SOB, chest pain, diarrheal constipation.    Patient Active Problem List   Diagnosis    POTS (postural orthostatic tachycardia syndrome)    Hyperlipidemia    Factor V deficiency (HCC)    Mild intermittent asthma without complication    Anxiety    Hashimoto's thyroiditis    Lumbar radiculopathy    Morbid obesity    Chronic migraine with aura    Type 2 diabetes mellitus with vascular disease (HCC)    Non-recurrent unilateral inguinal hernia without obstruction or gangrene    Umbilical hernia with obstruction, without gangrene    Bilateral inguinal hernia    H/O: CVA (cerebrovascular accident)    Numbness and tingling in both hands    Primary hypertension       Past Medical History:   Diagnosis Date    Anemia     Anxiety     Asthma     Blood clot embolism during pregnancy, antepartum     Cerebrovascular accident (CVA) due to vascular occlusion (HCC)      delivery delivered 2021    Chronic migraine     Coagulopathy (HCC)     Diabetes mellitus (HCC)     gestational - no current issues as of 10-17-22    DVT of leg (deep venous thrombosis) (HCC)     left    DVT of upper extremity (deep vein thrombosis) (HCC)     Left X 2    Factor 5 Leiden mutation, heterozygous (HCC)     Heterozygous for BERT-1 4G allele

## 2024-10-08 NOTE — TELEPHONE ENCOUNTER
Per the order of Dr. Roldan, patient has been scheduled for Laparoscopic robotic incisional hernia repair  on 2024.  patient provided with procedure information during office visit and scheduled for post op follow up appointment.  Patient instructed to please contact our office with any questions.    Procedure scheduled through [facility].  Dr. Roldan to enter orders.        Prior Authorization Form:      DEMOGRAPHICS:                     Patient Name:  Gertrude Clayton  Patient :  1990            Insurance:  Payor: [facility] MEDICARE / Plan: [employer] AARP MEDICARE ADVANTAGE / Product Type: *No Product type* /   Insurance ID Number:    Payer/Plan Subscr  Sex Relation Sub. Ins. ID Effective Group Num   1. UHC MEDICARE * GERTRUDE CLAYTON 1990 Female Self [id number] 10/1/23 [id number]                                    [address] [id number]         DIAGNOSIS & PROCEDURE:                       Procedure/Operation: laparoscopic robotic incisional hernia repair            CPT Code: 95277    Diagnosis:  Incisional hernia    ICD10 Code: K43.2    Location:  [facility]    Surgeon:  Leonel    SCHEDULING INFORMATION:                          Date: 2024    Time: TBD              Anesthesia:  GENERAL                                                       Status:  Outpatient        Special Comments:         Electronically signed by Pilar Dang on 10/8/2024 at 10:57 AM

## 2024-10-21 RX ORDER — SODIUM CHLORIDE, SODIUM LACTATE, POTASSIUM CHLORIDE, CALCIUM CHLORIDE 600; 310; 30; 20 MG/100ML; MG/100ML; MG/100ML; MG/100ML
INJECTION, SOLUTION INTRAVENOUS CONTINUOUS
OUTPATIENT
Start: 2024-11-01

## 2024-10-22 ENCOUNTER — HOSPITAL ENCOUNTER (OUTPATIENT)
Dept: PREADMISSION TESTING | Age: 34
Discharge: HOME | End: 2024-10-22
Payer: MEDICARE

## 2024-10-22 DIAGNOSIS — K43.2 INCISIONAL HERNIA, WITHOUT OBSTRUCTION OR GANGRENE: Primary | ICD-10-CM

## 2024-10-22 LAB
ANION GAP SERPL CALCULATED.3IONS-SCNC: 9 MMOL/L (ref 7–16)
BUN SERPL-MCNC: 12 MG/DL (ref 6–20)
CALCIUM SERPL-MCNC: 9.8 MG/DL (ref 8.6–10.2)
CHLORIDE SERPL-SCNC: 104 MMOL/L (ref 98–107)
CO2 SERPL-SCNC: 26 MMOL/L (ref 22–29)
CREAT SERPL-MCNC: 0.8 MG/DL (ref 0.5–1)
EKG ATRIAL RATE: 67 BPM
EKG P AXIS: 18 DEGREES
EKG P-R INTERVAL: 170 MS
EKG Q-T INTERVAL: 368 MS
EKG QRS DURATION: 66 MS
EKG QTC CALCULATION (BAZETT): 388 MS
EKG R AXIS: 64 DEGREES
EKG T AXIS: 46 DEGREES
EKG VENTRICULAR RATE: 67 BPM
ERYTHROCYTE [DISTWIDTH] IN BLOOD BY AUTOMATED COUNT: 11.9 % (ref 11.5–15)
GFR, ESTIMATED: >90 ML/MIN/1.73M2
GLUCOSE SERPL-MCNC: 92 MG/DL (ref 74–99)
HCT VFR BLD AUTO: 39.6 % (ref 34–48)
HGB BLD-MCNC: 13.9 G/DL (ref 11.5–15.5)
MCH RBC QN AUTO: 31.4 PG (ref 26–35)
MCHC RBC AUTO-ENTMCNC: 35.1 G/DL (ref 32–34.5)
MCV RBC AUTO: 89.6 FL (ref 80–99.9)
PLATELET # BLD AUTO: 329 K/UL (ref 130–450)
PMV BLD AUTO: 9.4 FL (ref 7–12)
POTASSIUM SERPL-SCNC: 3.7 MMOL/L (ref 3.5–5)
RBC # BLD AUTO: 4.42 M/UL (ref 3.5–5.5)
SODIUM SERPL-SCNC: 139 MMOL/L (ref 132–146)
WBC OTHER # BLD: 7.3 K/UL (ref 4.5–11.5)

## 2024-10-22 PROCEDURE — 80048 BASIC METABOLIC PNL TOTAL CA: CPT

## 2024-10-22 PROCEDURE — 85027 COMPLETE CBC AUTOMATED: CPT

## 2024-10-22 PROCEDURE — 93005 ELECTROCARDIOGRAM TRACING: CPT | Performed by: ANESTHESIOLOGY

## 2024-10-22 PROCEDURE — 93010 ELECTROCARDIOGRAM REPORT: CPT | Performed by: INTERNAL MEDICINE

## 2024-10-22 RX ORDER — CLONAZEPAM 1 MG/1
1 TABLET ORAL 2 TIMES DAILY PRN
COMMUNITY

## 2024-10-22 NOTE — PROGRESS NOTES
[facility]                                                                                                                    PRE OP INSTRUCTIONS FOR  Gertrude Morales        Date: 10/22/2024    Date of surgery: 11/1/24   Arrival Time: Hospital will call you between 5pm and 7pm with your final arrival time for surgery. Go to     front of hospital and check in at information desk.    Nothing by mouth (NPO) as instructed. May have clear liquids up to 2 hours prior to surgery. Nothing solid after midnight. Examples: water, apple juice, black coffee, plain tea    Take the following medications with a small sip of water on the morning of Surgery:  celexa, pantoprazole, use inhaler and bring, migraine or klonopin  med if needed.     Diabetics may take half the evening dose of insulin but none after midnight.  If you feel symptomatic or have low blood sugar morning of surgery drink 1-2 ounces of apple juice only. If you take a weekly insulin injection ___mounjaro____________, stop 7 days prior to surgery. If you take _______________, stop 3-4 days prior to surgery.    Aspirin, Ibuprofen, Advil, Naproxen, other Anti-inflammatory products should be stopped before surgery as directed by your surgeon, cardiologist, or primary care Doctor. Herbal supplements and Vitamin E should be stopped five days prior.  May take Tylenol unless instructed otherwise by your surgeon.    Check with your Doctor regarding stopping Plavix, Coumadin, Lovenox, Eliquis, Effient, or other blood thinners such as, pradaxa, lixiana, xaralto and savaysa.    Do not smoke, vape, or use illicit drugs and do not drink any alcoholic beverages 24 hours prior to surgery.    You may brush your teeth the morning of surgery.      You MUST make arrangements for a responsible adult, 18 and over, to take you home after your surgery. You will not be allowed to leave alone or drive yourself home.  You will need someone stay with you the first

## 2024-10-23 ENCOUNTER — TELEPHONE (OUTPATIENT)
Dept: FAMILY MEDICINE CLINIC | Age: 34
End: 2024-10-23

## 2024-10-23 ENCOUNTER — TELEPHONE (OUTPATIENT)
Dept: ADMINISTRATIVE | Age: 34
End: 2024-10-23

## 2024-10-23 DIAGNOSIS — R94.31 ABNORMAL EKG: Primary | ICD-10-CM

## 2024-10-23 PROBLEM — K40.20 BILATERAL INGUINAL HERNIA: Status: RESOLVED | Noted: 2022-10-12 | Resolved: 2024-10-23

## 2024-10-23 PROBLEM — K40.90 NON-RECURRENT UNILATERAL INGUINAL HERNIA WITHOUT OBSTRUCTION OR GANGRENE: Status: RESOLVED | Noted: 2022-06-14 | Resolved: 2024-10-23

## 2024-10-23 PROBLEM — F41.9 ANXIETY: Status: RESOLVED | Noted: 2017-06-25 | Resolved: 2024-10-23

## 2024-10-23 PROBLEM — R20.2 NUMBNESS AND TINGLING IN BOTH HANDS: Status: RESOLVED | Noted: 2024-01-03 | Resolved: 2024-10-23

## 2024-10-23 PROBLEM — K43.2 INCISIONAL HERNIA: Status: RESOLVED | Noted: 2024-10-08 | Resolved: 2024-10-23

## 2024-10-23 PROBLEM — R20.0 NUMBNESS AND TINGLING IN BOTH HANDS: Status: RESOLVED | Noted: 2024-01-03 | Resolved: 2024-10-23

## 2024-10-23 NOTE — PROGRESS NOTES
EKG from 10/22/24 reviewed with Dr. Michelle- he would like cardiac risk stratification. Update to pt, Pilar at Dr. Roldan's office and Keshia at Dr. Ramos's office.

## 2024-10-23 NOTE — TELEPHONE ENCOUNTER
Patient informed orders placed. Patient will call back to let us know when she has appt with cardiology

## 2024-10-23 NOTE — TELEPHONE ENCOUNTER
Patient Appointment Form: scheduled from referral.      PCP: Moody Ramos  Referring: Moody Ramos     Has the Patient:       Seen a Cardiologist? Yes or No       date: 10/10/2016 Physician: DR. Gifford location: [facility]     Had a heart catheterization? Yes or No    date:        Hospital:     Had heart surgery? Yes or No     Had a stress test or nuclear stress test? Yes or No     date: 2015   facility name: [facility]     Had an echocardiogram? Yes or No     date: 12/1/2015     facility name: [facility]    Had a vascular ultrasound? Yes or No     Had a 24/48 heart monitor or extended cardiac event monitor? Yes or No:   24 Hour, 48 Hour, extended cardiac event/MCOT monitor, other      date: 2015       Who ordered: Dr. Gifford     Had recent blood work in the last 6 months? Yes or No      date: 9/10/2024    ordering physician: Dr. Moody Ramos     Had a pacemaker/ICD/ILR implant? Yes Or No:   ICD, ILR, pacemaker      device company: Unknown, [employer], [place], [employer], [employer], [employer]     Seen an Electrophysiologist? Yes or No    date: 5/18/2021   physician: Dr. Cohen   location: [facility]    Had a cardiac ablation?  Yes: Date, Dr ___, Location or No           Will send records via: EPIC        Date & time of appointment:  10/24 at 10:30a with Dr. Justin

## 2024-10-23 NOTE — TELEPHONE ENCOUNTER
Patient had PAT and had abnormal EKG. The anesthesiologist is requesting that she have cardiac clearance. Are you comfortable providing cardiac clearance or does she need sent to cardiology for that?     PAT left her a message to call us to schedule her pre op. EKG and lab results are available in Epic

## 2024-10-24 ENCOUNTER — OFFICE VISIT (OUTPATIENT)
Dept: CARDIOLOGY CLINIC | Age: 34
End: 2024-10-24
Payer: MEDICARE

## 2024-10-24 VITALS
HEIGHT: 66 IN | HEART RATE: 61 BPM | WEIGHT: 234.2 LBS | BODY MASS INDEX: 37.64 KG/M2 | DIASTOLIC BLOOD PRESSURE: 74 MMHG | SYSTOLIC BLOOD PRESSURE: 132 MMHG | RESPIRATION RATE: 18 BRPM

## 2024-10-24 DIAGNOSIS — K42.0 UMBILICAL HERNIA WITH OBSTRUCTION, WITHOUT GANGRENE: ICD-10-CM

## 2024-10-24 DIAGNOSIS — R94.31 ABNORMAL EKG: Primary | ICD-10-CM

## 2024-10-24 DIAGNOSIS — Z01.810 PREOP CARDIOVASCULAR EXAM: ICD-10-CM

## 2024-10-24 PROCEDURE — 3078F DIAST BP <80 MM HG: CPT | Performed by: INTERNAL MEDICINE

## 2024-10-24 PROCEDURE — 93000 ELECTROCARDIOGRAM COMPLETE: CPT | Performed by: INTERNAL MEDICINE

## 2024-10-24 PROCEDURE — 3075F SYST BP GE 130 - 139MM HG: CPT | Performed by: INTERNAL MEDICINE

## 2024-10-24 PROCEDURE — 99204 OFFICE O/P NEW MOD 45 MIN: CPT | Performed by: INTERNAL MEDICINE

## 2024-10-24 NOTE — TELEPHONE ENCOUNTER
Patient cleared from cardiology, see Nhan Crawford note. Please advise if patient needs appt for preopp 11/18/24. Will have to r/s her surgery

## 2024-10-24 NOTE — PROGRESS NOTES
Chief Complaint   Patient presents with    abnormal ekg       Patient Active Problem List    Diagnosis Date Noted    Umbilical hernia with obstruction, without gangrene 06/14/2022     Priority: Medium    Primary hypertension 09/27/2024    H/O: CVA (cerebrovascular accident) 11/29/2023    Type 2 diabetes mellitus with vascular disease (HCC) 03/16/2022    Chronic migraine with aura 02/07/2021    Morbid obesity 05/22/2020    Lumbar radiculopathy     Hashimoto's thyroiditis 09/29/2017    Mild intermittent asthma without complication 02/08/2017    Factor V deficiency (HCC) 10/04/2016    Hyperlipidemia 02/08/2016    Syncope 01/05/2016     Overview Note:     Tilt table [facility] 2016 abnormal provoking immediate orthostatic tachycardia, systolic orthostasis and possible vasovagal reaction. The tilt was aborted after only four minutes. Dr. Washington added Mestinon.   Full autonomic and plasma volume testing unremarkable          Current Outpatient Medications   Medication Sig Dispense Refill    clonazePAM (KLONOPIN) 1 MG tablet Take 1 tablet by mouth 2 times daily as needed.      Tirzepatide (MOUNJARO) 12.5 MG/0.5ML SOPN SC injection Inject 0.5 mLs into the skin once a week (Patient taking differently: Inject 0.5 mLs into the skin once a week thursday) 6 mL 1    apixaban (ELIQUIS) 5 MG TABS tablet Take 1 tablet by mouth 2 times daily 180 tablet 1    folic acid (FOLVITE) 1 MG tablet One a day 90 tablet 1    metoprolol succinate (TOPROL XL) 25 MG extended release tablet Take 0.5 tablets by mouth nightly 90 tablet 1    pantoprazole (PROTONIX) 40 MG tablet Take 1 tablet by mouth daily (Patient taking differently: Take 1 tablet by mouth as needed) 90 tablet 1    Vitamin D, Cholecalciferol, 50 MCG (2000 UT) CAPS Take 1 capsule by mouth daily 90 capsule 1    citalopram (CELEXA) 20 MG tablet Take 1.5 tablets by mouth daily Takes 2 tabs.   40 mg total 90 tablet 1    Rimegepant Sulfate 75 MG TBDP Take 75 mg by mouth daily as needed (migraine)

## 2024-10-31 PROBLEM — K43.2 INCISIONAL HERNIA: Status: ACTIVE | Noted: 2024-10-08

## 2024-10-31 NOTE — OP NOTE
Operative Note      Patient: Gertrude Morales  YOB: 1990  MRN: [id number]    Date of Procedure: 11/1/2024    Pre-Op Diagnosis Codes:      * Incisional hernia [K43.2]    Post-Op Diagnosis: Same       Procedure(s):  HERNIA INCISIONAL REPAIR LAPAROSCOPIC ROBOTIC XI    Surgeon(s):  Aure Roldan MD    Assistant:   * No surgical staff found *    Anesthesia: General    Estimated Blood Loss (mL): less than 50     Complications: None    Specimens:   * No specimens in log *    Implants:  * No implants in log *      Drains: * No LDAs found *    Findings:  Infection Present At Time Of Surgery (PATOS) (choose all levels that have infection present):  No infection present  Other Findings: see below    Detailed Description of Procedure:     The patient was identified and the procedure was confirmed.  She was taken to the operating room and laid supine on the operating room table.  She underwent general anesthesia by the anesthesia team and was intubated.  Her abdomen was then prepped and draped in sterile fashion.  She was given IV antibiotics prior to skin incision.    A 8 mm incision was made at Stone's point in the left upper quadrant after the skin was anesthetized.  A Veress needle was passed into the abdominal cavity which was insufflated to 15 mmHg.  An 8 mm robotic trocar was then placed.  A camera was inserted.  2 additional 8 mm trocars were placed in the left lateral abdomen and left lower quadrant.  The robot was then docked on the patient's left side.    Using a ProGrasp and scissors, I proceeded to dissect out the hernia in the superior aspect of the umbilicus.  Omental adhesions were then first taken down to free up the anterior abdominal wall.  The supraumbilical incarcerated hernia was reduced in this fashion.  I then proceeded to perform a preperitoneal dissection by incising the peritoneum over the left posterior rectus sheath.  Once this was done, scissors were used to carry out the

## 2024-10-31 NOTE — H&P
General Surgery History and Physical  [facility]    Patient's Name/Date of Birth: Gertrude Morales / 1990    Date: 2024     Surgeon: Aure Roldan MD    PCP: Moody Ramos DO     Chief Complaint: Abdominal hernia    HPI:   Gertrude Morales is a 33 y.o. female who presents for evaluation of umbilical hernia with pain. Timing is constant, radiation to mid abdomen, alleviated by rest and started several weeks ago and severity is 8/10. she denies history of multiple abdominal surgeries. she had prior open supra umbilical hernia repair performed at the time of robotic bilateral inguinal hernia repair.  This was done 2 years ago.. Denies nausea, vomiting, fever, chills, SOB, chest pain, diarrheal constipation.    Patient Active Problem List   Diagnosis    Syncope    Hyperlipidemia    Factor V deficiency (HCC)    Mild intermittent asthma without complication    Hashimoto's thyroiditis    Lumbar radiculopathy    Morbid obesity    Chronic migraine with aura    Type 2 diabetes mellitus with vascular disease (HCC)    Umbilical hernia with obstruction, without gangrene    H/O: CVA (cerebrovascular accident)    Primary hypertension    Incisional hernia       Past Medical History:   Diagnosis Date    Anemia     Anxiety     Asthma     Blood clot embolism during pregnancy, antepartum     Cerebrovascular accident (CVA) due to vascular occlusion (HCC)     at age 24- left sided weakness     delivery delivered 2021    Chronic migraine     Coagulopathy (HCC)     Diabetes mellitus (HCC)     gestational - no current issues as of 10-17-22    DVT of leg (deep venous thrombosis) (HCC)     left    DVT of upper extremity (deep vein thrombosis) (HCC)     Left X 2    Factor 5 Leiden mutation, heterozygous (HCC)     Heterozygous for BERT-1 4G allele     Hyperlipidemia     Low back pain     MTHFR mutation     Oligohydramnios in third trimester 2021    POTS (postural orthostatic

## 2024-10-31 NOTE — DISCHARGE INSTRUCTIONS
Patient Discharge Instructions Hernia Repair  [facility]  Aure Roldan MD  [phone] (o)  [phone] (f)  Discharge Date:  11/1/2024    Discharged To:  Home    RESUME ACTIVITY:     WOUND CARE: The day of surgery be sure to place ice to site of operation for 15min intervals. No need to sleep with ice in place. Keep protective cloth barrier between ice bag and skin to prevent frostbite.     Bruising is normal after surgery. Ice will help reduce swelling and bruising. A bulge at the hernia site is normal after surgery and may persist for a few months. This is the normal healing process.    BATHING:  May shower 24hrs after surgery, remove dressings after 24hrs if in place, leave steristrips in place as they will fall of independently. You may have adhesive glue covering your incisions which will dissolve on it own.  May bathe or swim 5 days after surgery    DRIVING: No driving while on pain medications    RETURN TO WORK: after follow up appointment    WALKING:  Yes    SEXUAL ACTIVITY: Yes    STAIRS:  Yes    LIFTING: Less than 15 pounds for 4 weeks    DIET: General adult    SPECIAL INSTRUCTIONS:     Call physician if they or any other problems occur:  Fever over 101°    Redness, swelling, hardness or warmth at the operative site  Unrelieved nausea    Foul smelling or cloudy drainage at the operative site   Unrelieved pain    Blood soaked dressing. (Some oozing may be normal)    Call office for follow up appointment with Dr Roldan in 2 weeks.    Call the office at [phone] if you have a fever > 100 F, or if your incision becomes red, tender, or drains more than a small amount of clear fluid.    BOWELS: constipation is a side effect of your pain meds, take a daily laxative (miralax, dulcolax, etc.) as needed to keep your bowels moving as they normally do, do not go 2-3 days without having a bowel movement.     Pain medications;   Percocet- take at least 1/2 pill every 6 hours the first 36  might get caught in traffic.  Most people are able to return to work within 1 to 2 weeks after surgery.  You may shower 24 to 48 hours after surgery, if your doctor okays it. Pat the cut (incision) dry. Do not take a bath for the first 2 weeks, or until your doctor tells you it is okay.  Your doctor will tell you when you can have sex again.    Diet  You can eat your normal diet. If your stomach is upset, try bland, low-fat foods like plain rice, broiled chicken, toast, and yogurt.  Drink plenty of fluids (unless your doctor tells you not to).  You may notice that your bowel movements are not regular right after your surgery. This is common. Avoid constipation and straining with bowel movements. You may want to take a fiber supplement every day. If you have not had a bowel movement after a couple of days, ask your doctor about taking a mild laxative.    Medicines  Be safe with medicines. Take pain medicines exactly as directed.  If the doctor gave you a prescription medicine for pain, take it as prescribed.  If you are not taking a prescription pain medicine, take an over-the-counter medicine such as acetaminophen (Tylenol), ibuprofen (Advil, Motrin), or naproxen (Aleve). Read and follow all instructions on the label.  Do not take two or more pain medicines at the same time unless the doctor told you to. Many pain medicines have acetaminophen, which is Tylenol. Too much acetaminophen (Tylenol) can be harmful.  If your doctor prescribed antibiotics, take them as directed. Do not stop taking them just because you feel better. You need to take the full course of antibiotics.  If you think your pain medicine is making you sick to your stomach:  Take your medicine after meals (unless your doctor has told you not to).  Ask your doctor for a different pain medicine.    Incision care  If you have strips of tape on the cut (incision) the doctor made, leave the tape on for a week or until it falls off.  If you have staples

## 2024-11-01 ENCOUNTER — ANESTHESIA (OUTPATIENT)
Dept: OPERATING ROOM | Age: 34
End: 2024-11-01
Payer: MEDICARE

## 2024-11-01 ENCOUNTER — ANESTHESIA EVENT (OUTPATIENT)
Dept: OPERATING ROOM | Age: 34
End: 2024-11-01
Payer: MEDICARE

## 2024-11-01 ENCOUNTER — HOSPITAL ENCOUNTER (OUTPATIENT)
Age: 34
Setting detail: OUTPATIENT SURGERY
Discharge: HOME | End: 2024-11-01
Attending: SURGERY | Admitting: SURGERY
Payer: MEDICARE

## 2024-11-01 VITALS
TEMPERATURE: 97.9 F | DIASTOLIC BLOOD PRESSURE: 63 MMHG | OXYGEN SATURATION: 100 % | HEIGHT: 66 IN | SYSTOLIC BLOOD PRESSURE: 120 MMHG | HEART RATE: 70 BPM | RESPIRATION RATE: 16 BRPM | BODY MASS INDEX: 37.28 KG/M2 | WEIGHT: 232 LBS

## 2024-11-01 DIAGNOSIS — K43.0 INCISIONAL HERNIA WITH OBSTRUCTION BUT NO GANGRENE: Primary | ICD-10-CM

## 2024-11-01 LAB
EKG ATRIAL RATE: 74 BPM
EKG P AXIS: -2 DEGREES
EKG P-R INTERVAL: 134 MS
EKG Q-T INTERVAL: 388 MS
EKG QRS DURATION: 74 MS
EKG QTC CALCULATION (BAZETT): 430 MS
EKG R AXIS: 70 DEGREES
EKG T AXIS: 51 DEGREES
EKG VENTRICULAR RATE: 74 BPM
HCG, URINE, POC: NEGATIVE
Lab: NORMAL
NEGATIVE QC PASS/FAIL: NORMAL
POSITIVE QC PASS/FAIL: NORMAL

## 2024-11-01 PROCEDURE — 2580000003 HC RX 258: Performed by: ANESTHESIOLOGY

## 2024-11-01 PROCEDURE — S2900 ROBOTIC SURGICAL SYSTEM: HCPCS | Performed by: SURGERY

## 2024-11-01 PROCEDURE — 7100000010 HC PHASE II RECOVERY - FIRST 15 MIN: Performed by: SURGERY

## 2024-11-01 PROCEDURE — 2580000003 HC RX 258: Performed by: SURGERY

## 2024-11-01 PROCEDURE — 3600000009 HC SURGERY ROBOT BASE: Performed by: SURGERY

## 2024-11-01 PROCEDURE — 2500000003 HC RX 250 WO HCPCS: Performed by: NURSE ANESTHETIST, CERTIFIED REGISTERED

## 2024-11-01 PROCEDURE — 7100000001 HC PACU RECOVERY - ADDTL 15 MIN: Performed by: SURGERY

## 2024-11-01 PROCEDURE — 93010 ELECTROCARDIOGRAM REPORT: CPT | Performed by: INTERNAL MEDICINE

## 2024-11-01 PROCEDURE — 6360000002 HC RX W HCPCS: Performed by: ANESTHESIOLOGY

## 2024-11-01 PROCEDURE — 93005 ELECTROCARDIOGRAM TRACING: CPT | Performed by: ANESTHESIOLOGY

## 2024-11-01 PROCEDURE — 3700000000 HC ANESTHESIA ATTENDED CARE: Performed by: SURGERY

## 2024-11-01 PROCEDURE — C1781 MESH (IMPLANTABLE): HCPCS | Performed by: SURGERY

## 2024-11-01 PROCEDURE — 6360000002 HC RX W HCPCS: Performed by: SURGERY

## 2024-11-01 PROCEDURE — 6360000002 HC RX W HCPCS: Performed by: NURSE ANESTHETIST, CERTIFIED REGISTERED

## 2024-11-01 PROCEDURE — 6370000000 HC RX 637 (ALT 250 FOR IP): Performed by: ANESTHESIOLOGY

## 2024-11-01 PROCEDURE — 6370000000 HC RX 637 (ALT 250 FOR IP): Performed by: SURGERY

## 2024-11-01 PROCEDURE — 7100000011 HC PHASE II RECOVERY - ADDTL 15 MIN: Performed by: SURGERY

## 2024-11-01 PROCEDURE — 2709999900 HC NON-CHARGEABLE SUPPLY: Performed by: SURGERY

## 2024-11-01 PROCEDURE — 2500000003 HC RX 250 WO HCPCS: Performed by: SURGERY

## 2024-11-01 PROCEDURE — 3700000001 HC ADD 15 MINUTES (ANESTHESIA): Performed by: SURGERY

## 2024-11-01 PROCEDURE — 49594 RPR AA HRN 1ST 3-10 NCR/STRN: CPT | Performed by: SURGERY

## 2024-11-01 PROCEDURE — 3600000019 HC SURGERY ROBOT ADDTL 15MIN: Performed by: SURGERY

## 2024-11-01 PROCEDURE — 2580000003 HC RX 258: Performed by: NURSE ANESTHETIST, CERTIFIED REGISTERED

## 2024-11-01 PROCEDURE — 7100000000 HC PACU RECOVERY - FIRST 15 MIN: Performed by: SURGERY

## 2024-11-01 DEVICE — LAPAROSCOPIC SELF-FIXATING MESH POLYESTER WITH POLYLACTIC ACID GRIPS AND COLLAGEN FILM
Type: IMPLANTABLE DEVICE | Site: ABDOMEN | Status: FUNCTIONAL
Brand: PROGRIP

## 2024-11-01 RX ORDER — NALOXONE HYDROCHLORIDE 0.4 MG/ML
INJECTION, SOLUTION INTRAMUSCULAR; INTRAVENOUS; SUBCUTANEOUS PRN
Status: DISCONTINUED | OUTPATIENT
Start: 2024-11-01 | End: 2024-11-01 | Stop reason: HOSPADM

## 2024-11-01 RX ORDER — HYDRALAZINE HYDROCHLORIDE 20 MG/ML
10 INJECTION INTRAMUSCULAR; INTRAVENOUS
Status: DISCONTINUED | OUTPATIENT
Start: 2024-11-01 | End: 2024-11-01 | Stop reason: HOSPADM

## 2024-11-01 RX ORDER — SODIUM CHLORIDE, SODIUM LACTATE, POTASSIUM CHLORIDE, CALCIUM CHLORIDE 600; 310; 30; 20 MG/100ML; MG/100ML; MG/100ML; MG/100ML
INJECTION, SOLUTION INTRAVENOUS
Status: DISCONTINUED | OUTPATIENT
Start: 2024-11-01 | End: 2024-11-01 | Stop reason: SDUPTHER

## 2024-11-01 RX ORDER — MIDAZOLAM HYDROCHLORIDE 1 MG/ML
2 INJECTION, SOLUTION INTRAMUSCULAR; INTRAVENOUS
Status: DISCONTINUED | OUTPATIENT
Start: 2024-11-01 | End: 2024-11-01 | Stop reason: HOSPADM

## 2024-11-01 RX ORDER — ROCURONIUM BROMIDE 10 MG/ML
INJECTION, SOLUTION INTRAVENOUS
Status: DISCONTINUED | OUTPATIENT
Start: 2024-11-01 | End: 2024-11-01 | Stop reason: SDUPTHER

## 2024-11-01 RX ORDER — OXYCODONE AND ACETAMINOPHEN 5; 325 MG/1; MG/1
1 TABLET ORAL EVERY 6 HOURS PRN
Qty: 12 TABLET | Refills: 0 | Status: SHIPPED | OUTPATIENT
Start: 2024-11-01 | End: 2024-11-04

## 2024-11-01 RX ORDER — DEXAMETHASONE SODIUM PHOSPHATE 10 MG/ML
INJECTION, SOLUTION INTRAMUSCULAR; INTRAVENOUS
Status: DISCONTINUED | OUTPATIENT
Start: 2024-11-01 | End: 2024-11-01 | Stop reason: SDUPTHER

## 2024-11-01 RX ORDER — LABETALOL HYDROCHLORIDE 5 MG/ML
10 INJECTION, SOLUTION INTRAVENOUS
Status: DISCONTINUED | OUTPATIENT
Start: 2024-11-01 | End: 2024-11-01 | Stop reason: HOSPADM

## 2024-11-01 RX ORDER — OXYCODONE AND ACETAMINOPHEN 5; 325 MG/1; MG/1
1 TABLET ORAL ONCE
Status: COMPLETED | OUTPATIENT
Start: 2024-11-01 | End: 2024-11-01

## 2024-11-01 RX ORDER — BUPIVACAINE HYDROCHLORIDE AND EPINEPHRINE 2.5; 5 MG/ML; UG/ML
INJECTION, SOLUTION EPIDURAL; INFILTRATION; INTRACAUDAL; PERINEURAL PRN
Status: DISCONTINUED | OUTPATIENT
Start: 2024-11-01 | End: 2024-11-01 | Stop reason: ALTCHOICE

## 2024-11-01 RX ORDER — MEPERIDINE HYDROCHLORIDE 25 MG/ML
12.5 INJECTION INTRAMUSCULAR; INTRAVENOUS; SUBCUTANEOUS EVERY 5 MIN PRN
Status: DISCONTINUED | OUTPATIENT
Start: 2024-11-01 | End: 2024-11-01 | Stop reason: HOSPADM

## 2024-11-01 RX ORDER — KETOROLAC TROMETHAMINE 30 MG/ML
30 INJECTION, SOLUTION INTRAMUSCULAR; INTRAVENOUS ONCE
Status: COMPLETED | OUTPATIENT
Start: 2024-11-01 | End: 2024-11-01

## 2024-11-01 RX ORDER — PROPOFOL 10 MG/ML
INJECTION, EMULSION INTRAVENOUS
Status: DISCONTINUED | OUTPATIENT
Start: 2024-11-01 | End: 2024-11-01 | Stop reason: SDUPTHER

## 2024-11-01 RX ORDER — SODIUM CHLORIDE, SODIUM LACTATE, POTASSIUM CHLORIDE, CALCIUM CHLORIDE 600; 310; 30; 20 MG/100ML; MG/100ML; MG/100ML; MG/100ML
INJECTION, SOLUTION INTRAVENOUS CONTINUOUS
Status: DISCONTINUED | OUTPATIENT
Start: 2024-11-01 | End: 2024-11-01 | Stop reason: HOSPADM

## 2024-11-01 RX ORDER — SODIUM CHLORIDE 0.9 % (FLUSH) 0.9 %
5-40 SYRINGE (ML) INJECTION PRN
Status: DISCONTINUED | OUTPATIENT
Start: 2024-11-01 | End: 2024-11-01 | Stop reason: HOSPADM

## 2024-11-01 RX ORDER — EPHEDRINE SULFATE/0.9% NACL/PF 25 MG/5 ML
SYRINGE (ML) INTRAVENOUS
Status: DISCONTINUED | OUTPATIENT
Start: 2024-11-01 | End: 2024-11-01 | Stop reason: SDUPTHER

## 2024-11-01 RX ORDER — SODIUM CHLORIDE 0.9 % (FLUSH) 0.9 %
5-40 SYRINGE (ML) INJECTION EVERY 12 HOURS SCHEDULED
Status: DISCONTINUED | OUTPATIENT
Start: 2024-11-01 | End: 2024-11-01 | Stop reason: HOSPADM

## 2024-11-01 RX ORDER — METHOCARBAMOL 100 MG/ML
1000 INJECTION, SOLUTION INTRAMUSCULAR; INTRAVENOUS ONCE
Status: COMPLETED | OUTPATIENT
Start: 2024-11-01 | End: 2024-11-01

## 2024-11-01 RX ORDER — IPRATROPIUM BROMIDE AND ALBUTEROL SULFATE 2.5; .5 MG/3ML; MG/3ML
1 SOLUTION RESPIRATORY (INHALATION)
Status: COMPLETED | OUTPATIENT
Start: 2024-11-01 | End: 2024-11-01

## 2024-11-01 RX ORDER — METHOCARBAMOL 750 MG/1
750 TABLET, FILM COATED ORAL 4 TIMES DAILY
Qty: 40 TABLET | Refills: 0 | Status: SHIPPED | OUTPATIENT
Start: 2024-11-01 | End: 2024-11-11

## 2024-11-01 RX ORDER — PROCHLORPERAZINE EDISYLATE 5 MG/ML
5 INJECTION INTRAMUSCULAR; INTRAVENOUS
Status: DISCONTINUED | OUTPATIENT
Start: 2024-11-01 | End: 2024-11-01 | Stop reason: HOSPADM

## 2024-11-01 RX ORDER — FENTANYL CITRATE 0.05 MG/ML
25 INJECTION, SOLUTION INTRAMUSCULAR; INTRAVENOUS EVERY 5 MIN PRN
Status: DISCONTINUED | OUTPATIENT
Start: 2024-11-01 | End: 2024-11-01 | Stop reason: HOSPADM

## 2024-11-01 RX ORDER — SODIUM CHLORIDE 9 MG/ML
INJECTION, SOLUTION INTRAVENOUS PRN
Status: DISCONTINUED | OUTPATIENT
Start: 2024-11-01 | End: 2024-11-01 | Stop reason: HOSPADM

## 2024-11-01 RX ORDER — FENTANYL CITRATE 50 UG/ML
INJECTION, SOLUTION INTRAMUSCULAR; INTRAVENOUS
Status: DISCONTINUED | OUTPATIENT
Start: 2024-11-01 | End: 2024-11-01 | Stop reason: SDUPTHER

## 2024-11-01 RX ORDER — ONDANSETRON 2 MG/ML
INJECTION INTRAMUSCULAR; INTRAVENOUS
Status: DISCONTINUED | OUTPATIENT
Start: 2024-11-01 | End: 2024-11-01 | Stop reason: SDUPTHER

## 2024-11-01 RX ORDER — DROPERIDOL 2.5 MG/ML
0.62 INJECTION, SOLUTION INTRAMUSCULAR; INTRAVENOUS
Status: DISCONTINUED | OUTPATIENT
Start: 2024-11-01 | End: 2024-11-01 | Stop reason: HOSPADM

## 2024-11-01 RX ORDER — LIDOCAINE HYDROCHLORIDE 20 MG/ML
INJECTION, SOLUTION INTRAVENOUS
Status: DISCONTINUED | OUTPATIENT
Start: 2024-11-01 | End: 2024-11-01 | Stop reason: SDUPTHER

## 2024-11-01 RX ORDER — DIPHENHYDRAMINE HYDROCHLORIDE 50 MG/ML
12.5 INJECTION INTRAMUSCULAR; INTRAVENOUS
Status: DISCONTINUED | OUTPATIENT
Start: 2024-11-01 | End: 2024-11-01 | Stop reason: HOSPADM

## 2024-11-01 RX ORDER — MIDAZOLAM HYDROCHLORIDE 1 MG/ML
INJECTION, SOLUTION INTRAMUSCULAR; INTRAVENOUS
Status: DISCONTINUED | OUTPATIENT
Start: 2024-11-01 | End: 2024-11-01 | Stop reason: SDUPTHER

## 2024-11-01 RX ADMIN — MIDAZOLAM 2 MG: 1 INJECTION INTRAMUSCULAR; INTRAVENOUS at 08:07

## 2024-11-01 RX ADMIN — EPHEDRINE SULFATE 10 MG: 5 INJECTION INTRAVENOUS at 08:30

## 2024-11-01 RX ADMIN — IPRATROPIUM BROMIDE AND ALBUTEROL SULFATE 1 DOSE: .5; 2.5 SOLUTION RESPIRATORY (INHALATION) at 10:38

## 2024-11-01 RX ADMIN — EPHEDRINE SULFATE 15 MG: 5 INJECTION INTRAVENOUS at 08:34

## 2024-11-01 RX ADMIN — HYDROMORPHONE HYDROCHLORIDE 0.5 MG: 1 INJECTION, SOLUTION INTRAMUSCULAR; INTRAVENOUS; SUBCUTANEOUS at 09:23

## 2024-11-01 RX ADMIN — FENTANYL CITRATE 50 MCG: 50 INJECTION, SOLUTION INTRAMUSCULAR; INTRAVENOUS at 08:48

## 2024-11-01 RX ADMIN — ONDANSETRON 4 MG: 2 INJECTION, SOLUTION INTRAMUSCULAR; INTRAVENOUS at 08:48

## 2024-11-01 RX ADMIN — CEFAZOLIN 2000 MG: 2 INJECTION, POWDER, FOR SOLUTION INTRAMUSCULAR; INTRAVENOUS at 08:07

## 2024-11-01 RX ADMIN — SODIUM CHLORIDE, POTASSIUM CHLORIDE, SODIUM LACTATE AND CALCIUM CHLORIDE: 600; 310; 30; 20 INJECTION, SOLUTION INTRAVENOUS at 06:38

## 2024-11-01 RX ADMIN — FENTANYL CITRATE 100 MCG: 50 INJECTION, SOLUTION INTRAMUSCULAR; INTRAVENOUS at 08:12

## 2024-11-01 RX ADMIN — DEXAMETHASONE SODIUM PHOSPHATE 10 MG: 10 INJECTION, SOLUTION INTRAMUSCULAR; INTRAVENOUS at 08:18

## 2024-11-01 RX ADMIN — SODIUM CHLORIDE, POTASSIUM CHLORIDE, SODIUM LACTATE AND CALCIUM CHLORIDE: 600; 310; 30; 20 INJECTION, SOLUTION INTRAVENOUS at 08:07

## 2024-11-01 RX ADMIN — PROPOFOL 180 MG: 10 INJECTION, EMULSION INTRAVENOUS at 08:12

## 2024-11-01 RX ADMIN — LIDOCAINE HYDROCHLORIDE 80 MG: 20 INJECTION, SOLUTION INTRAVENOUS at 08:12

## 2024-11-01 RX ADMIN — HYDROMORPHONE HYDROCHLORIDE 0.5 MG: 1 INJECTION, SOLUTION INTRAMUSCULAR; INTRAVENOUS; SUBCUTANEOUS at 09:47

## 2024-11-01 RX ADMIN — METHOCARBAMOL 1000 MG: 100 INJECTION INTRAMUSCULAR; INTRAVENOUS at 09:26

## 2024-11-01 RX ADMIN — KETOROLAC TROMETHAMINE 30 MG: 30 INJECTION, SOLUTION INTRAMUSCULAR at 10:10

## 2024-11-01 RX ADMIN — ROCURONIUM BROMIDE 40 MG: 10 INJECTION, SOLUTION INTRAVENOUS at 08:12

## 2024-11-01 RX ADMIN — OXYCODONE HYDROCHLORIDE AND ACETAMINOPHEN 1 TABLET: 5; 325 TABLET ORAL at 11:46

## 2024-11-01 RX ADMIN — SUGAMMADEX 500 MG: 100 INJECTION, SOLUTION INTRAVENOUS at 08:46

## 2024-11-01 ASSESSMENT — PAIN DESCRIPTION - LOCATION
LOCATION: ABDOMEN

## 2024-11-01 ASSESSMENT — PAIN - FUNCTIONAL ASSESSMENT
PAIN_FUNCTIONAL_ASSESSMENT: 0-10

## 2024-11-01 ASSESSMENT — PAIN SCALES - GENERAL
PAINLEVEL_OUTOF10: 5
PAINLEVEL_OUTOF10: 5
PAINLEVEL_OUTOF10: 10
PAINLEVEL_OUTOF10: 8
PAINLEVEL_OUTOF10: 4
PAINLEVEL_OUTOF10: 7

## 2024-11-01 ASSESSMENT — PAIN DESCRIPTION - DESCRIPTORS
DESCRIPTORS: STABBING
DESCRIPTORS: SHARP;SORE
DESCRIPTORS: STABBING;SHARP

## 2024-11-01 ASSESSMENT — LIFESTYLE VARIABLES: SMOKING_STATUS: 0

## 2024-11-01 NOTE — ANESTHESIA POSTPROCEDURE EVALUATION
Department of Anesthesiology  Postprocedure Note    Patient: Gertrude Morales  MRN: [id number]  YOB: 1990  Date of evaluation: 11/1/2024    Procedure Summary       Date: 11/01/24 Room / Location: [address]    Anesthesia Start: 0804 Anesthesia Stop: 0855    Procedure: HERNIA INCISIONAL REPAIR LAPAROSCOPIC ROBOTIC XI (Abdomen) Diagnosis:       Incisional hernia      (Incisional hernia [K43.2])    Surgeons: Aure Roldan MD Responsible Provider: Nilesh Fay MD    Anesthesia Type: general ASA Status: 3            Anesthesia Type: No value filed.    Veronica Phase I: Veronica Score: 10    Veronica Phase II: Veronica Score: 10    Anesthesia Post Evaluation    Patient location during evaluation: PACU  Patient participation: complete - patient participated  Level of consciousness: awake  Airway patency: patent  Nausea & Vomiting: no nausea and no vomiting  Cardiovascular status: hemodynamically stable  Respiratory status: acceptable  Hydration status: euvolemic  Pain management: adequate        No notable events documented.

## 2024-11-01 NOTE — PROGRESS NOTES
Pt dangled on side of bed. Became nauseated , then resolved. Assisted back to bed, was weak and faint , but quickly resolved with repositioning. Resting quietly., comfortable.Call light in reach.  Mago Marinelli RN  11/1/2024  [id number]

## 2024-11-01 NOTE — PROGRESS NOTES
1025 - Patient voicing complaints that chest feels heavy and like a brick is on her chest. Dr Fay notified and order received for EKG x1.   1030 - EKG obtained and results showed normal sinus rhythm, normal EKG. Dr. Fay notified, no further orders received.   1043 - Patient states she feels much better at this time.

## 2024-11-01 NOTE — ANESTHESIA PRE PROCEDURE
05/28/2024 11:03 AM       POC Tests: No results for input(s): \"POCGLU\", \"POCNA\", \"POCK\", \"POCCL\", \"POCBUN\", \"POCHEMO\", \"POCHCT\" in the last 72 hours.    Coags:   Lab Results   Component Value Date/Time    PROTIME 11.5 06/25/2017 09:40 AM    INR 1.1 06/25/2017 09:40 AM    APTT 30.3 06/25/2017 09:40 AM       HCG (If Applicable):   Lab Results   Component Value Date    PREGTESTUR negative 06/26/2018    PREGSERUM NEGATIVE 05/26/2023        ABGs: No results found for: \"PHART\", \"PO2ART\", \"IBE4EUC\", \"KNI5UIH\", \"BEART\", \"M7IAATHR\"     Type & Screen (If Applicable):  No results found for: \"LABABO\"    Drug/Infectious Status (If Applicable):  No results found for: \"HIV\", \"HEPCAB\"    COVID-19 Screening (If Applicable):   Lab Results   Component Value Date/Time    COVID19 Positive 01/27/2022 01:23 PM    COVID19 Not Detected 12/23/2021 01:30 PM           Anesthesia Evaluation  Patient summary reviewed   no history of anesthetic complications:   Airway: Mallampati: II  TM distance: >3 FB   Neck ROM: full  Mouth opening: > = 3 FB   Dental: normal exam         Pulmonary: breath sounds clear to auscultation  (+)           asthma: allergic asthma,     (-) wheezes, not a current smoker and no decreased breath sounds                           Cardiovascular:Negative CV ROS    (+) hypertension:        Rhythm: regular  Rate: normal           Beta Blocker:  Dose within 24 Hrs      ROS comment: POTS     Neuro/Psych:   (+) CVA: residual symptoms, neuromuscular disease:, headaches: migraine headaches             ROS comment:    GI/Hepatic/Renal:        (-) no morbid obesity       Endo/Other:    (+) DiabetesType II DM, using insulin, hypothyroidism, blood dyscrasia: anticoagulation therapy and Factor V:..    (-) no electrolyte abnormalities                ROS comment: HASHIMOTO'S  Abdominal:   (+) obese          Vascular:   + DVT.       Other Findings:             Anesthesia Plan      general     ASA 3       Induction: intravenous.    MIPS:

## 2024-11-04 RX ORDER — TIRZEPATIDE 12.5 MG/.5ML
0.5 INJECTION, SOLUTION SUBCUTANEOUS WEEKLY
Qty: 6 ML | Refills: 1 | Status: SHIPPED | OUTPATIENT
Start: 2024-11-04

## 2024-11-04 NOTE — TELEPHONE ENCOUNTER
Patient called and states that she would like tyo stay on the current 12.5  dose of her mounjaro.

## 2024-11-19 ENCOUNTER — OFFICE VISIT (OUTPATIENT)
Dept: SURGERY | Age: 34
End: 2024-11-19
Payer: MEDICARE

## 2024-11-19 VITALS
RESPIRATION RATE: 16 BRPM | HEIGHT: 66 IN | DIASTOLIC BLOOD PRESSURE: 74 MMHG | TEMPERATURE: 97.8 F | BODY MASS INDEX: 37.28 KG/M2 | SYSTOLIC BLOOD PRESSURE: 120 MMHG | HEART RATE: 88 BPM | WEIGHT: 232 LBS | OXYGEN SATURATION: 98 %

## 2024-11-19 DIAGNOSIS — Z87.19 S/P REPAIR OF VENTRAL HERNIA: Primary | ICD-10-CM

## 2024-11-19 DIAGNOSIS — Z98.890 S/P REPAIR OF VENTRAL HERNIA: Primary | ICD-10-CM

## 2024-11-19 PROCEDURE — 3078F DIAST BP <80 MM HG: CPT | Performed by: SURGERY

## 2024-11-19 PROCEDURE — 99212 OFFICE O/P EST SF 10 MIN: CPT | Performed by: SURGERY

## 2024-11-19 PROCEDURE — 3074F SYST BP LT 130 MM HG: CPT | Performed by: SURGERY

## 2024-11-19 NOTE — PROGRESS NOTES
General Surgery Office Note  [facility]  Consandricardo Roldan MD    Patient's Name/Date of Birth: Gertrude Morales / 1990    Date: November 19, 2024     Chief compaint: Postop visit from laparoscopic/robotic inguinal hernia repair    Surgeon: MD Jamar    Patient Active Problem List   Diagnosis    Syncope    Hyperlipidemia    Factor V deficiency (HCC)    Mild intermittent asthma without complication    Hashimoto's thyroiditis    Lumbar radiculopathy    Morbid obesity    Chronic migraine with aura    Type 2 diabetes mellitus with vascular disease (HCC)    Umbilical hernia with obstruction, without gangrene    H/O: CVA (cerebrovascular accident)    Primary hypertension    Incisional hernia       Subjective: Doing well, no new complaints, pain prior to surgery has resolved, tolerating diet, bowel function normal, anxious to return to normal physical activity    Objective:  /74 (Site: Left Upper Arm, Position: Sitting, Cuff Size: Medium Adult)   Pulse 88   Temp 97.8 °F (36.6 °C)   Resp 16   Ht 1.676 m (5' 6\")   Wt 105.2 kg (232 lb)   LMP 10/22/2024 (Exact Date)   SpO2 98%   BMI 37.45 kg/m²   Labs:  No results for input(s): \"WBC\", \"HGB\", \"HCT\" in the last 72 hours.    Invalid input(s): \"PLR\"  Lab Results   Component Value Date    CREATININE 0.8 10/22/2024    BUN 12 10/22/2024     10/22/2024    K 3.7 10/22/2024     10/22/2024    CO2 26 10/22/2024     No results for input(s): \"LIPASE\", \"AMYLASE\" in the last 72 hours.    General appearance: AA, NAD  HEENT: NCAT, PERRLA, EOMI  Lungs: Clear, equal rise bilateral  Heart: Reg  Abdomen: soft, nondistended, nontender, incisions well healed, no signs of infection, no cellulitis, no hematoma      Assessment/Plan:  Gertrude Morales is a 33 y.o. female 2 weeks s/p laparoscopic/robotic ventral hernia repair. Doing well.    Resume activity gradually   No heavy lifting more than 20lbs for 6 weeks total  Follow up as needed    Physician

## 2024-11-22 ENCOUNTER — OFFICE VISIT (OUTPATIENT)
Dept: FAMILY MEDICINE CLINIC | Age: 34
End: 2024-11-22

## 2024-11-22 VITALS
WEIGHT: 230.2 LBS | SYSTOLIC BLOOD PRESSURE: 116 MMHG | RESPIRATION RATE: 18 BRPM | HEIGHT: 66 IN | HEART RATE: 79 BPM | OXYGEN SATURATION: 98 % | TEMPERATURE: 97.3 F | BODY MASS INDEX: 37 KG/M2 | DIASTOLIC BLOOD PRESSURE: 72 MMHG

## 2024-11-22 DIAGNOSIS — E11.59 TYPE 2 DIABETES MELLITUS WITH VASCULAR DISEASE (HCC): ICD-10-CM

## 2024-11-22 DIAGNOSIS — E78.5 DYSLIPIDEMIA: ICD-10-CM

## 2024-11-22 DIAGNOSIS — I63.50 CEREBRAL ARTERY OCCLUSION WITH CEREBRAL INFARCTION (HCC): Chronic | ICD-10-CM

## 2024-11-22 DIAGNOSIS — Z00.00 ENCOUNTER FOR MEDICARE ANNUAL WELLNESS EXAM: Primary | ICD-10-CM

## 2024-11-22 DIAGNOSIS — R53.83 OTHER FATIGUE: ICD-10-CM

## 2024-11-22 DIAGNOSIS — R79.89 LOW VITAMIN D LEVEL: ICD-10-CM

## 2024-11-22 DIAGNOSIS — G90.A POTS (POSTURAL ORTHOSTATIC TACHYCARDIA SYNDROME): ICD-10-CM

## 2024-11-22 DIAGNOSIS — D68.2 FACTOR V DEFICIENCY (HCC): ICD-10-CM

## 2024-11-22 DIAGNOSIS — R73.03 PREDIABETES: ICD-10-CM

## 2024-11-22 DIAGNOSIS — Z00.00 MEDICARE ANNUAL WELLNESS VISIT, SUBSEQUENT: ICD-10-CM

## 2024-11-22 LAB
CREATININE URINE: 107.3 MG/DL (ref 29–226)
MICROALBUMIN/CREAT 24H UR: <12 MG/L (ref 0–19)
MICROALBUMIN/CREAT UR-RTO: NORMAL MCG/MG CREAT (ref 0–30)

## 2024-11-22 RX ORDER — FOLIC ACID 1 MG/1
TABLET ORAL
Qty: 90 TABLET | Refills: 1 | Status: SHIPPED | OUTPATIENT
Start: 2024-11-22

## 2024-11-22 RX ORDER — METOPROLOL SUCCINATE 25 MG/1
12.5 TABLET, EXTENDED RELEASE ORAL NIGHTLY
Qty: 90 TABLET | Refills: 1 | Status: SHIPPED | OUTPATIENT
Start: 2024-11-22

## 2024-11-22 RX ORDER — MULTIVIT-MIN/IRON/FOLIC ACID/K 18-600-40
2000 CAPSULE ORAL DAILY
Qty: 90 CAPSULE | Refills: 1 | Status: SHIPPED | OUTPATIENT
Start: 2024-11-22

## 2024-11-22 RX ORDER — TIRZEPATIDE 12.5 MG/.5ML
0.5 INJECTION, SOLUTION SUBCUTANEOUS WEEKLY
Qty: 6 ML | Refills: 1 | Status: SHIPPED | OUTPATIENT
Start: 2024-11-22

## 2024-11-22 RX ORDER — ALBUTEROL SULFATE 90 UG/1
2 INHALANT RESPIRATORY (INHALATION) EVERY 6 HOURS PRN
Qty: 1 EACH | Refills: 5 | Status: SHIPPED | OUTPATIENT
Start: 2024-11-22

## 2024-11-22 RX ORDER — PANTOPRAZOLE SODIUM 40 MG/1
40 TABLET, DELAYED RELEASE ORAL DAILY
Qty: 90 TABLET | Refills: 1 | Status: SHIPPED | OUTPATIENT
Start: 2024-11-22

## 2024-11-22 SDOH — ECONOMIC STABILITY: FOOD INSECURITY: WITHIN THE PAST 12 MONTHS, THE FOOD YOU BOUGHT JUST DIDN'T LAST AND YOU DIDN'T HAVE MONEY TO GET MORE.: NEVER TRUE

## 2024-11-22 SDOH — ECONOMIC STABILITY: INCOME INSECURITY: HOW HARD IS IT FOR YOU TO PAY FOR THE VERY BASICS LIKE FOOD, HOUSING, MEDICAL CARE, AND HEATING?: SOMEWHAT HARD

## 2024-11-22 SDOH — ECONOMIC STABILITY: FOOD INSECURITY: WITHIN THE PAST 12 MONTHS, YOU WORRIED THAT YOUR FOOD WOULD RUN OUT BEFORE YOU GOT MONEY TO BUY MORE.: NEVER TRUE

## 2024-11-22 ASSESSMENT — PATIENT HEALTH QUESTIONNAIRE - PHQ9
1. LITTLE INTEREST OR PLEASURE IN DOING THINGS: MORE THAN HALF THE DAYS
SUM OF ALL RESPONSES TO PHQ9 QUESTIONS 1 & 2: 2
2. FEELING DOWN, DEPRESSED OR HOPELESS: NOT AT ALL
SUM OF ALL RESPONSES TO PHQ QUESTIONS 1-9: 2

## 2024-11-22 ASSESSMENT — LIFESTYLE VARIABLES
HOW OFTEN DO YOU HAVE A DRINK CONTAINING ALCOHOL: NEVER
HOW MANY STANDARD DRINKS CONTAINING ALCOHOL DO YOU HAVE ON A TYPICAL DAY: PATIENT DOES NOT DRINK

## 2024-11-22 NOTE — PROGRESS NOTES
Medicare Annual Wellness Visit    Gertrude Morales is here for Medicare AWV and Medication Refill    Assessment & Plan   Encounter for Medicare annual wellness exam    ---VASCULAR PANEL  A) asa, plavix, aggrenox  B) ELIQUIS, pletal, tzd, statin  C) ace, hctz, FOLIC, ccb  D) cannikinumab, fish oils     ---CARDIAC---ELIQUIS, ace, BETA, statin, hctz, ( ccb )     A) ace or arb  B) lipitor ( 40-80 ) or crestor ( 20 to 40 )  C) GLP  1  or sglt 2     Cerebral artery occlusion with cerebral infarction (HCC)  -     folic acid (FOLVITE) 1 MG tablet; One a day, Disp-90 tablet, R-1Normal  -     Comprehensive Metabolic Panel; Future  -     CBC with Auto Differential; Future  --stable on current care planning  -- continue treatment as we are meeting goals     POTS (postural orthostatic tachycardia syndrome)  -     metoprolol succinate (TOPROL XL) 25 MG extended release tablet; Take 0.5 tablets by mouth nightly, Disp-90 tablet, R-1Normal  -     Comprehensive Metabolic Panel; Future  -     CBC with Auto Differential; Future  Low vitamin D level  -     vitamin D (VITAMIN D, CHOLECALCIFEROL,) 50 MCG (2000 UT) CAPS capsule; Take 1 capsule by mouth daily, Disp-90 capsule, R-1Normal  -     Comprehensive Metabolic Panel; Future  -     CBC with Auto Differential; Future  Prediabetes  -     Comprehensive Metabolic Panel; Future  -     CBC with Auto Differential; Future  -     Hemoglobin A1C; Future  Dyslipidemia  -     Comprehensive Metabolic Panel; Future  -     Lipid Panel; Future  -     CBC with Auto Differential; Future  Other fatigue  -     TSH; Future  -     Uric Acid; Future  -     Comprehensive Metabolic Panel; Future  -     CBC with Auto Differential; Future  Factor V deficiency (HCC)  --stable on current care planning  -- continue treatment as we are meeting goals     Type 2 diabetes mellitus with vascular disease (HCC)  --stable on current care planning  -- continue treatment as we are meeting goals       Recommendations for

## 2024-11-29 NOTE — OP NOTE
Problem: Adult Behavioral Health Plan of Care  Goal: Plan of Care Review  Outcome: Progressing  Goal: Patient-Specific Goal (Individualization)  Outcome: Progressing  Goal: Adheres to Safety Considerations for Self and Others  Outcome: Progressing  Goal: Develops/Participates in Therapeutic [place] to Support Successful Transition  Outcome: Progressing     Problem: Excessive Substance Use  Goal: Optimized Energy Level (Excessive Substance Use)  Outcome: Progressing  Goal: Improved Behavioral Control (Excessive Substance Use)  Outcome: Progressing  Goal: Increased Participation and Engagement (Excessive Substance Use)  Outcome: Progressing  Goal: Improved Physiologic Symptoms (Excessive Substance Use)  Outcome: Progressing  Goal: Enhanced Social, Occupational or Functional Skills (Excessive Substance Use)  Outcome: Progressing     Problem: Suicide Risk  Goal: Absence of Self-Harm  Outcome: Progressing     Problem: Psychotic Signs/Symptoms  Goal: Improved Behavioral Control (Psychotic Signs/Symptoms)  Outcome: Progressing  Goal: Optimal Cognitive Function (Psychotic Signs/Symptoms)  Outcome: Progressing  Goal: Increased Participation and Engagement (Psychotic Signs/Symptoms)  Outcome: Progressing  Goal: Improved Mood Symptoms (Psychotic Signs/Symptoms)  Outcome: Progressing  Goal: Improved Psychomotor Symptoms (Psychotic Signs/Symptoms)  Outcome: Progressing  Goal: Decreased Sensory Symptoms (Psychotic Signs/Symptoms)  Outcome: Progressing  Goal: Improved Sleep (Psychotic Signs/Symptoms)  Outcome: Progressing  Goal: Enhanced Social, Occupational or Functional Skills (Psychotic Signs/Symptoms)  Outcome: Progressing      continuous locked suture of 0 monocryl. Tubes and ovaries were inspected and appeared to be normal. The gutters were cleared of any blood clots and debris. The operative field appeared to be hemostatic. The Mobius retractor was removed. Correct needle, sponge and instrument count was reported and the abdomen was then closed in layers using #1 Stratafix for the fascia and the subcuticular stapler for the skin. Steristrips were applied followed by a sterile dressing and the patient was then transferred to the recovery room in good stable condition.     Carl Barney MD

## 2024-12-18 ENCOUNTER — OFFICE VISIT (OUTPATIENT)
Dept: NEUROLOGY | Age: 34
End: 2024-12-18

## 2024-12-18 VITALS
OXYGEN SATURATION: 98 % | HEART RATE: 68 BPM | SYSTOLIC BLOOD PRESSURE: 101 MMHG | TEMPERATURE: 98.1 F | DIASTOLIC BLOOD PRESSURE: 68 MMHG

## 2024-12-18 DIAGNOSIS — G43.719 CHRONIC MIGRAINE WITHOUT AURA, INTRACTABLE, WITHOUT STATUS MIGRAINOSUS: Primary | ICD-10-CM

## 2024-12-18 DIAGNOSIS — G43.E19 INTRACTABLE CHRONIC MIGRAINE WITH AURA AND WITHOUT STATUS MIGRAINOSUS: ICD-10-CM

## 2024-12-18 RX ORDER — DEXAMETHASONE 4 MG/1
TABLET ORAL
Qty: 6 TABLET | Refills: 0 | Status: SHIPPED | OUTPATIENT
Start: 2024-12-18 | End: 2024-12-21

## 2024-12-18 NOTE — PROGRESS NOTES
Botulinum Toxin Injection Procedure Note    Pre-procedure Diagnosis: Chronic migraine  Auth good until 12/20/24    Indications: same    Procedure Details   The risks, benefits, indications, potential complications, and alternatives were explained to the patient and informed consent obtained.    The following 31 injection sites were injected with botulinum toxin:    Bilateral Corrugators: 5 units each side  Procerus: 5 units  Bilateral frontalis: 10 units each side  Bilateral temporalis: 20 units each side  Bilateral occipitalis: 15 units each side  Bilateral cervical paraspinals: 10 units each side  Bilateral trapezius: 15 units each side    Total: 155 units    Botulinum toxin Lot #:  [id number]  Botulinum toxin expiration date: 3/1/2027  Total botox units injected: 155 units  Total botox units wasted: 45 units    The patient tolerated the procedure well.    Plan:    3 day steroid burst sent to abort current headache cycle    Call with any facial muscle weakness, ptosis, neck weakness, difficulty swallowing, difficulty speaking, or diffculty breathing    Keep a careful HA diary    Return to clinic for repeat botulinum injection in 3 months.    Jose Juan Terry APRN - CNP-AQH  9:08 AM

## 2025-04-07 ENCOUNTER — TELEPHONE (OUTPATIENT)
Dept: PHARMACY | Facility: CLINIC | Age: 35
End: 2025-04-07

## 2025-04-07 ENCOUNTER — TELEPHONE (OUTPATIENT)
Dept: ADMINISTRATIVE | Age: 35
End: 2025-04-07

## 2025-04-07 NOTE — TELEPHONE ENCOUNTER
[facility] // Department, toll free [phone], Option 3    For Pharmacy Admin Tracking Only    Program: [employer]  CPA in place:  No  Recommendation Provided To: Pharmacy: 1  Intervention Detail: Adherence Monitorin  Intervention Accepted By: Pharmacy: 1  Gap Closed?: No   Time Spent (min): 15

## 2025-04-30 ENCOUNTER — OFFICE VISIT (OUTPATIENT)
Dept: NEUROLOGY | Age: 35
End: 2025-04-30
Payer: MEDICARE

## 2025-04-30 DIAGNOSIS — G43.719 CHRONIC MIGRAINE WITHOUT AURA, INTRACTABLE, WITHOUT STATUS MIGRAINOSUS: ICD-10-CM

## 2025-04-30 DIAGNOSIS — G43.E19 INTRACTABLE CHRONIC MIGRAINE WITH AURA AND WITHOUT STATUS MIGRAINOSUS: Primary | ICD-10-CM

## 2025-04-30 PROCEDURE — 64615 CHEMODENERV MUSC MIGRAINE: CPT | Performed by: NURSE PRACTITIONER

## 2025-04-30 NOTE — PROGRESS NOTES
Botulinum Toxin Injection Procedure Note    Pre-procedure Diagnosis: Chronic migraine    Indications: same    Procedure Details   The risks, benefits, indications, potential complications, and alternatives were explained to the patient and informed consent obtained.    The following 31 injection sites were injected with botulinum toxin:    Bilateral Corrugators: 5 units each side  Procerus: 5 units  Bilateral frontalis: 10 units each side  Bilateral temporalis: 20 units each side  Bilateral occipitalis: 15 units each side  Bilateral cervical paraspinals: 10 units each side  Bilateral trapezius: 15 units each side    Total: 155 units    Botulinum toxin Lot #:  [id number]  Botulinum toxin expiration date: 4/2027  Total botox units injected: 155 units  Total botox units wasted: 45 units    The patient tolerated the procedure well.    Plan:    Call with any facial muscle weakness, ptosis, neck weakness, difficulty swallowing, difficulty speaking, or diffculty breathing    Keep a careful HA diary    Return to clinic for repeat botulinum injection in 3 months.    Jose Juan Terry APRN - CNP-AQH  8:12 AM

## 2025-05-14 ENCOUNTER — HOSPITAL ENCOUNTER (OUTPATIENT)
Age: 35
Discharge: HOME | End: 2025-05-14
Payer: MEDICARE

## 2025-05-14 DIAGNOSIS — R79.89 LOW VITAMIN D LEVEL: ICD-10-CM

## 2025-05-14 DIAGNOSIS — R73.03 PREDIABETES: ICD-10-CM

## 2025-05-14 DIAGNOSIS — R53.83 OTHER FATIGUE: ICD-10-CM

## 2025-05-14 DIAGNOSIS — G90.A POTS (POSTURAL ORTHOSTATIC TACHYCARDIA SYNDROME): ICD-10-CM

## 2025-05-14 DIAGNOSIS — I63.50 CEREBRAL ARTERY OCCLUSION WITH CEREBRAL INFARCTION (HCC): Chronic | ICD-10-CM

## 2025-05-14 DIAGNOSIS — E78.5 DYSLIPIDEMIA: ICD-10-CM

## 2025-05-14 LAB
ALBUMIN SERPL-MCNC: 4.2 G/DL (ref 3.5–5.2)
ALP SERPL-CCNC: 59 U/L (ref 35–104)
ALT SERPL-CCNC: 21 U/L (ref 0–32)
ANION GAP SERPL CALCULATED.3IONS-SCNC: 12 MMOL/L (ref 7–16)
AST SERPL-CCNC: 23 U/L (ref 0–31)
BASOPHILS # BLD: 0.07 K/UL (ref 0–0.2)
BASOPHILS NFR BLD: 1 % (ref 0–2)
BILIRUB SERPL-MCNC: 0.6 MG/DL (ref 0–1.2)
BUN SERPL-MCNC: 13 MG/DL (ref 6–20)
CALCIUM SERPL-MCNC: 9.7 MG/DL (ref 8.6–10.2)
CHLORIDE SERPL-SCNC: 103 MMOL/L (ref 98–107)
CHOLEST SERPL-MCNC: 216 MG/DL
CO2 SERPL-SCNC: 22 MMOL/L (ref 22–29)
CREAT SERPL-MCNC: 0.9 MG/DL (ref 0.5–1)
EOSINOPHIL # BLD: 0.38 K/UL (ref 0.05–0.5)
EOSINOPHILS RELATIVE PERCENT: 7 % (ref 0–6)
ERYTHROCYTE [DISTWIDTH] IN BLOOD BY AUTOMATED COUNT: 12.3 % (ref 11.5–15)
GFR, ESTIMATED: >90 ML/MIN/1.73M2
GLUCOSE SERPL-MCNC: 86 MG/DL (ref 74–99)
HBA1C MFR BLD: 5.1 % (ref 4–5.6)
HCT VFR BLD AUTO: 40 % (ref 34–48)
HDLC SERPL-MCNC: 55 MG/DL
HGB BLD-MCNC: 13.5 G/DL (ref 11.5–15.5)
IMM GRANULOCYTES # BLD AUTO: <0.03 K/UL (ref 0–0.58)
IMM GRANULOCYTES NFR BLD: 0 % (ref 0–5)
LDLC SERPL CALC-MCNC: 139 MG/DL
LYMPHOCYTES NFR BLD: 1.52 K/UL (ref 1.5–4)
LYMPHOCYTES RELATIVE PERCENT: 26 % (ref 20–42)
MCH RBC QN AUTO: 30.8 PG (ref 26–35)
MCHC RBC AUTO-ENTMCNC: 33.8 G/DL (ref 32–34.5)
MCV RBC AUTO: 91.3 FL (ref 80–99.9)
MONOCYTES NFR BLD: 0.48 K/UL (ref 0.1–0.95)
MONOCYTES NFR BLD: 8 % (ref 2–12)
NEUTROPHILS NFR BLD: 57 % (ref 43–80)
NEUTS SEG NFR BLD: 3.3 K/UL (ref 1.8–7.3)
PLATELET # BLD AUTO: 310 K/UL (ref 130–450)
PMV BLD AUTO: 9.6 FL (ref 7–12)
POTASSIUM SERPL-SCNC: 4.2 MMOL/L (ref 3.5–5)
PROT SERPL-MCNC: 7.6 G/DL (ref 6.4–8.3)
RBC # BLD AUTO: 4.38 M/UL (ref 3.5–5.5)
SODIUM SERPL-SCNC: 137 MMOL/L (ref 132–146)
TRIGL SERPL-MCNC: 109 MG/DL
TSH SERPL DL<=0.05 MIU/L-ACNC: 1.73 UIU/ML (ref 0.27–4.2)
URATE SERPL-MCNC: 4.8 MG/DL (ref 2.4–5.7)
VLDLC SERPL CALC-MCNC: 22 MG/DL
WBC OTHER # BLD: 5.8 K/UL (ref 4.5–11.5)

## 2025-05-14 PROCEDURE — 84443 ASSAY THYROID STIM HORMONE: CPT

## 2025-05-14 PROCEDURE — 80053 COMPREHEN METABOLIC PANEL: CPT

## 2025-05-14 PROCEDURE — 85025 COMPLETE CBC W/AUTO DIFF WBC: CPT

## 2025-05-14 PROCEDURE — 84550 ASSAY OF BLOOD/URIC ACID: CPT

## 2025-05-14 PROCEDURE — 83036 HEMOGLOBIN GLYCOSYLATED A1C: CPT

## 2025-05-14 PROCEDURE — 36415 COLL VENOUS BLD VENIPUNCTURE: CPT

## 2025-05-14 PROCEDURE — 80061 LIPID PANEL: CPT

## 2025-05-20 SDOH — ECONOMIC STABILITY: FOOD INSECURITY: WITHIN THE PAST 12 MONTHS, THE FOOD YOU BOUGHT JUST DIDN'T LAST AND YOU DIDN'T HAVE MONEY TO GET MORE.: NEVER TRUE

## 2025-05-20 SDOH — ECONOMIC STABILITY: INCOME INSECURITY: IN THE LAST 12 MONTHS, WAS THERE A TIME WHEN YOU WERE NOT ABLE TO PAY THE MORTGAGE OR RENT ON TIME?: NO

## 2025-05-20 SDOH — HEALTH STABILITY: PHYSICAL HEALTH: ON AVERAGE, HOW MANY MINUTES DO YOU ENGAGE IN EXERCISE AT THIS LEVEL?: 20 MIN

## 2025-05-20 SDOH — ECONOMIC STABILITY: FOOD INSECURITY: WITHIN THE PAST 12 MONTHS, YOU WORRIED THAT YOUR FOOD WOULD RUN OUT BEFORE YOU GOT MONEY TO BUY MORE.: NEVER TRUE

## 2025-05-20 SDOH — HEALTH STABILITY: PHYSICAL HEALTH: ON AVERAGE, HOW MANY DAYS PER WEEK DO YOU ENGAGE IN MODERATE TO STRENUOUS EXERCISE (LIKE A BRISK WALK)?: 2 DAYS

## 2025-05-20 ASSESSMENT — LIFESTYLE VARIABLES
HOW OFTEN DO YOU HAVE A DRINK CONTAINING ALCOHOL: 1
HOW OFTEN DO YOU HAVE A DRINK CONTAINING ALCOHOL: NEVER
HOW OFTEN DO YOU HAVE SIX OR MORE DRINKS ON ONE OCCASION: 1
HOW MANY STANDARD DRINKS CONTAINING ALCOHOL DO YOU HAVE ON A TYPICAL DAY: 0
HOW MANY STANDARD DRINKS CONTAINING ALCOHOL DO YOU HAVE ON A TYPICAL DAY: PATIENT DOES NOT DRINK

## 2025-05-20 ASSESSMENT — PATIENT HEALTH QUESTIONNAIRE - PHQ9
SUM OF ALL RESPONSES TO PHQ QUESTIONS 1-9: 0
2. FEELING DOWN, DEPRESSED OR HOPELESS: NOT AT ALL
1. LITTLE INTEREST OR PLEASURE IN DOING THINGS: NOT AT ALL
SUM OF ALL RESPONSES TO PHQ QUESTIONS 1-9: 0

## 2025-05-21 ENCOUNTER — OFFICE VISIT (OUTPATIENT)
Dept: FAMILY MEDICINE CLINIC | Age: 35
End: 2025-05-21
Payer: MEDICARE

## 2025-05-21 VITALS
WEIGHT: 214.2 LBS | OXYGEN SATURATION: 98 % | HEART RATE: 81 BPM | BODY MASS INDEX: 34.42 KG/M2 | SYSTOLIC BLOOD PRESSURE: 104 MMHG | HEIGHT: 66 IN | RESPIRATION RATE: 18 BRPM | TEMPERATURE: 98.7 F | DIASTOLIC BLOOD PRESSURE: 64 MMHG

## 2025-05-21 DIAGNOSIS — Z00.00 MEDICARE ANNUAL WELLNESS VISIT, SUBSEQUENT: ICD-10-CM

## 2025-05-21 DIAGNOSIS — G90.A POTS (POSTURAL ORTHOSTATIC TACHYCARDIA SYNDROME): ICD-10-CM

## 2025-05-21 DIAGNOSIS — I10 PRIMARY HYPERTENSION: ICD-10-CM

## 2025-05-21 DIAGNOSIS — E11.59 TYPE 2 DIABETES MELLITUS WITH VASCULAR DISEASE (HCC): ICD-10-CM

## 2025-05-21 DIAGNOSIS — I63.50 CEREBRAL ARTERY OCCLUSION WITH CEREBRAL INFARCTION (HCC): Chronic | ICD-10-CM

## 2025-05-21 DIAGNOSIS — E78.5 DYSLIPIDEMIA: ICD-10-CM

## 2025-05-21 DIAGNOSIS — Z00.00 MEDICARE ANNUAL WELLNESS VISIT, INITIAL: Primary | ICD-10-CM

## 2025-05-21 DIAGNOSIS — R53.83 OTHER FATIGUE: ICD-10-CM

## 2025-05-21 DIAGNOSIS — R79.89 LOW VITAMIN D LEVEL: ICD-10-CM

## 2025-05-21 DIAGNOSIS — D68.2 FACTOR V DEFICIENCY (HCC): ICD-10-CM

## 2025-05-21 DIAGNOSIS — E66.01 MORBID OBESITY (HCC): ICD-10-CM

## 2025-05-21 PROBLEM — K43.2 INCISIONAL HERNIA: Status: RESOLVED | Noted: 2024-10-08 | Resolved: 2025-05-21

## 2025-05-21 PROCEDURE — 3078F DIAST BP <80 MM HG: CPT | Performed by: FAMILY MEDICINE

## 2025-05-21 PROCEDURE — 3074F SYST BP LT 130 MM HG: CPT | Performed by: FAMILY MEDICINE

## 2025-05-21 PROCEDURE — G0439 PPPS, SUBSEQ VISIT: HCPCS | Performed by: FAMILY MEDICINE

## 2025-05-21 PROCEDURE — 3044F HG A1C LEVEL LT 7.0%: CPT | Performed by: FAMILY MEDICINE

## 2025-05-21 RX ORDER — FOLIC ACID 1 MG/1
TABLET ORAL
Qty: 90 TABLET | Refills: 1 | Status: SHIPPED | OUTPATIENT
Start: 2025-05-21

## 2025-05-21 RX ORDER — AZELASTINE 1 MG/ML
1 SPRAY, METERED NASAL 2 TIMES DAILY
Qty: 60 ML | Refills: 1 | Status: SHIPPED | OUTPATIENT
Start: 2025-05-21

## 2025-05-21 RX ORDER — ALBUTEROL SULFATE 90 UG/1
2 INHALANT RESPIRATORY (INHALATION) EVERY 6 HOURS PRN
Qty: 18 G | Refills: 3 | Status: SHIPPED | OUTPATIENT
Start: 2025-05-21

## 2025-05-21 RX ORDER — TRAZODONE HYDROCHLORIDE 100 MG/1
100 TABLET ORAL NIGHTLY
Qty: 45 TABLET | Refills: 0
Start: 2025-05-21

## 2025-05-21 RX ORDER — MULTIVIT-MIN/IRON/FOLIC ACID/K 18-600-40
2000 CAPSULE ORAL DAILY
Qty: 90 CAPSULE | Refills: 1 | Status: SHIPPED | OUTPATIENT
Start: 2025-05-21

## 2025-05-21 RX ORDER — PANTOPRAZOLE SODIUM 40 MG/1
40 TABLET, DELAYED RELEASE ORAL DAILY
Qty: 90 TABLET | Refills: 1 | Status: SHIPPED | OUTPATIENT
Start: 2025-05-21

## 2025-05-21 RX ORDER — CITALOPRAM HYDROBROMIDE 20 MG/1
40 TABLET ORAL DAILY
Qty: 46 TABLET | Refills: 1
Start: 2025-05-21

## 2025-05-21 RX ORDER — METOPROLOL SUCCINATE 25 MG/1
12.5 TABLET, EXTENDED RELEASE ORAL NIGHTLY
Qty: 90 TABLET | Refills: 1 | Status: SHIPPED | OUTPATIENT
Start: 2025-05-21

## 2025-05-21 NOTE — TELEPHONE ENCOUNTER
She did not have a copay with the 2.5 mg Eliquis so the pharmacy thinks she may have met her deductible. She would like to try and fill the 5 mg to see. If its still expensive, she will get the 2.5 mg

## 2025-05-21 NOTE — PROGRESS NOTES
Medicare Annual Wellness Visit    Gertrude Morales is here for Medicare AWV (Pt here for her AWV ), Diabetes (A1c done 5/14/Micro done 11/22/2024), Discuss Labs (Pt had labs done 5/14/2025/), Health Maintenance (Due for a diabetic foot and eye exam ), Discuss Medications (Pt can't afford the Eliquis. Pt wants to increase her Mounjaro. Needs a new script for Albuterol ), and Allergies (Pt can't breath. Pt not sure if she needs to see a specialist or if you can help her. She had tried all the OTC and is using her Inhalers inappropriately )    Assessment & Plan   Medicare annual wellness visit, initial    ---VASCULAR PANEL  A) asa, plavix, aggrenox  B) ELIQUIS, pletal, tzd, statin  C) ace, hctz, FOLIC, ccb  D) cannikinumab, fish oils     ---CARDIAC---ELIQUIS, ace, BETA, statin, hctz, ( ccb )     A) ace or arb  B) lipitor ( 40-80 ) or crestor ( 20 to 40 )  C) GLP- 1  or sglt 2     Cerebral artery occlusion with cerebral infarction (HCC)  -     folic acid (FOLVITE) 1 MG tablet; One a day, Disp-90 tablet, R-1Normal  --stable on current care planning  -- continue treatment as we are meeting goals     POTS (postural orthostatic tachycardia syndrome)  -     metoprolol succinate (TOPROL XL) 25 MG extended release tablet; Take 0.5 tablets by mouth nightly, Disp-90 tablet, R-1Normal  Low vitamin D level  -     vitamin D (VITAMIN D, CHOLECALCIFEROL,) 50 MCG (2000 UT) CAPS capsule; Take 1 capsule by mouth daily, Disp-90 capsule, R-1Normal  Factor V deficiency (HCC)  --stable on current care planning  -- continue treatment as we are meeting goals     Type 2 diabetes mellitus with vascular disease (HCC)  -     Tirzepatide (MOUNJARO) 15 MG/0.5ML SOAJ pen; Inject 15 mg into the skin once a week, Disp-6 mL, R-1Normal  --stable on current care planning  -- continue treatment as we are meeting goals     Morbid obesity (HCC)  Long talk on treatment and prevention  Literature is given   --talk diet, exercise and weight loss    Primary

## 2025-05-21 NOTE — PATIENT INSTRUCTIONS
more?  Go to [email or web address]/patientEd and enter F075 to learn more about \"A Healthy Heart: Care Instructions.\"  Current as of: July 31, 2024  Content Version: 14.4  © 4746-8389 [email or web address].   Care instructions adapted under license by [employer]. If you have questions about a medical condition or this instruction, always ask your healthcare professional. [employer], disclaims any warranty or liability for your use of this information.    Personalized Preventive Plan for Gertrude Morales - 5/21/2025  Medicare offers a range of preventive health benefits. Some of the tests and screenings are paid in full while other may be subject to a deductible, co-insurance, and/or copay.  Some of these benefits include a comprehensive review of your medical history including lifestyle, illnesses that may run in your family, and various assessments and screenings as appropriate.  After reviewing your medical record and screening and assessments performed today your provider may have ordered immunizations, labs, imaging, and/or referrals for you.  A list of these orders (if applicable) as well as your Preventive Care list are included within your After Visit Summary for your review.

## 2025-07-11 ENCOUNTER — TELEPHONE (OUTPATIENT)
Dept: FAMILY MEDICINE CLINIC | Age: 35
End: 2025-07-11

## 2025-07-11 RX ORDER — DILTIAZEM HYDROCHLORIDE 60 MG/1
2 TABLET, FILM COATED ORAL 2 TIMES DAILY
Qty: 10.2 G | Refills: 3 | Status: SHIPPED | OUTPATIENT
Start: 2025-07-11

## 2025-07-11 NOTE — TELEPHONE ENCOUNTER
Pt called and states she is having an asthma flare up and when she used her rescue inhaler it used to be immediate relief and she said she used her inhaler and it is not helping this time and wants to know if we can call her in something else, please advise.     Electronically signed by JANIS PERLA MA on 7/11/25 at 12:56 PM EDT

## (undated) DEVICE — GOWN,SIRUS,NONRNF,SETINSLV,XL,20/CS: Brand: MEDLINE

## (undated) DEVICE — SYRINGE MED 10ML TRNSLUC BRL PLUNG BLK MRK POLYPR CTRL

## (undated) DEVICE — BASIC DOUBLE BASIN 2-LF: Brand: MEDLINE INDUSTRIES, INC.

## (undated) DEVICE — PROGRASP FORCEPS: Brand: ENDOWRIST

## (undated) DEVICE — MASTISOL ADHESIVE LIQ 2/3ML

## (undated) DEVICE — INSUFFLATION NEEDLE TO ESTABLISH PNEUMOPERITONEUM.: Brand: INSUFFLATION NEEDLE

## (undated) DEVICE — GOWN,SIRUS,FABRNF,XL,20/CS: Brand: MEDLINE

## (undated) DEVICE — SUTURE VCRL SZ 1 L27IN ABSRB VLT L36MM CT-1 1/2 CIR J341H

## (undated) DEVICE — APPLICATOR MEDICATED 26 CC SOLUTION HI LT ORNG CHLORAPREP

## (undated) DEVICE — SHEET,DRAPE,40X58,STERILE: Brand: MEDLINE

## (undated) DEVICE — SCALPEL SURG NO10 S STL ABS PLAS HNDL DISPOSABLE

## (undated) DEVICE — STAPLER SKIN SQ 30 ABSRB STPL DISP INSORB

## (undated) DEVICE — ELECTRODE PT RET AD L9FT HI MOIST COND ADH HYDRGEL CORDED

## (undated) DEVICE — LINER,SOFT,SUCTION CANISTER,1500CC: Brand: MEDLINE

## (undated) DEVICE — GLOVE ORANGE PI 7 1/2   MSG9075

## (undated) DEVICE — 3M™ STERI-STRIP™ ELASTIC SKIN CLOSURES, E4547, 1/2 IN X 4 IN (12 MM X 100 MM), 6 STRIPS/ENVELOPE: Brand: 3M™ STERI-STRIP™

## (undated) DEVICE — PLUMEPORT LAPAROSCOPIC SMOKE FILTRATION DEVICE: Brand: PLUMEPORT ACTIV

## (undated) DEVICE — COVER,LIGHT HANDLE,FLX,1/PK: Brand: MEDLINE INDUSTRIES, INC.

## (undated) DEVICE — TROCAR: Brand: KII SLEEVE

## (undated) DEVICE — CONTROL SYRINGE LUER-LOCK TIP: Brand: MONOJECT

## (undated) DEVICE — APPLIER CLP M/L SHFT DIA5MM 15 LIG LIGAMAX 5

## (undated) DEVICE — STANDARD HYPODERMIC NEEDLE,POLYPROPYLENE HUB: Brand: MONOJECT

## (undated) DEVICE — SUTURE V-LOC 180 SZ 0 L9IN ABSRB GRN GS-21 L37MM 1/2 CIR VLOCL0346

## (undated) DEVICE — [HIGH FLOW INSUFFLATOR,  DO NOT USE IF PACKAGE IS DAMAGED,  KEEP DRY,  KEEP AWAY FROM SUNLIGHT,  PROTECT FROM HEAT AND RADIOACTIVE SOURCES.]: Brand: PNEUMOSURE

## (undated) DEVICE — WARMER SCP LAP

## (undated) DEVICE — NEEDLE HYPO 25GA L1.5IN BLU POLYPR HUB S STL REG BVL STR

## (undated) DEVICE — MARKER,SKIN,WI/RULER AND LABELS: Brand: MEDLINE

## (undated) DEVICE — 3 ML SYRINGE LUER-LOCK TIP: Brand: MONOJECT

## (undated) DEVICE — SCALPEL SURG NO21 S STL STR W/ INTEGR MTRC RUL DISP

## (undated) DEVICE — SLEEVE COMPRESS STD CALF KNEE SCD

## (undated) DEVICE — SCISSORS SURG DIA8MM MPLR CRV ENDOWRIST

## (undated) DEVICE — TUBE BLD COLLECT ST 1 SIL COAT 7ML 10ML

## (undated) DEVICE — Device

## (undated) DEVICE — PACK SURG LAP CHOLE CUSTOM

## (undated) DEVICE — 6 ML SYRINGE LUER-LOCK TIP: Brand: MONOJECT

## (undated) DEVICE — 4-PORT MANIFOLD: Brand: NEPTUNE 2

## (undated) DEVICE — SOLUTION IV 1000ML 0.9% SOD CHL PH 5 INJ USP VIAFLX PLAS

## (undated) DEVICE — GLOVE ORANGE PI 7   MSG9070

## (undated) DEVICE — GOWN,SIRUS,POLYRNF,BRTHSLV,XLN/XXL,18/CS: Brand: MEDLINE

## (undated) DEVICE — TOWEL,OR,DSP,ST,BLUE,STD,6/PK,12PK/CS: Brand: MEDLINE

## (undated) DEVICE — TROCAR: Brand: KII FIOS FIRST ENTRY

## (undated) DEVICE — ENCORE® LATEX TEXTURED SIZE 6.5, STERILE LATEX POWDER-FREE SURGICAL GLOVE: Brand: ENCORE

## (undated) DEVICE — GARMENT,MEDLINE,DVT,INT,CALF,MED, GEN2: Brand: MEDLINE

## (undated) DEVICE — BINDER ABD M/L H12IN FOR 46-62IN WHT 4 SLD PNL DSGN HOOP

## (undated) DEVICE — PAD ABD CURITY TENDERSORB 5X9IN

## (undated) DEVICE — 3M™ RED DOT™ MONITORING ELECTRODE WITH FOAM TAPE AND STICKY GEL 2560, 50/BAG, 20/CASE, 72/PLT: Brand: RED DOT™

## (undated) DEVICE — GLOVE SURG SZ 65 THK91MIL LTX FREE SYN POLYISOPRENE

## (undated) DEVICE — BANDAGE ADH DIA7/8IN PLAS SPOT COMFORTABLE CURAD

## (undated) DEVICE — SEAL

## (undated) DEVICE — GAUZE,SPONGE,4"X4",12PLY,STERILE,LF,2'S: Brand: MEDLINE

## (undated) DEVICE — SUTURE STRATAFIX SYMMETRIC SZ 1 L18IN ABSRB VLT CT1 L36CM SXPP1A404

## (undated) DEVICE — WIPES SKIN CLOTH READYPREP 9 X 10.5 IN 2% CHLORHEX GLUCONATE CHG PREOP

## (undated) DEVICE — DRESSING SUPERABSORBENT W4XL4IN 4 LAYR NONWOVEN FLD

## (undated) DEVICE — STAPLER INT DIA5MM 25 ABSRB STRP FIX DISP FOR HERN MESH

## (undated) DEVICE — RETRACTOR SURG M-L RNG DIA17CM INCIS 15CM ELAS SELF RET BLNT

## (undated) DEVICE — COVER,TABLE,44X90,STERILE: Brand: MEDLINE

## (undated) DEVICE — PMI PTFE COATED LAPAROSCOPIC WIRE L-HOOK 33 CM: Brand: PMI

## (undated) DEVICE — TOWEL OR BLUEE 16X26IN ST 8 PACK ORB08 16X26ORTWL

## (undated) DEVICE — DOUBLE BASIN SET: Brand: MEDLINE INDUSTRIES, INC.

## (undated) DEVICE — TELFA ADHESIVE ISLAND DRESSING: Brand: TELFA

## (undated) DEVICE — BLADE CLIPPER GEN PURP NS

## (undated) DEVICE — SUTURE MCRYL SZ 0 L27IN ABSRB VLT CT-1 L36MM 1/2 CIR TAPR Y340H

## (undated) DEVICE — Z DISCONTINUED APPLICATOR SURG PREP 0.35OZ 2% CHG 70% ISO ALC W/ HI LT

## (undated) DEVICE — LAPAROSCOPIC SCISSORS: Brand: EPIX LAPAROSCOPIC SCISSORS

## (undated) DEVICE — ARM DRAPE

## (undated) DEVICE — NEEDLE HYPO 18GA L1.5IN PNK POLYPR HUB S STL THN WALL FILL

## (undated) DEVICE — Device: Brand: PORTEX

## (undated) DEVICE — COLUMN DRAPE

## (undated) DEVICE — CHLORAPREP 26ML ORANGE

## (undated) DEVICE — ELECTRO LUBE IS A SINGLE PATIENT USE DEVICE THAT IS INTENDED TO BE USED ON ELECTROSURGICAL ELECTRODES TO REDUCE STICKING.: Brand: KEY SURGICAL ELECTRO LUBE

## (undated) DEVICE — PLUMEPORT ACTIV LAPAROSCOPIC SMOKE FILTRATION DEVICE: Brand: PLUMEPORT ACTIVE

## (undated) DEVICE — BANDAGE ADH W1XL3IN NAT FAB WVN FLX DURABLE N ADH PD SEAL

## (undated) DEVICE — TIP COVER ACCESSORY

## (undated) DEVICE — CESAREAN BIRTH PACK: Brand: MEDLINE INDUSTRIES, INC.

## (undated) DEVICE — TRAY CATH CATH OD16FR 200ML URIN M SIL CNTR ENTRY F

## (undated) DEVICE — NON-DEHP CATHETER EXTENSION SET, MALE LUER LOCK ADAPTER

## (undated) DEVICE — SHEET,DRAPE,70X100,STERILE: Brand: MEDLINE

## (undated) DEVICE — LIQUIBAND RAPID ADHESIVE 36/CS 0.8ML: Brand: MEDLINE